# Patient Record
Sex: MALE | Race: WHITE | Employment: OTHER | URBAN - METROPOLITAN AREA
[De-identification: names, ages, dates, MRNs, and addresses within clinical notes are randomized per-mention and may not be internally consistent; named-entity substitution may affect disease eponyms.]

---

## 2017-01-04 RX ORDER — INSULIN GLARGINE 100 [IU]/ML
60 INJECTION, SOLUTION SUBCUTANEOUS NIGHTLY
Qty: 60 ML | Refills: 2 | Status: SHIPPED | OUTPATIENT
Start: 2017-01-04 | End: 2017-07-11 | Stop reason: SDUPTHER

## 2017-01-19 ENCOUNTER — OFFICE VISIT (OUTPATIENT)
Dept: ENDOCRINOLOGY | Age: 67
End: 2017-01-19

## 2017-01-19 VITALS
WEIGHT: 225.4 LBS | DIASTOLIC BLOOD PRESSURE: 61 MMHG | HEART RATE: 80 BPM | OXYGEN SATURATION: 94 % | BODY MASS INDEX: 36.86 KG/M2 | SYSTOLIC BLOOD PRESSURE: 125 MMHG | TEMPERATURE: 99.2 F

## 2017-01-19 DIAGNOSIS — E78.5 HYPERLIPIDEMIA, UNSPECIFIED HYPERLIPIDEMIA TYPE: ICD-10-CM

## 2017-01-19 DIAGNOSIS — E11.9 DIABETES MELLITUS WITHOUT COMPLICATION (HCC): Primary | ICD-10-CM

## 2017-01-19 DIAGNOSIS — I10 BENIGN ESSENTIAL HTN: ICD-10-CM

## 2017-01-19 LAB — HBA1C MFR BLD: 7.9 %

## 2017-01-19 PROCEDURE — 4040F PNEUMOC VAC/ADMIN/RCVD: CPT | Performed by: INTERNAL MEDICINE

## 2017-01-19 PROCEDURE — G8484 FLU IMMUNIZE NO ADMIN: HCPCS | Performed by: INTERNAL MEDICINE

## 2017-01-19 PROCEDURE — G8427 DOCREV CUR MEDS BY ELIG CLIN: HCPCS | Performed by: INTERNAL MEDICINE

## 2017-01-19 PROCEDURE — 1036F TOBACCO NON-USER: CPT | Performed by: INTERNAL MEDICINE

## 2017-01-19 PROCEDURE — 99214 OFFICE O/P EST MOD 30 MIN: CPT | Performed by: INTERNAL MEDICINE

## 2017-01-19 PROCEDURE — G8419 CALC BMI OUT NRM PARAM NOF/U: HCPCS | Performed by: INTERNAL MEDICINE

## 2017-01-19 PROCEDURE — 3045F PR MOST RECENT HEMOGLOBIN A1C LEVEL 7.0-9.0%: CPT | Performed by: INTERNAL MEDICINE

## 2017-01-19 PROCEDURE — 3017F COLORECTAL CA SCREEN DOC REV: CPT | Performed by: INTERNAL MEDICINE

## 2017-01-19 PROCEDURE — G8599 NO ASA/ANTIPLAT THER USE RNG: HCPCS | Performed by: INTERNAL MEDICINE

## 2017-01-19 PROCEDURE — 1123F ACP DISCUSS/DSCN MKR DOCD: CPT | Performed by: INTERNAL MEDICINE

## 2017-01-19 PROCEDURE — 83036 HEMOGLOBIN GLYCOSYLATED A1C: CPT | Performed by: INTERNAL MEDICINE

## 2017-03-30 RX ORDER — OMEPRAZOLE 20 MG/1
CAPSULE, DELAYED RELEASE ORAL
Qty: 180 CAPSULE | Refills: 3 | Status: SHIPPED | OUTPATIENT
Start: 2017-03-30 | End: 2018-03-27 | Stop reason: SDUPTHER

## 2017-04-25 ENCOUNTER — OFFICE VISIT (OUTPATIENT)
Dept: DERMATOLOGY | Age: 67
End: 2017-04-25

## 2017-04-25 DIAGNOSIS — Z12.83 SCREENING EXAM FOR SKIN CANCER: ICD-10-CM

## 2017-04-25 DIAGNOSIS — L57.0 ACTINIC KERATOSIS: Primary | ICD-10-CM

## 2017-04-25 DIAGNOSIS — Z86.006 HISTORY OF MELANOMA IN SITU: ICD-10-CM

## 2017-04-25 PROCEDURE — 4040F PNEUMOC VAC/ADMIN/RCVD: CPT | Performed by: DERMATOLOGY

## 2017-04-25 PROCEDURE — 99214 OFFICE O/P EST MOD 30 MIN: CPT | Performed by: DERMATOLOGY

## 2017-04-25 PROCEDURE — 3017F COLORECTAL CA SCREEN DOC REV: CPT | Performed by: DERMATOLOGY

## 2017-04-25 PROCEDURE — 1123F ACP DISCUSS/DSCN MKR DOCD: CPT | Performed by: DERMATOLOGY

## 2017-04-25 PROCEDURE — 17000 DESTRUCT PREMALG LESION: CPT | Performed by: DERMATOLOGY

## 2017-04-25 PROCEDURE — G8427 DOCREV CUR MEDS BY ELIG CLIN: HCPCS | Performed by: DERMATOLOGY

## 2017-04-25 PROCEDURE — G8599 NO ASA/ANTIPLAT THER USE RNG: HCPCS | Performed by: DERMATOLOGY

## 2017-04-25 PROCEDURE — 1036F TOBACCO NON-USER: CPT | Performed by: DERMATOLOGY

## 2017-04-25 PROCEDURE — G8417 CALC BMI ABV UP PARAM F/U: HCPCS | Performed by: DERMATOLOGY

## 2017-04-28 ENCOUNTER — OFFICE VISIT (OUTPATIENT)
Dept: ENDOCRINOLOGY | Age: 67
End: 2017-04-28

## 2017-04-28 VITALS
HEIGHT: 65 IN | BODY MASS INDEX: 38.62 KG/M2 | OXYGEN SATURATION: 94 % | SYSTOLIC BLOOD PRESSURE: 123 MMHG | RESPIRATION RATE: 14 BRPM | HEART RATE: 75 BPM | WEIGHT: 231.8 LBS | DIASTOLIC BLOOD PRESSURE: 70 MMHG

## 2017-04-28 DIAGNOSIS — E78.5 HYPERLIPIDEMIA, UNSPECIFIED HYPERLIPIDEMIA TYPE: ICD-10-CM

## 2017-04-28 DIAGNOSIS — I10 BENIGN ESSENTIAL HTN: ICD-10-CM

## 2017-04-28 DIAGNOSIS — E11.9 DIABETES MELLITUS WITHOUT COMPLICATION (HCC): Primary | ICD-10-CM

## 2017-04-28 LAB — HBA1C MFR BLD: 8 %

## 2017-04-28 PROCEDURE — 1036F TOBACCO NON-USER: CPT | Performed by: INTERNAL MEDICINE

## 2017-04-28 PROCEDURE — 83036 HEMOGLOBIN GLYCOSYLATED A1C: CPT | Performed by: INTERNAL MEDICINE

## 2017-04-28 PROCEDURE — 4040F PNEUMOC VAC/ADMIN/RCVD: CPT | Performed by: INTERNAL MEDICINE

## 2017-04-28 PROCEDURE — 1123F ACP DISCUSS/DSCN MKR DOCD: CPT | Performed by: INTERNAL MEDICINE

## 2017-04-28 PROCEDURE — G8599 NO ASA/ANTIPLAT THER USE RNG: HCPCS | Performed by: INTERNAL MEDICINE

## 2017-04-28 PROCEDURE — 3017F COLORECTAL CA SCREEN DOC REV: CPT | Performed by: INTERNAL MEDICINE

## 2017-04-28 PROCEDURE — 99214 OFFICE O/P EST MOD 30 MIN: CPT | Performed by: INTERNAL MEDICINE

## 2017-04-28 PROCEDURE — G8417 CALC BMI ABV UP PARAM F/U: HCPCS | Performed by: INTERNAL MEDICINE

## 2017-04-28 PROCEDURE — G8427 DOCREV CUR MEDS BY ELIG CLIN: HCPCS | Performed by: INTERNAL MEDICINE

## 2017-04-28 PROCEDURE — 3045F PR MOST RECENT HEMOGLOBIN A1C LEVEL 7.0-9.0%: CPT | Performed by: INTERNAL MEDICINE

## 2017-05-16 ENCOUNTER — EMPLOYEE WELLNESS (OUTPATIENT)
Dept: OTHER | Age: 67
End: 2017-05-16

## 2017-05-16 LAB
CHOLESTEROL, TOTAL: 123 MG/DL (ref 0–199)
ESTIMATED AVERAGE GLUCOSE: 188.6 MG/DL
GLUCOSE BLD-MCNC: 304 MG/DL (ref 70–99)
HBA1C MFR BLD: 8.2 %
HDLC SERPL-MCNC: 44 MG/DL (ref 40–60)
LDL CHOLESTEROL CALCULATED: 57 MG/DL
TRIGL SERPL-MCNC: 110 MG/DL (ref 0–150)

## 2017-06-29 ENCOUNTER — HOSPITAL ENCOUNTER (OUTPATIENT)
Dept: DIABETES SERVICES | Age: 67
Discharge: HOME | End: 2017-06-30
Attending: INTERNAL MEDICINE | Admitting: INTERNAL MEDICINE

## 2017-06-29 DIAGNOSIS — E11.9 TYPE 2 DIABETES MELLITUS WITHOUT COMPLICATIONS (HCC): ICD-10-CM

## 2017-06-29 ASSESSMENT — PATIENT HEALTH QUESTIONNAIRE - PHQ9
4. FEELING TIRED OR HAVING LITTLE ENERGY: 1
3. TROUBLE FALLING OR STAYING ASLEEP: 1
6. FEELING BAD ABOUT YOURSELF - OR THAT YOU ARE A FAILURE OR HAVE LET YOURSELF OR YOUR FAMILY DOWN: 3
SUM OF ALL RESPONSES TO PHQ9 QUESTIONS 1 & 2: 3
8. MOVING OR SPEAKING SO SLOWLY THAT OTHER PEOPLE COULD HAVE NOTICED. OR THE OPPOSITE, BEING SO FIGETY OR RESTLESS THAT YOU HAVE BEEN MOVING AROUND A LOT MORE THAN USUAL: 0
7. TROUBLE CONCENTRATING ON THINGS, SUCH AS READING THE NEWSPAPER OR WATCHING TELEVISION: 1
SUM OF ALL RESPONSES TO PHQ QUESTIONS 1-9: 10
2. FEELING DOWN, DEPRESSED OR HOPELESS: 1
9. THOUGHTS THAT YOU WOULD BE BETTER OFF DEAD, OR OF HURTING YOURSELF: 0
5. POOR APPETITE OR OVEREATING: 1
1. LITTLE INTEREST OR PLEASURE IN DOING THINGS: 2

## 2017-07-06 ENCOUNTER — OFFICE VISIT (OUTPATIENT)
Dept: FAMILY MEDICINE CLINIC | Age: 67
End: 2017-07-06

## 2017-07-06 ENCOUNTER — TELEPHONE (OUTPATIENT)
Dept: FAMILY MEDICINE CLINIC | Age: 67
End: 2017-07-06

## 2017-07-06 VITALS
HEART RATE: 87 BPM | BODY MASS INDEX: 37.87 KG/M2 | WEIGHT: 227.6 LBS | SYSTOLIC BLOOD PRESSURE: 124 MMHG | DIASTOLIC BLOOD PRESSURE: 74 MMHG | OXYGEN SATURATION: 94 % | RESPIRATION RATE: 16 BRPM

## 2017-07-06 DIAGNOSIS — E11.9 DIABETES MELLITUS WITHOUT COMPLICATION (HCC): ICD-10-CM

## 2017-07-06 DIAGNOSIS — L03.311 CELLULITIS OF ABDOMINAL WALL: Primary | ICD-10-CM

## 2017-07-06 DIAGNOSIS — E65 PANNUS, ABDOMINAL: ICD-10-CM

## 2017-07-06 DIAGNOSIS — F32.1 MODERATE SINGLE CURRENT EPISODE OF MAJOR DEPRESSIVE DISORDER (HCC): ICD-10-CM

## 2017-07-06 DIAGNOSIS — Z87.891 HISTORY OF SMOKING: ICD-10-CM

## 2017-07-06 PROCEDURE — 4040F PNEUMOC VAC/ADMIN/RCVD: CPT | Performed by: FAMILY MEDICINE

## 2017-07-06 PROCEDURE — G8427 DOCREV CUR MEDS BY ELIG CLIN: HCPCS | Performed by: FAMILY MEDICINE

## 2017-07-06 PROCEDURE — 99214 OFFICE O/P EST MOD 30 MIN: CPT | Performed by: FAMILY MEDICINE

## 2017-07-06 PROCEDURE — 90732 PPSV23 VACC 2 YRS+ SUBQ/IM: CPT | Performed by: FAMILY MEDICINE

## 2017-07-06 PROCEDURE — G0009 ADMIN PNEUMOCOCCAL VACCINE: HCPCS | Performed by: FAMILY MEDICINE

## 2017-07-06 PROCEDURE — 3046F HEMOGLOBIN A1C LEVEL >9.0%: CPT | Performed by: FAMILY MEDICINE

## 2017-07-06 PROCEDURE — 3017F COLORECTAL CA SCREEN DOC REV: CPT | Performed by: FAMILY MEDICINE

## 2017-07-06 PROCEDURE — G8599 NO ASA/ANTIPLAT THER USE RNG: HCPCS | Performed by: FAMILY MEDICINE

## 2017-07-06 PROCEDURE — G8417 CALC BMI ABV UP PARAM F/U: HCPCS | Performed by: FAMILY MEDICINE

## 2017-07-06 PROCEDURE — 1036F TOBACCO NON-USER: CPT | Performed by: FAMILY MEDICINE

## 2017-07-06 PROCEDURE — 1123F ACP DISCUSS/DSCN MKR DOCD: CPT | Performed by: FAMILY MEDICINE

## 2017-07-06 RX ORDER — SULFAMETHOXAZOLE AND TRIMETHOPRIM 800; 160 MG/1; MG/1
1 TABLET ORAL 2 TIMES DAILY
Qty: 20 TABLET | Refills: 0 | Status: SHIPPED | OUTPATIENT
Start: 2017-07-06 | End: 2017-07-11 | Stop reason: ALTCHOICE

## 2017-07-06 RX ORDER — DAPAGLIFLOZIN AND METFORMIN HYDROCHLORIDE 5; 500 MG/1; MG/1
TABLET, FILM COATED, EXTENDED RELEASE ORAL
Qty: 90 TABLET | Refills: 3 | Status: SHIPPED | OUTPATIENT
Start: 2017-07-06 | End: 2017-12-04

## 2017-07-06 RX ORDER — INSULIN LISPRO 100 [IU]/ML
INJECTION, SOLUTION INTRAVENOUS; SUBCUTANEOUS
Qty: 30 ML | Refills: 3 | Status: SHIPPED | OUTPATIENT
Start: 2017-07-06 | End: 2018-09-25 | Stop reason: SDUPTHER

## 2017-07-11 ENCOUNTER — CARE COORDINATION (OUTPATIENT)
Dept: CARE COORDINATION | Age: 67
End: 2017-07-11

## 2017-07-11 RX ORDER — INSULIN GLARGINE 100 [IU]/ML
50 INJECTION, SOLUTION SUBCUTANEOUS
COMMUNITY
End: 2018-09-25 | Stop reason: SDUPTHER

## 2017-07-11 RX ORDER — BLOOD-GLUCOSE METER
EACH MISCELLANEOUS
COMMUNITY
End: 2017-11-20

## 2017-07-24 ENCOUNTER — CARE COORDINATION (OUTPATIENT)
Dept: CARE COORDINATION | Age: 67
End: 2017-07-24

## 2017-08-23 ENCOUNTER — CARE COORDINATION (OUTPATIENT)
Dept: CARE COORDINATION | Age: 67
End: 2017-08-23

## 2017-08-28 DIAGNOSIS — E78.5 HYPERLIPIDEMIA, UNSPECIFIED HYPERLIPIDEMIA TYPE: ICD-10-CM

## 2017-08-28 DIAGNOSIS — E11.9 DIABETES MELLITUS WITHOUT COMPLICATION (HCC): ICD-10-CM

## 2017-08-28 LAB
A/G RATIO: 1.9 (ref 1.1–2.2)
ALBUMIN SERPL-MCNC: 3.9 G/DL (ref 3.4–5)
ALP BLD-CCNC: 66 U/L (ref 40–129)
ALT SERPL-CCNC: 23 U/L (ref 10–40)
ANION GAP SERPL CALCULATED.3IONS-SCNC: 10 MMOL/L (ref 3–16)
AST SERPL-CCNC: 20 U/L (ref 15–37)
BILIRUB SERPL-MCNC: 0.5 MG/DL (ref 0–1)
BUN BLDV-MCNC: 19 MG/DL (ref 7–20)
CALCIUM SERPL-MCNC: 8.9 MG/DL (ref 8.3–10.6)
CHLORIDE BLD-SCNC: 102 MMOL/L (ref 99–110)
CHOLESTEROL, TOTAL: 125 MG/DL (ref 0–199)
CO2: 29 MMOL/L (ref 21–32)
CREAT SERPL-MCNC: 0.9 MG/DL (ref 0.8–1.3)
CREATININE URINE: 137.5 MG/DL (ref 39–259)
ESTIMATED AVERAGE GLUCOSE: 200.1 MG/DL
GFR AFRICAN AMERICAN: >60
GFR NON-AFRICAN AMERICAN: >60
GLOBULIN: 2.1 G/DL
GLUCOSE BLD-MCNC: 134 MG/DL (ref 70–99)
HBA1C MFR BLD: 8.6 %
HDLC SERPL-MCNC: 43 MG/DL (ref 40–60)
LDL CHOLESTEROL CALCULATED: 63 MG/DL
MICROALBUMIN UR-MCNC: 8.4 MG/DL
MICROALBUMIN/CREAT UR-RTO: 61.1 MG/G (ref 0–30)
POTASSIUM SERPL-SCNC: 4.4 MMOL/L (ref 3.5–5.1)
SODIUM BLD-SCNC: 141 MMOL/L (ref 136–145)
TOTAL PROTEIN: 6 G/DL (ref 6.4–8.2)
TRIGL SERPL-MCNC: 94 MG/DL (ref 0–150)
TSH SERPL DL<=0.05 MIU/L-ACNC: 2.51 UIU/ML (ref 0.27–4.2)
VLDLC SERPL CALC-MCNC: 19 MG/DL

## 2017-08-31 ENCOUNTER — OFFICE VISIT (OUTPATIENT)
Dept: ENDOCRINOLOGY | Age: 67
End: 2017-08-31

## 2017-08-31 VITALS
BODY MASS INDEX: 38.35 KG/M2 | WEIGHT: 230.2 LBS | RESPIRATION RATE: 14 BRPM | OXYGEN SATURATION: 94 % | SYSTOLIC BLOOD PRESSURE: 131 MMHG | DIASTOLIC BLOOD PRESSURE: 71 MMHG | HEIGHT: 65 IN | HEART RATE: 75 BPM

## 2017-08-31 DIAGNOSIS — I10 BENIGN ESSENTIAL HTN: ICD-10-CM

## 2017-08-31 DIAGNOSIS — E78.5 HYPERLIPIDEMIA, UNSPECIFIED HYPERLIPIDEMIA TYPE: ICD-10-CM

## 2017-08-31 DIAGNOSIS — E11.9 DIABETES MELLITUS WITHOUT COMPLICATION (HCC): Primary | ICD-10-CM

## 2017-08-31 PROCEDURE — 3017F COLORECTAL CA SCREEN DOC REV: CPT | Performed by: INTERNAL MEDICINE

## 2017-08-31 PROCEDURE — 1036F TOBACCO NON-USER: CPT | Performed by: INTERNAL MEDICINE

## 2017-08-31 PROCEDURE — 1123F ACP DISCUSS/DSCN MKR DOCD: CPT | Performed by: INTERNAL MEDICINE

## 2017-08-31 PROCEDURE — 3046F HEMOGLOBIN A1C LEVEL >9.0%: CPT | Performed by: INTERNAL MEDICINE

## 2017-08-31 PROCEDURE — G8417 CALC BMI ABV UP PARAM F/U: HCPCS | Performed by: INTERNAL MEDICINE

## 2017-08-31 PROCEDURE — G8427 DOCREV CUR MEDS BY ELIG CLIN: HCPCS | Performed by: INTERNAL MEDICINE

## 2017-08-31 PROCEDURE — G8599 NO ASA/ANTIPLAT THER USE RNG: HCPCS | Performed by: INTERNAL MEDICINE

## 2017-08-31 PROCEDURE — 99214 OFFICE O/P EST MOD 30 MIN: CPT | Performed by: INTERNAL MEDICINE

## 2017-08-31 PROCEDURE — 4040F PNEUMOC VAC/ADMIN/RCVD: CPT | Performed by: INTERNAL MEDICINE

## 2017-08-31 RX ORDER — LANCETS
EACH MISCELLANEOUS
Qty: 400 EACH | Refills: 3 | Status: SHIPPED | OUTPATIENT
Start: 2017-08-31 | End: 2017-11-20

## 2017-09-23 ENCOUNTER — CARE COORDINATION (OUTPATIENT)
Dept: CARE COORDINATION | Age: 67
End: 2017-09-23

## 2017-10-05 RX ORDER — ATORVASTATIN CALCIUM 20 MG/1
TABLET, FILM COATED ORAL
Qty: 90 TABLET | Refills: 3 | Status: SHIPPED | OUTPATIENT
Start: 2017-10-05 | End: 2018-09-26 | Stop reason: SDUPTHER

## 2017-10-05 RX ORDER — VALSARTAN 160 MG/1
TABLET ORAL
Qty: 90 TABLET | Refills: 3 | Status: SHIPPED | OUTPATIENT
Start: 2017-10-05 | End: 2018-07-23

## 2017-10-05 RX ORDER — AMLODIPINE BESYLATE 5 MG/1
TABLET ORAL
Qty: 90 TABLET | Refills: 3 | Status: SHIPPED | OUTPATIENT
Start: 2017-10-05 | End: 2018-09-25 | Stop reason: SDUPTHER

## 2017-10-06 ENCOUNTER — CARE COORDINATION (OUTPATIENT)
Dept: CARE COORDINATION | Age: 67
End: 2017-10-06

## 2017-10-06 NOTE — CARE COORDINATION
Outreach call attempted. Unable to reach pt.  Will attempt to reach pt Monday between 12-2 per pt's request.     Bill LEONN, RN Care Coordinator  [address] [place] [address] &  [facility]   [phone]

## 2017-10-11 ENCOUNTER — CARE COORDINATION (OUTPATIENT)
Dept: CARE COORDINATION | Age: 67
End: 2017-10-11

## 2017-10-11 NOTE — CARE COORDINATION
Ambulatory Care Coordination Note  10/11/2017  CM Risk Score: 3  Pau Mortality Risk Score: 2.65    ACC: Vladimir Davis, RN    Summary Note: Outreach call to pt to check on status. Pt stated he has started overcoming his fears and has recently started taking his insulin (prandial prior to eating meals)! Pt expressed he is aware his A1C is higher and he has a F/U appt with Endo: Dr. Tawny Arce (see below). Pt talked to about peak and start of action for his insulin. Pt stated he will titrate his Lantus every morning to 60 units (starting 10/12/2017). Pt stated his fasting's average is 117-135 (breakfast), 150-180 (lunch) and 180+ (dinner). Pt aware writer will send message to Dr. Tawny Arce to update him. Pt stated he is working out 20 minutes every other day! (using his treadmill). Pt talked to about recent DM appts. Pt stated he learned a lot and was appreciative of the information. Will follow up with pt in 2 weeks. Care Coordination Interventions    Program Enrollment:  Rising Risk  Referral from Primary Care Provider:  No  Suggested Interventions and Community Resources  Diabetes Education: In Process  Registered Dietician: In Process  Other Services or Interventions: Active with DM Educator. Pt is interested in working with [address] [facility] regarding glucoses and possible 1:1's with ACC RD, CDE. DM Educational Materials mailed to pt 7/11/2017         Goals Addressed             Most Recent     Patient Stated (pt-stated)   Improving (10/11/2017)             I want to work on preventing hyperglycemia and my fear of hypoglycemia     Barriers: overwhelmed by complexity of regimen  Plan for overcoming my barriers: Weekly calls to review glucoses and trends   Confidence: 7/10  Anticipated Goal Completion Date: 9/10/2017            Prior to Admission medications    Medication Sig Start Date End Date Taking?  Authorizing Provider   atorvastatin (LIPITOR) 20 MG tablet TAKE ONE TABLET BY MOUTH ONCE DAILY 10/5/17  Yes Oswaldo Santos CNP   valsartan (DIOVAN) 160 MG tablet TAKE ONE TABLET BY MOUTH DAILY 10/5/17  Yes Jessica Valenzuela CNP   amLODIPine (NORVASC) 5 MG tablet TAKE ONE TABLET BY MOUTH ONCE DAILY 10/5/17  Yes Jessica Valenzuela CNP   Accu-Chek Multiclix Lancets MISC USE TO TEST BLOOD SUGAR 4 TIMES DAILY AS DIRECTED E 11.9  Patient taking differently: USE TO TEST BLOOD SUGAR 4 TIMES DAILY AS DIRECTED E 11.9  Up to 10x daily 8/31/17  Yes Jesus Becker MD   insulin glargine (LANTUS) 100 UNIT/ML injection vial Inject 60 Units into the skin every morning (before breakfast) Will start 60 units (10/12/2017)   Yes Historical Provider, MD   Blood Glucose Monitoring Suppl (AGAMATRIX PRESTO) w/Device KIT by Does not apply route   Yes Historical Provider, MD   HUMALOG KWIKPEN 100 UNIT/ML pen INJECT 10 UNITS SUBCUTANEOUSLY THREE TIMES A DAY ( BEFORE MEALS )  Patient taking differently: INJECT 10-12 UNITS SUBCUTANEOUSLY THREE TIMES A DAY ( BEFORE MEALS ) 7/6/17  Yes Piero Hernandez MD   XIGDUO XR 5-500 MG TB24 TAKE ONE TABLET BY MOUTH ONE TIME A DAY 7/6/17  Yes Piero Hernandez MD   Insulin Pen Needle 31G X 5 MM MISC 1 each by Does not apply route daily 6/16/17  Yes Jesus Becker MD   omeprazole (PRILOSEC) 20 MG delayed release capsule TAKE ONE CAPSULE BY MOUTH TWICE A DAY 3/30/17  Yes Jesus Becker MD   glucose blood VI test strips (AGAMATRIX PRESTO TEST) strip Test BS 4-5 times a day. Hyperglycemia. Hypoglycemia. 3/28/17  Yes Jesus Becker MD   Bismuth Subsalicylate (PEPTO-BISMOL) 262 MG TABS Take by mouth   Yes Historical Provider, MD   Loperamide HCl (IMODIUM A-D PO) Take by mouth   Yes Historical Provider, MD   [address] X 5/16\" 1 ML MISC USE ONCE DAILY FOR LANTUS INJECTION 10/5/16  Yes Jesus Becker MD   Blood Glucose Monitoring Suppl (ONETOUCH VERIO) W/DEVICE KIT 1 each by Does not apply route daily.  4/14/15  Yes Jesus Becker MD   Cholecalciferol (VITAMIN D3) 1000 UNITS CAPS Take 1,000 each by mouth daily. Yes Historical Provider, MD   aspirin EC 81 MG EC tablet Take 1 tablet by mouth daily.  4/6/12  Yes Brett Paniagua,    loratadine (CLARITIN) 10 MG tablet Take 10 mg by mouth daily Indications: PRN only per PT    Yes Historical Provider, MD       Future Appointments  Date Time Provider Nikunj Jocelyne   10/24/2017 11:45 AM MD Mariann Torres [facility]   12/4/2017 11:00 AM MD Payton Tompkins Endo [facility]       Luis Alberto Hinds BSN, RN Care Coordinator  [facility],  [address] [place] [address] Primary Care  [phone]

## 2017-10-24 ENCOUNTER — OFFICE VISIT (OUTPATIENT)
Dept: DERMATOLOGY | Age: 67
End: 2017-10-24

## 2017-10-24 ENCOUNTER — NURSE ONLY (OUTPATIENT)
Dept: FAMILY MEDICINE CLINIC | Age: 67
End: 2017-10-24

## 2017-10-24 DIAGNOSIS — L57.0 ACTINIC KERATOSES: Primary | ICD-10-CM

## 2017-10-24 DIAGNOSIS — Z86.006 HISTORY OF MELANOMA IN SITU: ICD-10-CM

## 2017-10-24 DIAGNOSIS — Z12.83 SCREENING EXAM FOR SKIN CANCER: ICD-10-CM

## 2017-10-24 DIAGNOSIS — Z23 NEEDS FLU SHOT: Primary | ICD-10-CM

## 2017-10-24 PROCEDURE — G8417 CALC BMI ABV UP PARAM F/U: HCPCS | Performed by: DERMATOLOGY

## 2017-10-24 PROCEDURE — 17003 DESTRUCT PREMALG LES 2-14: CPT | Performed by: DERMATOLOGY

## 2017-10-24 PROCEDURE — 4040F PNEUMOC VAC/ADMIN/RCVD: CPT | Performed by: DERMATOLOGY

## 2017-10-24 PROCEDURE — 1036F TOBACCO NON-USER: CPT | Performed by: DERMATOLOGY

## 2017-10-24 PROCEDURE — 90662 IIV NO PRSV INCREASED AG IM: CPT | Performed by: FAMILY MEDICINE

## 2017-10-24 PROCEDURE — G8427 DOCREV CUR MEDS BY ELIG CLIN: HCPCS | Performed by: DERMATOLOGY

## 2017-10-24 PROCEDURE — 1123F ACP DISCUSS/DSCN MKR DOCD: CPT | Performed by: DERMATOLOGY

## 2017-10-24 PROCEDURE — 3017F COLORECTAL CA SCREEN DOC REV: CPT | Performed by: DERMATOLOGY

## 2017-10-24 PROCEDURE — 90471 IMMUNIZATION ADMIN: CPT | Performed by: FAMILY MEDICINE

## 2017-10-24 PROCEDURE — 17000 DESTRUCT PREMALG LESION: CPT | Performed by: DERMATOLOGY

## 2017-10-24 PROCEDURE — 99213 OFFICE O/P EST LOW 20 MIN: CPT | Performed by: DERMATOLOGY

## 2017-10-24 PROCEDURE — G8599 NO ASA/ANTIPLAT THER USE RNG: HCPCS | Performed by: DERMATOLOGY

## 2017-10-24 PROCEDURE — G8484 FLU IMMUNIZE NO ADMIN: HCPCS | Performed by: DERMATOLOGY

## 2017-10-24 NOTE — PROGRESS NOTES
tablet 3    valsartan (DIOVAN) 160 MG tablet TAKE ONE TABLET BY MOUTH DAILY 90 tablet 3    amLODIPine (NORVASC) 5 MG tablet TAKE ONE TABLET BY MOUTH ONCE DAILY 90 tablet 3    Accu-Chek Multiclix Lancets MISC USE TO TEST BLOOD SUGAR 4 TIMES DAILY AS DIRECTED E 11.9 (Patient taking differently: USE TO TEST BLOOD SUGAR 4 TIMES DAILY AS DIRECTED E 11.9  Up to 10x daily) 400 each 3    insulin glargine (LANTUS) 100 UNIT/ML injection vial Inject 60 Units into the skin every morning (before breakfast) Will start 60 units (10/12/2017)      Blood Glucose Monitoring Suppl (AGAMATRIX PRESTO) w/Device KIT by Does not apply route      HUMALOG KWIKPEN 100 UNIT/ML pen INJECT 10 UNITS SUBCUTANEOUSLY THREE TIMES A DAY ( BEFORE MEALS ) (Patient taking differently: INJECT 10-12 UNITS SUBCUTANEOUSLY THREE TIMES A DAY ( BEFORE MEALS )) 30 mL 3    XIGDUO XR 5-500 MG TB24 TAKE ONE TABLET BY MOUTH ONE TIME A DAY 90 tablet 3    Insulin Pen Needle 31G X 5 MM MISC 1 each by Does not apply route daily 100 each 3    omeprazole (PRILOSEC) 20 MG delayed release capsule TAKE ONE CAPSULE BY MOUTH TWICE A  capsule 3    glucose blood VI test strips (AGAMATRIX PRESTO TEST) strip Test BS 4-5 times a day. Hyperglycemia. Hypoglycemia. 450 each 3    Bismuth Subsalicylate (PEPTO-BISMOL) 262 MG TABS Take by mouth      Loperamide HCl (IMODIUM A-D PO) Take by mouth      EASY TOUCH INSULIN SYRINGE 31G X 5/16\" 1 ML MISC USE ONCE DAILY FOR LANTUS INJECTION 100 each 3    Blood Glucose Monitoring Suppl (ONETOUCH VERIO) W/DEVICE KIT 1 each by Does not apply route daily. 1 kit 0    Cholecalciferol (VITAMIN D3) 1000 UNITS CAPS Take 1,000 each by mouth daily.  aspirin EC 81 MG EC tablet Take 1 tablet by mouth daily.  30 tablet 11    loratadine (CLARITIN) 10 MG tablet Take 10 mg by mouth daily Indications: PRN only per PT        Social History: Retired      Physical Examination     The following were examined and determined to be normal: Psych/Neuro, Scalp/hair,  Conjunctivae/eyelids, Gums/teeth/lips, Neck, Nails/digits and Genitalia/groin/buttocks. The following were examined and determined to be abnormal: Head/face,Breast/axilla/chest, Abdomen, Back, RUE, LUE, RLE and LLE. -General: Well-appearing, NAD  1. R upper abdomen - linear surgical scar. No papules, nodularity or dyspigmentation appreciated. -Scattered on the trunk and extremities are multiple well-defined round and oval symmetric smoothly-bordered uniformly brown macules and papules.  -Negative cervical, axillary, inguinal lymphadenopathy. Negative hepatosplenomegaly. 2. Upper midline forehead 1, nasal bridge 1 - irregularly-shaped roughly-scaling thin pink papule     Assessment and Plan     1. History of melanoma in situ - clear today  -Recommend monthly self skin exams   -Educated regarding the ABCDEs of melanoma detection   -Call for any new/changing moles or concerning lesions  -Reviewed sun protective behavior -- sun avoidance during the peak hours of the day, sun-protective clothing (including hat and sunglasses), sunscreen use (water resistant, broad spectrum, SPF at least 30, need for reapplication every 2 to 3 hours), avoidance of tanning beds   -Return for full skin exam in 6 months     2. Actinic keratosis(es)  -Edu re: relationship with chronic cumulative sun exposure, low premalignant potential.   -2 lesion(s) treated w/ liquid nitrogen x 2 cycles - Upper midline forehead 1, nasal bridge 1. Edu re: risk of blister formation, discomfort, scar, hypopigmentation. Discussed wound care.

## 2017-10-25 ENCOUNTER — CARE COORDINATION (OUTPATIENT)
Dept: CARE COORDINATION | Age: 67
End: 2017-10-25

## 2017-10-25 NOTE — CARE COORDINATION
Ambulatory Care Coordination Note  10/25/2017  CM Risk Score: 3  Pau Mortality Risk Score: 2.65    ACC: Yoan Lion, RN    Summary Note: Outreach call attempted. Unable to reach pt. Message left requesting return call- if no return call will attempt to reach pt in next 7-10 days. Pt has been working with writer for past few months regarding his glucose and fears of taking medications as ordered due to fear of hypoglycemia. Pt stated he is increasing his physical activity at time of last call and taking his insulin before meals. Ambulatory Care Coordination Assessment    Care Coordination Protocol  Program Enrollment:  Rising Risk  Referral from Primary Care Provider:  No  Week 1 - Initial Assessment     Do you have all of your prescriptions and are they filled?:  Yes  Barriers to medication adherence:  None  Are you able to afford your medications?:  Yes  How often do you have trouble taking your medications the way you have been told to take them?:  I always take them as prescribed. Do you have Home O2 Therapy?:  No      Ability to seek help/take action for Emergent Urgent situations i.e. fire, crime, inclement weather or health crisis. :  Independent  Ability to ambulate to restroom:  Independent  Ability handle personal hygeine needs (bathing/dressing/grooming): Independent  Ability to manage Medications: Independent  Ability to prepare Food Preparation:  Independent  Ability to maintain home (clean home, laundry): Independent  Ability to drive and/or has transportation:  Independent  Ability to do shopping:  Independent  Ability to manage finances:   Independent  Is patient able to live independently?:  Yes     Current Housing:  Private Residence        Per the Fall Risk Screening, did the patient have 2 or more falls or 1 fall with injury in the past year?:  No     Frequent urination at night?:  No  Do you use rails/bars?:  No     Are you experiencing loss of meaning?:  No  Are you experiencing 3/28/17   Estevan Hartman MD   Bismuth Subsalicylate (PEPTO-BISMOL) 262 MG TABS Take by mouth    Historical Provider, MD   Loperamide HCl (IMODIUM A-D PO) Take by mouth    Historical Provider[place]   [address] X 5/16\" 1 ML MISC USE ONCE DAILY FOR LANTUS INJECTION 10/5/16   Estevan Hartman MD   Blood Glucose Monitoring Suppl (ONETOUCH VERIO) W/DEVICE KIT 1 each by Does not apply route daily. 4/14/15   Estevan Hartman MD   Cholecalciferol (VITAMIN D3) 1000 UNITS CAPS Take 1,000 each by mouth daily. Historical Provider, MD   aspirin EC 81 MG EC tablet Take 1 tablet by mouth daily.  4/6/12   Brett Paniagua DO   loratadine (CLARITIN) 10 MG tablet Take 10 mg by mouth daily Indications: PRN only per PT     Historical Provider, MD     - Unable to review medications    Future Appointments  Date Time Provider Nikunj Jocelyne   12/4/2017 11:00 AM Estevan Hartman MD [facility]   4/24/2018 10:45 AM Slim Kumari MD [facility]       Fátima Varghese BSN, RN Care Coordinator  [address] [place] [address] &  [facility]  [phone]

## 2017-11-02 ENCOUNTER — CARE COORDINATION (OUTPATIENT)
Dept: CARE COORDINATION | Age: 67
End: 2017-11-02

## 2017-11-02 NOTE — CARE COORDINATION
Outreach f/u call attempted. Unable to reach pt. Message left with reason for call requesting call back. If no return call received- will follow up in 2-3 weeks.     Glo Medina BSN, RN Care Coordinator  [address] [place] [address] Primary Care  [phone]

## 2017-11-20 ENCOUNTER — SCHEDULED TELEPHONE ENCOUNTER (OUTPATIENT)
Dept: PHARMACY | Facility: CLINIC | Age: 67
End: 2017-11-20

## 2017-11-20 ENCOUNTER — CARE COORDINATION (OUTPATIENT)
Dept: CARE COORDINATION | Age: 67
End: 2017-11-20

## 2017-11-20 ENCOUNTER — CLINICAL DOCUMENTATION (OUTPATIENT)
Dept: PHARMACY | Facility: CLINIC | Age: 67
End: 2017-11-20

## 2017-11-20 ENCOUNTER — ENROLLMENT (OUTPATIENT)
Dept: PHARMACY | Facility: CLINIC | Age: 67
End: 2017-11-20

## 2017-11-20 NOTE — TELEPHONE ENCOUNTER
[facility]) Employee Diabetes Program  =================================================================  Booker Forde is a 75 y.o. male enrolled in the [address] [place] [address] Diabetes Program.    Medications:  As per current list, note:  · BGs: 130-140 1st AM; up to 190 at PM before dinner  · Sts tests at least 4x/day - up to 7 or 8 times when adjusting meds or sick, etc  · Confirms insulin dosing as rx'd; confirms now using Humalog BEFORE meals  · Prefers Lantus vials and Humalog pen  · States he's concerned about Faythe Chill - received letter that it's excluded from formulary in 2018  · Has read concerning things about Invokana (box warning re amputation)  · Also reports he's heard about \"heart things\" with Jardiance - discussed that it's a positive/benefit with the medication, not a warning - reduces CVD risk    Allergies:  No Known Allergies   *per 8/31/17 endo note: unable to tolerate Trulicity (nausea)    Vitals/Labs:  BP Readings from Last 3 Encounters:   08/31/17 131/71   07/06/17 124/74   04/28/17 123/70     Lab Results   Component Value Date    LABMICR 8.40 (H) 08/28/2017     Lab Results   Component Value Date    LABA1C 8.6 08/28/2017    LABA1C 8.2 05/16/2017    LABA1C 8.0 04/28/2017     Lab Results   Component Value Date    CHOL 125 08/28/2017     Lab Results   Component Value Date    TRIG 94 08/28/2017     Lab Results   Component Value Date    HDL 43 08/28/2017     Lab Results   Component Value Date    LDLCALC 63 08/28/2017     ALT   Date Value Ref Range Status   08/28/2017 23 10 - 40 U/L Final     The ASCVD Risk score (Padma Hatch et al., 2013) failed to calculate for the following reasons:     The valid total cholesterol range is 130 to 320 mg/dL     Immunizations:  Immunization History   Administered Date(s) Administered    Influenza Virus Vaccine 10/11/2010, 09/16/2011, 10/03/2012, 10/25/2013    Influenza, High Dose 12/03/2015, 10/24/2016, 10/24/2017    Pneumococcal 13-valent Conjugate (Maddox Pimple) 12/03/2015    Pneumococcal Conjugate 7-valent 01/01/2002    Pneumococcal Polysaccharide ([id number]) 12/09/2010, 07/06/2017    Tdap (Boostrix, Adacel) 10/10/2012    Zoster 10/25/2013      Smoking Status:  History   Smoking Status    Former Smoker    Packs/day: 2.00    Years: 15.00    Types: Cigarettes   Smokeless Tobacco    Never Used     Comment: quit 1999      ASSESSMENT:  Initial Program Requirements (to be completed by 7/1/2018):  [] OV with provider for DM (1st)  [] A1c (1st)  [x] On statin  [x] On ACEi/ARB    Ongoing Program Requirements (to be completed by 12/15/2018):  [] OV with provider for DM (2nd)  [] ACC/diabetes educator visit (if A1c over 8%) - currently f/b ACC Dwayne Tate RN), although did not answer last 2 calls  [] A1c (2nd)  [] Lipid panel  [] Urine protein  [x] Pneumococcal vaccination: up to date  [] Influenza vaccination for 7026-9436  [] Medication adherence over 70%    Formulary Medication Review:  Non-formulary or medications with cost-effective alternatives: On Xigduo XR 5-500mg daily, which is excluded from formulary in 2018, but has not tried Synjardy XR, which may be considered. · Eligible for copay waiver, up to $600 in 2018:  · Atorvastatin, ASA, Agamatrix meter/supplies, [facility]-stocked/generic insulin syringes & pen needles, Lantus vial (coapy card available), Humalog KwikPen (copay card available), valsartan, Synjardy XR (copay card available; or Invokamet XR)  · Pt declines pursuing rx for ASA to be copay waived, not concerned of cost   · Prefers using Accu-Chek multiclix lancets - aware this will not be eligible for $600 waived copay benefit    Other Considerations:  - Glycemic Goal: <7.0% and directed by provider. Is not at blood glucose goal. Reporting improved SMBG readings with insulin titration. Dec endo appt.   - Blood Pressure Goal: BP less than 140/90 mmHg due to history of DM: Is at blood pressure goal and most ofthen <130/80   - Lipids: on moderate intensity statin, with LDL <70.  - Smoking status: quit    PLAN:  - Consideration(s) for provider:   · Formulary conversion of Xigduo XR (dapagliflozin-metformin ER 5-500mg) daily to Synjardy XR (empagliflozin-metformin ER 5-1000mg) daily   · Note: this would be an increase in metformin dosage, with same/slightly less SGLT2i dose  - DM program gaps for 2018 identified:   · Initial requirements in 1st 1/2 of 2018: n/a - discussed will need: OV with provider for DM (1st), A1c (1st)   · Ongoing requirements for 2nd 1/2 of 2018: n/a - discussed will need: OV with provider for DM (2nd), ACC/diabetes educator visit (if A1c over 8%), A1c (2nd), Lipid panel, urine protein, Influenza vaccination for 5098-3615, Medication adherence over 70%  - Education to patient: Counseling at today's visit: reinforced counseling re his concern for hypoglycemia. Diabetes educator referral - encouraged to call [facility] back  Reminded to bring in blood sugar diary at next visit.   - Follow up: As below & with ACC (FYI routing comments to endocrinologist & [facility])  - Upcoming appointments:   Future Appointments  Date Time Provider Nikunj Casanova   12/4/2017 11:00 AM MD Jessica Gallardo   4/24/2018 10:45 AM MD Alem Polanco, PharmD, [address] [facility]  Direct: [phone]  Department, toll free: [phone], option 7     =======================================================   For Pharmacy Admin Tracking Only    PHSO: Yes  Total # of Interventions Recommended: 1  Recommended intervention potential cost savings: 5868  Accepted intervention potential cost savings: 0  Total Interventions Accepted: 0  Time Spent (min): 35

## 2017-11-20 NOTE — LETTER
? Second A1c  ? Flu vaccination   ? Requirements if A1c is greater than 8 percent:  ? Engage with a [facility] diabetes educator at one of our hospital locations or an ambulatory care coordinator by phone, if your A1c is greater than 8 percent. We will be reviewing your [employer] account and sending you reminders for needed actions. Please remember if you dont have a [address], you will need to submit documentation to [email or web address] or by fax to [phone]. As a reminder, if programs requirements are not met, your benefit may be terminated and you will not be eligible to participate in the program next year. Thank you for participating in the program and taking steps to improve your health.

## 2017-11-20 NOTE — PROGRESS NOTES
Pharmacy Pop Care Documentation:      The application for [place] [place] for enrollment into the diabetes management program has been reviewed and accepted on 11/20/17.     Jenae High

## 2017-11-21 NOTE — CARE COORDINATION
Ambulatory Care Coordination Note  11/21/2017  CM Risk Score: 3  Pau Mortality Risk Score: 2.65    ACC: Patricia Du, RN    Summary Note: Follow up call with pt. Pt expressed he spoke with Pharmacist regarding his medications. Pt expressed concerns as to reason his A1C is elevated. Pt talked to about medication fears. Pt stated per the pharmacist medication coverage will be changing next year and he will have to change formulary again. Pt expressed he will discuss this with Endo- Dr. Lilibeth Brooks at his upcoming appt on 12/4. Pt stated he is still taking his Lantus 60 units every morning and he is working on taking his Humalog as directed TID before meals. Pt talked to about fast acting insulin will start working within 10-15 minutes after injecting it. Pt stated \"they keep telling me 27 minutes\". Pt encouraged if he is fearful of taking his insulin 30 minutes prior to eating to just have his medication available so he can knowingly take it before he eats as directed and ordered by Endo. Medical Hx reviewed- pt stated he never has been Dx with Lia Ip Syndrome (1999), pt stated he had [place] Palsy. Pt expressed he would prefer to have the stool sample in place of the colonoscopy since he has no family Hx and his last one was \"clear\" per pt. Pt educated how to utilize the average on his glucometer. (see below)  14d: 172  30d: 182  90d: 183    Pt expressed he would like to have biweekly calls or monthly calls. Pt aware when Mookie Pap will not be avaiolable due to vacations in near future. Writer will call pt 12/12/17 and follow up 2 weeks later. Pt asks appropriate questions. Pt encouraged to ask Dr. Lilibeth Brooks was his goal glucose is and what his goal A1C is. Pt stated Dr. Lilibeth Brooks stated he wants him <7 in the past.         Care Coordination Interventions    Program Enrollment:  Rising Risk  Referral from Primary Care Provider:  No  Suggested Interventions and Community Resources  Diabetes Education:   In Process  Registered Dietician: In Process  Other Services or Interventions: Active with DM Educator. Pt is interested in working with [facility] regarding glucoses and possible 1:1's with ACC RD, JIMENA. DM Educational Materials mailed to pt 7/11/2017         Goals Addressed             Most Recent     Patient Stated (pt-stated)   Improving (11/20/2017)             I want to work on preventing hyperglycemia and my fear of hypoglycemia     Barriers: overwhelmed by complexity of regimen  Plan for overcoming my barriers: Monthly calls with [facility] to discuss DM and ways to improve how I manage it  Confidence: 7/10  Anticipated Goal Completion Date: 7/15/2018              Prior to Admission medications    Medication Sig Start Date End Date Taking? Authorizing Provider   atorvastatin (LIPITOR) 20 MG tablet TAKE ONE TABLET BY MOUTH ONCE DAILY 10/5/17  Yes Rosanna Cabrera CNP   valsartan (DIOVAN) 160 MG tablet TAKE ONE TABLET BY MOUTH DAILY 10/5/17  Yes Rosanna Cabrera CNP   amLODIPine (NORVASC) 5 MG tablet TAKE ONE TABLET BY MOUTH ONCE DAILY 10/5/17  Yes Rosanna Cabrera CNP   insulin glargine (LANTUS) 100 UNIT/ML injection vial Inject 60 Units into the skin every morning (before breakfast) Will start 60 units (10/12/2017)   Yes Historical Provider, MD   HUMALOG KWIKPEN 100 UNIT/ML pen INJECT 10 UNITS SUBCUTANEOUSLY THREE TIMES A DAY ( BEFORE MEALS )  Patient taking differently: INJECT 10-12 UNITS SUBCUTANEOUSLY THREE TIMES A DAY ( BEFORE MEALS ) 7/6/17  Yes Krys Hogan MD   XIGDUO XR 5-500 MG TB24 TAKE ONE TABLET BY MOUTH ONE TIME A DAY 7/6/17  Yes Krys Hogan MD   Insulin Pen Needle 31G X 5 MM MISC 1 each by Does not apply route daily 6/16/17  Yes Ean Hay MD   omeprazole (PRILOSEC) 20 MG delayed release capsule TAKE ONE CAPSULE BY MOUTH TWICE A DAY 3/30/17  Yes Ean Hay MD   glucose blood VI test strips (AGAMATRIX PRESTO TEST) strip Test BS 4-5 times a day. Hyperglycemia. Hypoglycemia.  3/28/17  Yes Nanette Colon

## 2017-12-04 ENCOUNTER — OFFICE VISIT (OUTPATIENT)
Dept: ENDOCRINOLOGY | Age: 67
End: 2017-12-04

## 2017-12-04 VITALS
HEIGHT: 65 IN | WEIGHT: 226.8 LBS | RESPIRATION RATE: 14 BRPM | HEART RATE: 75 BPM | SYSTOLIC BLOOD PRESSURE: 130 MMHG | BODY MASS INDEX: 37.79 KG/M2 | DIASTOLIC BLOOD PRESSURE: 66 MMHG | OXYGEN SATURATION: 97 %

## 2017-12-04 DIAGNOSIS — I10 ESSENTIAL HYPERTENSION: ICD-10-CM

## 2017-12-04 DIAGNOSIS — E78.5 HYPERLIPIDEMIA, UNSPECIFIED HYPERLIPIDEMIA TYPE: ICD-10-CM

## 2017-12-04 DIAGNOSIS — E55.9 VITAMIN D DEFICIENCY: ICD-10-CM

## 2017-12-04 DIAGNOSIS — E11.9 DIABETES MELLITUS WITHOUT COMPLICATION (HCC): Primary | ICD-10-CM

## 2017-12-04 LAB — HBA1C MFR BLD: 8.1 %

## 2017-12-04 PROCEDURE — 1123F ACP DISCUSS/DSCN MKR DOCD: CPT | Performed by: INTERNAL MEDICINE

## 2017-12-04 PROCEDURE — 83036 HEMOGLOBIN GLYCOSYLATED A1C: CPT | Performed by: INTERNAL MEDICINE

## 2017-12-04 PROCEDURE — 3045F PR MOST RECENT HEMOGLOBIN A1C LEVEL 7.0-9.0%: CPT | Performed by: INTERNAL MEDICINE

## 2017-12-04 PROCEDURE — 99214 OFFICE O/P EST MOD 30 MIN: CPT | Performed by: INTERNAL MEDICINE

## 2017-12-04 PROCEDURE — 3017F COLORECTAL CA SCREEN DOC REV: CPT | Performed by: INTERNAL MEDICINE

## 2017-12-04 PROCEDURE — 1036F TOBACCO NON-USER: CPT | Performed by: INTERNAL MEDICINE

## 2017-12-04 PROCEDURE — 4040F PNEUMOC VAC/ADMIN/RCVD: CPT | Performed by: INTERNAL MEDICINE

## 2017-12-04 PROCEDURE — G8417 CALC BMI ABV UP PARAM F/U: HCPCS | Performed by: INTERNAL MEDICINE

## 2017-12-04 PROCEDURE — G8427 DOCREV CUR MEDS BY ELIG CLIN: HCPCS | Performed by: INTERNAL MEDICINE

## 2017-12-04 PROCEDURE — G8484 FLU IMMUNIZE NO ADMIN: HCPCS | Performed by: INTERNAL MEDICINE

## 2017-12-04 PROCEDURE — G8599 NO ASA/ANTIPLAT THER USE RNG: HCPCS | Performed by: INTERNAL MEDICINE

## 2017-12-04 NOTE — PROGRESS NOTES
Endocrinology  Willy Arellano M.D. Phone: [phone]   FAX: [phone]       Abdulkadir Laguna   YOB: 1950    Date of Visit:  12/4/2017    No Known Allergies  Outpatient Prescriptions Marked as Taking for the 12/4/17 encounter (Office Visit) with Jamel Vasquez MD   Medication Sig Dispense Refill    atorvastatin (LIPITOR) 20 MG tablet TAKE ONE TABLET BY MOUTH ONCE DAILY 90 tablet 3    valsartan (DIOVAN) 160 MG tablet TAKE ONE TABLET BY MOUTH DAILY 90 tablet 3    amLODIPine (NORVASC) 5 MG tablet TAKE ONE TABLET BY MOUTH ONCE DAILY 90 tablet 3    insulin glargine (LANTUS) 100 UNIT/ML injection vial Inject 60 Units into the skin every morning (before breakfast) Will start 60 units (10/12/2017)      HUMALOG KWIKPEN 100 UNIT/ML pen INJECT 10 UNITS SUBCUTANEOUSLY THREE TIMES A DAY ( BEFORE MEALS ) (Patient taking differently: INJECT 10-12 UNITS SUBCUTANEOUSLY THREE TIMES A DAY ( BEFORE MEALS )) 30 mL 3    Insulin Pen Needle 31G X 5 MM MISC 1 each by Does not apply route daily 100 each 3    glucose blood VI test strips (AGAMATRIX PRESTO TEST) strip Test BS 4-5 times a day. Hyperglycemia. Hypoglycemia. 450 each 3    Loperamide HCl (IMODIUM A-D PO) Take 1 tablet by mouth as needed       EASY TOUCH INSULIN SYRINGE 31G X 5/16\" 1 ML MISC USE ONCE DAILY FOR LANTUS INJECTION 100 each 3    Cholecalciferol (VITAMIN D3) 1000 UNITS CAPS Take 1,000 each by mouth daily.  aspirin EC 81 MG EC tablet Take 1 tablet by mouth daily. 30 tablet 11         Vitals:    12/04/17 1106 12/04/17 1112   BP: (!) 151/78 130/66   Site: Right Arm Left Arm   Position: Sitting Sitting   Cuff Size: Medium Adult Medium Adult   Pulse: 75    Resp: 14    SpO2: 97%    Weight: 226 lb 12.8 oz (102.9 kg)    Height: 5' 5\" (1.651 m)      Body mass index is 37.74 kg/m².      Wt Readings from Last 3 Encounters:   12/04/17 226 lb 12.8 oz (102.9 kg)   08/31/17 230 lb 3.2 oz (104.4 kg)   07/06/17 227 lb 9.6 oz (103.2 kg)     BP 20-12 units with meals. Xigduo 5-500 mg daily. Previous meds : Unable to tolerate victoza. Trulicity ( nausea)    -200  Lunch 100-200  Dinner 120-200  Bedtime 140-200    No episodes of hypoglycemia. Has Hypoglycemia awareness. Diet:. Eats breakfast at noon, lunch at 5 p.m and dinner at 9 P.M  Had Nutrition education:  No planned exercise. Hyperlipidemia:  Lipitor 20 mg daily. Hypertension: Exforge 5-160 mg daily. Review of Systems   Constitutional: Positive for malaise/fatigue. Negative for weight loss. HENT: Negative for sore throat. Eyes: Negative for blurred vision. Respiratory: Negative for cough and shortness of breath. Cardiovascular: Negative for chest pain and palpitations. Gastrointestinal: Negative for heartburn, nausea, vomiting and abdominal pain. Genitourinary: Negative for urgency and frequency. Musculoskeletal: Positive for joint pain. Negative for myalgias and back pain. Skin: Negative for rash. Neurological: Positive for tingling and sensory change. Negative for headaches. Endo/Heme/Allergies: Negative for polydipsia. Psychiatric/Behavioral: Negative for depression. The patient is not nervous/anxious. Physical Exam   Constitutional: He is oriented to person, place, and time. He appears well-developed. No distress. HENT:   Mouth/Throat: Oropharynx is clear and moist.   Eyes: EOM are normal.   Neck: No thyromegaly present. Cardiovascular: Normal rate and normal heart sounds. Pulmonary/Chest: Effort normal. No respiratory distress. He has no wheezes. Abdominal: Soft. Bowel sounds are normal. There is no tenderness. Musculoskeletal: He exhibits no edema. Neurological: He is alert and oriented to person, place, and time. Skin: Skin is warm and dry. He is not diaphoretic. Psychiatric: His behavior is normal. Thought content normal.                Assessment/Plan      1.  Type 2 DM     Joey Carlson is a 75 y.o. male has Type 2 DM with obesity and insulin resistance. Uncontrolled. A1c of 8.7 %--->8.4 %--->7.8 %--->8.2 %--->7.9 % ---> 7.4 %---> 7.2 %---> 7.6 %---> 7.2 %---> 7.6 %---> 7.8 %--->8.1 %---> 8 %---> 8.6 %---> 8.1 %       He is afraid of hypoglycemia which makes tight glycemic control hard. - Continue lantus 60 units daily.   -Take humalog 10-12 units TID with meals. Insurance is not covering Xigduo. Will start on Synjardy  mg daily. Advised to increase physical activity. Advised follow-up with the ophthalmologist once a year. Last urine microalbumin/cr ratio normal 06/13,  09/14, 01/15, 10/16. Slightly high in 08/17. Discussed foot care. Pt on ASA. Former smoker. 2. Hypertension. BP at goal.    3. Hyperlipidemia. On statins. Lipids at goal.      4. Melanoma S/p surgery.

## 2017-12-05 DIAGNOSIS — E11.9 DIABETES MELLITUS WITHOUT COMPLICATION (HCC): ICD-10-CM

## 2017-12-05 DIAGNOSIS — E78.5 HYPERLIPIDEMIA, UNSPECIFIED HYPERLIPIDEMIA TYPE: ICD-10-CM

## 2017-12-05 LAB
A/G RATIO: 1.8 (ref 1.1–2.2)
ALBUMIN SERPL-MCNC: 4.1 G/DL (ref 3.4–5)
ALP BLD-CCNC: 72 U/L (ref 40–129)
ALT SERPL-CCNC: 22 U/L (ref 10–40)
ANION GAP SERPL CALCULATED.3IONS-SCNC: 10 MMOL/L (ref 3–16)
AST SERPL-CCNC: 19 U/L (ref 15–37)
BILIRUB SERPL-MCNC: 0.5 MG/DL (ref 0–1)
BUN BLDV-MCNC: 12 MG/DL (ref 7–20)
CALCIUM SERPL-MCNC: 9.4 MG/DL (ref 8.3–10.6)
CHLORIDE BLD-SCNC: 101 MMOL/L (ref 99–110)
CO2: 30 MMOL/L (ref 21–32)
CREAT SERPL-MCNC: 0.9 MG/DL (ref 0.8–1.3)
CREATININE URINE: 103.1 MG/DL (ref 39–259)
ESTIMATED AVERAGE GLUCOSE: 191.5 MG/DL
GFR AFRICAN AMERICAN: >60
GFR NON-AFRICAN AMERICAN: >60
GLOBULIN: 2.3 G/DL
GLUCOSE BLD-MCNC: 157 MG/DL (ref 70–99)
HBA1C MFR BLD: 8.3 %
MICROALBUMIN UR-MCNC: 3.8 MG/DL
MICROALBUMIN/CREAT UR-RTO: 36.9 MG/G (ref 0–30)
POTASSIUM SERPL-SCNC: 5 MMOL/L (ref 3.5–5.1)
SODIUM BLD-SCNC: 141 MMOL/L (ref 136–145)
TOTAL PROTEIN: 6.4 G/DL (ref 6.4–8.2)

## 2017-12-12 ENCOUNTER — CARE COORDINATION (OUTPATIENT)
Dept: CARE COORDINATION | Age: 67
End: 2017-12-12

## 2017-12-12 NOTE — CARE COORDINATION
Outreach call attempted. Unable to reach pt. Unable to leave message due to phone continually ringing. Will follow up with pt after the holidays.     Cindy LEONN, RN Care Coordinator  [address] [place] [address] Primary Care  [phone]

## 2017-12-27 ENCOUNTER — CARE COORDINATION (OUTPATIENT)
Dept: CARE COORDINATION | Age: 67
End: 2017-12-27

## 2017-12-27 NOTE — CARE COORDINATION
Outreach call attempted. Unable to reach pt or leave message due to phone continually ringing. Will attempt to reach pt in next 1-2 weeks.     Josette LEONN, RN Care Coordinator  [facility],  [address] [place] [address] Primary Care  [phone]

## 2017-12-27 NOTE — ADDENDUM NOTE
Encounter addended by:  Rosemarie Mondragon RN on: 12/27/2017  4:32 PM<BR>    Actions taken: Contacts section saved, Visit Navigator Flowsheet section accepted, Medication note saved, Home Medications modified, Medication taking status modified, Order Reconciliation Section accessed, Visit Navigator SmartForm Flowsheet section accepted, SmartForm saved, Patient Education documented on, Patient Goal modified, Pend clinical note

## 2017-12-28 NOTE — CARE COORDINATION
Outreach call to pt to inform him he can lower his Lantus and Humalog once he starts [place]. Pt stated he will lower Lantus to 55 units and Humalog to 8-10 units TID with meals. Will follow up with pt on 1/8/2018 to review glucoses.     Aleksandra LEONN, RN Care Coordinator  [address] [place] [address] Primary Care  [phone]

## 2018-01-02 RX ORDER — SYRINGE AND NEEDLE,INSULIN,1ML 31 GX5/16"
SYRINGE, EMPTY DISPOSABLE MISCELLANEOUS
Qty: 100 EACH | Refills: 3 | Status: SHIPPED | OUTPATIENT
Start: 2018-01-02 | End: 2018-12-26 | Stop reason: SDUPTHER

## 2018-01-08 ENCOUNTER — CARE COORDINATION (OUTPATIENT)
Dept: CARE COORDINATION | Age: 68
End: 2018-01-08

## 2018-01-18 ENCOUNTER — CARE COORDINATION (OUTPATIENT)
Dept: CARE COORDINATION | Age: 68
End: 2018-01-18

## 2018-02-13 ENCOUNTER — CARE COORDINATION (OUTPATIENT)
Dept: CARE COORDINATION | Age: 68
End: 2018-02-13

## 2018-02-13 NOTE — CARE COORDINATION
Care Provider:  No  Suggested Interventions and Community Resources  Diabetes Education:  Completed  Registered Dietician:  Not Started  Other Services or Interventions: Active with DM Educator. Pt is interested in working with [facility] regarding glucoses and possible 1:1's with ACC RD, TRAYE. DM Educational Materials mailed to pt 7/11/2017         Goals Addressed             Most Recent     Patient Stated (pt-stated)   On track (2/13/2018)             I want to work on preventing hyperglycemia and my fear of hypoglycemia     Barriers: overwhelmed by complexity of regimen  Plan for overcoming my barriers: Monthly calls with [facility] to discuss DM and ways to improve how I manage it  Confidence: 7/10  Anticipated Goal Completion Date: 7/15/2018              Prior to Admission medications    Medication Sig Start Date End Date Taking?  Authorizing Provider   TRUEPLUS INSULIN SYRINGE 31G X 5/16\" 1 ML MISC USE ONCE DAILY FOR LANTUS INJECTION 1/2/18  Yes Roxanne Bourgeois MD   Empagliflozin-Metformin HCl ER (SYNJARDY XR)  MG TB24 Take 1 tablet by mouth daily 12/4/17  Yes Roxanne Bourgeois MD   atorvastatin (LIPITOR) 20 MG tablet TAKE ONE TABLET BY MOUTH ONCE DAILY 10/5/17  Yes Bryan Mendoza CNP   valsartan (DIOVAN) 160 MG tablet TAKE ONE TABLET BY MOUTH DAILY 10/5/17  Yes Bryan Mendoza CNP   amLODIPine (NORVASC) 5 MG tablet TAKE ONE TABLET BY MOUTH ONCE DAILY 10/5/17  Yes Bryan Mendoza CNP   insulin glargine (LANTUS) 100 UNIT/ML injection vial Inject 55 Units into the skin every morning (before breakfast) Will start 60 units (10/12/2017)   Yes Historical Provider, MD   HUMALOG KWIKPEN 100 UNIT/ML pen INJECT 10 UNITS SUBCUTANEOUSLY THREE TIMES A DAY ( BEFORE MEALS )  Patient taking differently: INJECT 10-12 UNITS SUBCUTANEOUSLY THREE TIMES A DAY ( BEFORE MEALS ) Pt stated he takes 8 units with Breakfast/Lunch and 6 units with dinner as of 2/13/18 7/6/17  Yes Aman Martins MD   Insulin Pen Needle 31G X 5 MM MISC 1 each by

## 2018-02-27 ENCOUNTER — CARE COORDINATION (OUTPATIENT)
Dept: CARE COORDINATION | Age: 68
End: 2018-02-27

## 2018-03-20 VITALS — BODY MASS INDEX: 38.61 KG/M2 | WEIGHT: 232 LBS

## 2018-03-26 RX ORDER — LANCETS
1 EACH MISCELLANEOUS
COMMUNITY
End: 2018-03-26 | Stop reason: SDUPTHER

## 2018-03-26 RX ORDER — LANCETS
1 EACH MISCELLANEOUS
Qty: 800 EACH | Refills: 2 | Status: SHIPPED | OUTPATIENT
Start: 2018-03-26 | End: 2018-09-26 | Stop reason: SDUPTHER

## 2018-03-27 RX ORDER — OMEPRAZOLE 20 MG/1
CAPSULE, DELAYED RELEASE ORAL
Qty: 180 CAPSULE | Refills: 3 | Status: SHIPPED | OUTPATIENT
Start: 2018-03-27 | End: 2019-03-17 | Stop reason: SDUPTHER

## 2018-04-03 ENCOUNTER — CARE COORDINATION (OUTPATIENT)
Dept: CARE COORDINATION | Age: 68
End: 2018-04-03

## 2018-04-04 ENCOUNTER — EMPLOYEE WELLNESS (OUTPATIENT)
Dept: OTHER | Age: 68
End: 2018-04-04

## 2018-04-04 LAB
CHOLESTEROL, TOTAL: 128 MG/DL (ref 0–199)
GLUCOSE BLD-MCNC: 194 MG/DL (ref 70–99)
HDLC SERPL-MCNC: 49 MG/DL (ref 40–60)
LDL CHOLESTEROL CALCULATED: 64 MG/DL
TRIGL SERPL-MCNC: 76 MG/DL (ref 0–150)

## 2018-04-05 LAB
ESTIMATED AVERAGE GLUCOSE: 200.1 MG/DL
HBA1C MFR BLD: 8.6 %

## 2018-04-06 ENCOUNTER — TELEPHONE (OUTPATIENT)
Dept: ENDOCRINOLOGY | Age: 68
End: 2018-04-06

## 2018-04-10 VITALS — WEIGHT: 220 LBS | BODY MASS INDEX: 36.61 KG/M2

## 2018-04-16 ENCOUNTER — OFFICE VISIT (OUTPATIENT)
Dept: ENDOCRINOLOGY | Age: 68
End: 2018-04-16

## 2018-04-16 VITALS
DIASTOLIC BLOOD PRESSURE: 66 MMHG | SYSTOLIC BLOOD PRESSURE: 133 MMHG | HEART RATE: 85 BPM | OXYGEN SATURATION: 94 % | WEIGHT: 222.2 LBS | RESPIRATION RATE: 16 BRPM | HEIGHT: 65 IN | BODY MASS INDEX: 37.02 KG/M2

## 2018-04-16 DIAGNOSIS — E78.5 HYPERLIPIDEMIA, UNSPECIFIED HYPERLIPIDEMIA TYPE: ICD-10-CM

## 2018-04-16 DIAGNOSIS — I10 BENIGN ESSENTIAL HTN: ICD-10-CM

## 2018-04-16 DIAGNOSIS — E11.9 DIABETES MELLITUS WITHOUT COMPLICATION (HCC): Primary | ICD-10-CM

## 2018-04-16 PROCEDURE — G8417 CALC BMI ABV UP PARAM F/U: HCPCS | Performed by: INTERNAL MEDICINE

## 2018-04-16 PROCEDURE — 2022F DILAT RTA XM EVC RTNOPTHY: CPT | Performed by: INTERNAL MEDICINE

## 2018-04-16 PROCEDURE — 99214 OFFICE O/P EST MOD 30 MIN: CPT | Performed by: INTERNAL MEDICINE

## 2018-04-16 PROCEDURE — G8427 DOCREV CUR MEDS BY ELIG CLIN: HCPCS | Performed by: INTERNAL MEDICINE

## 2018-04-16 PROCEDURE — 1036F TOBACCO NON-USER: CPT | Performed by: INTERNAL MEDICINE

## 2018-04-16 PROCEDURE — 1123F ACP DISCUSS/DSCN MKR DOCD: CPT | Performed by: INTERNAL MEDICINE

## 2018-04-16 PROCEDURE — G8599 NO ASA/ANTIPLAT THER USE RNG: HCPCS | Performed by: INTERNAL MEDICINE

## 2018-04-16 PROCEDURE — 3045F PR MOST RECENT HEMOGLOBIN A1C LEVEL 7.0-9.0%: CPT | Performed by: INTERNAL MEDICINE

## 2018-04-16 PROCEDURE — 3017F COLORECTAL CA SCREEN DOC REV: CPT | Performed by: INTERNAL MEDICINE

## 2018-04-16 PROCEDURE — 4040F PNEUMOC VAC/ADMIN/RCVD: CPT | Performed by: INTERNAL MEDICINE

## 2018-04-24 ENCOUNTER — CARE COORDINATION (OUTPATIENT)
Dept: CARE COORDINATION | Age: 68
End: 2018-04-24

## 2018-05-24 ENCOUNTER — HOSPITAL ENCOUNTER (OUTPATIENT)
Dept: DIABETES SERVICES | Age: 68
Discharge: HOME | End: 2018-05-31
Attending: INTERNAL MEDICINE | Admitting: INTERNAL MEDICINE

## 2018-05-24 DIAGNOSIS — E11.9 TYPE 2 DIABETES MELLITUS WITHOUT COMPLICATIONS (HCC): ICD-10-CM

## 2018-06-01 ENCOUNTER — HOSPITAL ENCOUNTER (OUTPATIENT)
Dept: OTHER | Age: 68
Discharge: HOME | End: 2018-06-30
Attending: INTERNAL MEDICINE | Admitting: INTERNAL MEDICINE

## 2018-06-11 ENCOUNTER — CARE COORDINATION (OUTPATIENT)
Dept: CARE COORDINATION | Age: 68
End: 2018-06-11

## 2018-06-11 ASSESSMENT — PATIENT HEALTH QUESTIONNAIRE - PHQ9: SUM OF ALL RESPONSES TO PHQ QUESTIONS 1-9: 0

## 2018-06-14 ENCOUNTER — CARE COORDINATION (OUTPATIENT)
Dept: CARE COORDINATION | Age: 68
End: 2018-06-14

## 2018-06-14 ASSESSMENT — PATIENT HEALTH QUESTIONNAIRE - PHQ9: SUM OF ALL RESPONSES TO PHQ QUESTIONS 1-9: 0

## 2018-06-22 ENCOUNTER — CARE COORDINATION (OUTPATIENT)
Dept: CARE COORDINATION | Age: 68
End: 2018-06-22

## 2018-06-28 RX ORDER — PEN NEEDLE, DIABETIC 31 GX5/16"
NEEDLE, DISPOSABLE MISCELLANEOUS
Qty: 500 EACH | Refills: 3 | Status: SHIPPED | OUTPATIENT
Start: 2018-06-28 | End: 2018-08-23 | Stop reason: SDUPTHER

## 2018-07-11 ENCOUNTER — CARE COORDINATION (OUTPATIENT)
Dept: CARE COORDINATION | Age: 68
End: 2018-07-11

## 2018-07-11 ASSESSMENT — PATIENT HEALTH QUESTIONNAIRE - PHQ9: SUM OF ALL RESPONSES TO PHQ QUESTIONS 1-9: 0

## 2018-07-11 NOTE — CARE COORDINATION
to work on preventing hyperglycemia and my fear of hypoglycemia     Barriers: overwhelmed by complexity of regimen  Plan for overcoming my barriers: Monthly calls with [facility] to discuss DM and ways to improve how I manage it  Confidence: 7/10  Anticipated Goal Completion Date: 1/15/2019              Prior to Admission medications    Medication Sig Start Date End Date Taking? Authorizing Provider   TRUEPLUS PEN NEEDLES 31G X 8 MM MISC USE FOR HUMALOG AND VICTOZA INJECTIONS FOUR TIMES A DAY 6/28/18  Yes Amee Rowland MD   omeprazole (PRILOSEC) 20 MG delayed release capsule TAKE ONE CAPSULE BY MOUTH TWICE A DAY 3/27/18  Yes Amee Rowland MD   glucose blood VI test strips (AGAMATRIX PRESTO TEST) strip Test BS 8 times a day. Hyperglycemia. Hypoglycemia.  3/26/18  Yes Amee Rowland MD   ACCU-CHEK FASTCLIX LANCETS MISC 1 each by Does not apply route 8 times daily 3/26/18  Yes Amee Rowland MD   TRUEPLUS INSULIN SYRINGE 31G X 5/16\" 1 ML MISC USE ONCE DAILY FOR LANTUS INJECTION 1/2/18  Yes Amee Rowland MD   Empagliflozin-Metformin HCl ER (SYNJARDY XR)  MG TB24 Take 1 tablet by mouth daily 12/4/17  Yes Amee Rowland MD   atorvastatin (LIPITOR) 20 MG tablet TAKE ONE TABLET BY MOUTH ONCE DAILY 10/5/17  Yes DEBBI Sebastian CNP   valsartan (DIOVAN) 160 MG tablet TAKE ONE TABLET BY MOUTH DAILY 10/5/17  Yes DEBBI Sebastian CNP   amLODIPine (NORVASC) 5 MG tablet TAKE ONE TABLET BY MOUTH ONCE DAILY 10/5/17  Yes DEBBI Sebastian CNP   insulin glargine (LANTUS) 100 UNIT/ML injection vial Inject 50 Units into the skin every morning (before breakfast)    Yes Historical Provider, MD   HUMALOG KWIKPEN 100 UNIT/ML pen INJECT 10 UNITS SUBCUTANEOUSLY THREE TIMES A DAY ( BEFORE MEALS )  Patient taking differently: INJECT 10-12 UNITS SUBCUTANEOUSLY THREE TIMES A DAY ( BEFORE MEALS ) Pt stated he takes 5-8 units with Breakfast/Lunch and 5 units with dinner as of 2/13/18 7/6/17  Yes Sandee Ruano MD

## 2018-07-12 DIAGNOSIS — I10 BENIGN ESSENTIAL HTN: ICD-10-CM

## 2018-07-12 DIAGNOSIS — E78.5 HYPERLIPIDEMIA, UNSPECIFIED HYPERLIPIDEMIA TYPE: ICD-10-CM

## 2018-07-12 DIAGNOSIS — E11.9 DIABETES MELLITUS WITHOUT COMPLICATION (HCC): ICD-10-CM

## 2018-07-12 LAB
A/G RATIO: 2 (ref 1.1–2.2)
ALBUMIN SERPL-MCNC: 4.3 G/DL (ref 3.4–5)
ALP BLD-CCNC: 73 U/L (ref 40–129)
ALT SERPL-CCNC: 20 U/L (ref 10–40)
ANION GAP SERPL CALCULATED.3IONS-SCNC: 11 MMOL/L (ref 3–16)
AST SERPL-CCNC: 18 U/L (ref 15–37)
BILIRUB SERPL-MCNC: 0.5 MG/DL (ref 0–1)
BUN BLDV-MCNC: 16 MG/DL (ref 7–20)
CALCIUM SERPL-MCNC: 9.3 MG/DL (ref 8.3–10.6)
CHLORIDE BLD-SCNC: 100 MMOL/L (ref 99–110)
CHOLESTEROL, TOTAL: 119 MG/DL (ref 0–199)
CO2: 29 MMOL/L (ref 21–32)
CREAT SERPL-MCNC: 1 MG/DL (ref 0.8–1.3)
CREATININE URINE: 41.6 MG/DL (ref 39–259)
GFR AFRICAN AMERICAN: >60
GFR NON-AFRICAN AMERICAN: >60
GLOBULIN: 2.1 G/DL
GLUCOSE BLD-MCNC: 282 MG/DL (ref 70–99)
HDLC SERPL-MCNC: 46 MG/DL (ref 40–60)
LDL CHOLESTEROL CALCULATED: 45 MG/DL
MICROALBUMIN UR-MCNC: <1.2 MG/DL
MICROALBUMIN/CREAT UR-RTO: NORMAL MG/G (ref 0–30)
POTASSIUM SERPL-SCNC: 5 MMOL/L (ref 3.5–5.1)
SODIUM BLD-SCNC: 140 MMOL/L (ref 136–145)
TOTAL PROTEIN: 6.4 G/DL (ref 6.4–8.2)
TRIGL SERPL-MCNC: 140 MG/DL (ref 0–150)
VLDLC SERPL CALC-MCNC: 28 MG/DL

## 2018-07-13 LAB
ESTIMATED AVERAGE GLUCOSE: 228.8 MG/DL
HBA1C MFR BLD: 9.6 %

## 2018-07-17 ENCOUNTER — OFFICE VISIT (OUTPATIENT)
Dept: ENDOCRINOLOGY | Age: 68
End: 2018-07-17

## 2018-07-17 VITALS
RESPIRATION RATE: 14 BRPM | HEIGHT: 65 IN | SYSTOLIC BLOOD PRESSURE: 123 MMHG | DIASTOLIC BLOOD PRESSURE: 69 MMHG | HEART RATE: 72 BPM | BODY MASS INDEX: 36.15 KG/M2 | WEIGHT: 217 LBS | OXYGEN SATURATION: 95 %

## 2018-07-17 DIAGNOSIS — E11.9 DIABETES MELLITUS WITHOUT COMPLICATION (HCC): Primary | ICD-10-CM

## 2018-07-17 PROCEDURE — 2022F DILAT RTA XM EVC RTNOPTHY: CPT | Performed by: INTERNAL MEDICINE

## 2018-07-17 PROCEDURE — G8599 NO ASA/ANTIPLAT THER USE RNG: HCPCS | Performed by: INTERNAL MEDICINE

## 2018-07-17 PROCEDURE — 4040F PNEUMOC VAC/ADMIN/RCVD: CPT | Performed by: INTERNAL MEDICINE

## 2018-07-17 PROCEDURE — G8417 CALC BMI ABV UP PARAM F/U: HCPCS | Performed by: INTERNAL MEDICINE

## 2018-07-17 PROCEDURE — 1101F PT FALLS ASSESS-DOCD LE1/YR: CPT | Performed by: INTERNAL MEDICINE

## 2018-07-17 PROCEDURE — 3046F HEMOGLOBIN A1C LEVEL >9.0%: CPT | Performed by: INTERNAL MEDICINE

## 2018-07-17 PROCEDURE — 99214 OFFICE O/P EST MOD 30 MIN: CPT | Performed by: INTERNAL MEDICINE

## 2018-07-17 PROCEDURE — 1036F TOBACCO NON-USER: CPT | Performed by: INTERNAL MEDICINE

## 2018-07-17 PROCEDURE — G8427 DOCREV CUR MEDS BY ELIG CLIN: HCPCS | Performed by: INTERNAL MEDICINE

## 2018-07-17 PROCEDURE — 1123F ACP DISCUSS/DSCN MKR DOCD: CPT | Performed by: INTERNAL MEDICINE

## 2018-07-17 PROCEDURE — 3017F COLORECTAL CA SCREEN DOC REV: CPT | Performed by: INTERNAL MEDICINE

## 2018-07-17 NOTE — PROGRESS NOTES
Endocrinology  Nichole Antonio M.D. Phone: [phone]   FAX: [phone]       Mike Cruz   YOB: 1950    Date of Visit:  7/17/2018    No Known Allergies  Outpatient Prescriptions Marked as Taking for the 7/17/18 encounter (Office Visit) with Simona Esteban MD   Medication Sig Dispense Refill    TRUEPLUS PEN NEEDLES 31G X 8 MM MISC USE FOR HUMALOG AND VICTOZA INJECTIONS FOUR TIMES A  each 3    omeprazole (PRILOSEC) 20 MG delayed release capsule TAKE ONE CAPSULE BY MOUTH TWICE A DAY (Patient taking differently: TAKE TWO CAPSULES BY MOUTH  DAILY) 180 capsule 3    glucose blood VI test strips (AGAMATRIX PRESTO TEST) strip Test BS 8 times a day. Hyperglycemia. Hypoglycemia. 800 each 2    TRUEPLUS INSULIN SYRINGE 31G X 5/16\" 1 ML MISC USE ONCE DAILY FOR LANTUS INJECTION 100 each 3    Empagliflozin-Metformin HCl ER (SYNJARDY XR)  MG TB24 Take 1 tablet by mouth daily 90 tablet 3    atorvastatin (LIPITOR) 20 MG tablet TAKE ONE TABLET BY MOUTH ONCE DAILY 90 tablet 3    valsartan (DIOVAN) 160 MG tablet TAKE ONE TABLET BY MOUTH DAILY 90 tablet 3    amLODIPine (NORVASC) 5 MG tablet TAKE ONE TABLET BY MOUTH ONCE DAILY 90 tablet 3    insulin glargine (LANTUS) 100 UNIT/ML injection vial Inject 50 Units into the skin every morning (before breakfast)       HUMALOG KWIKPEN 100 UNIT/ML pen INJECT 10 UNITS SUBCUTANEOUSLY THREE TIMES A DAY ( BEFORE MEALS ) (Patient taking differently: INJECT 10-12 UNITS SUBCUTANEOUSLY THREE TIMES A DAY ( BEFORE MEALS ) Pt stated he takes 5-8 units with Breakfast/Lunch and 5 units with dinner as of 2/13/18) 30 mL 3    Insulin Pen Needle 31G X 5 MM MISC 1 each by Does not apply route daily 100 each 3    Loperamide HCl (IMODIUM A-D PO) Take 1 tablet by mouth as needed       Cholecalciferol (VITAMIN D3) 1000 UNITS CAPS Take 1,000 each by mouth daily.  aspirin EC 81 MG EC tablet Take 1 tablet by mouth daily.  30 tablet 11         Vitals:    07/17/18 %--->7.8 %--->8.2 %--->7.9 % ---> 7.4 %---> 7.2 %---> 7.6 %---> 7.2 %---> 7.6 %---> 7.8 %--->8.1 %---> 8 %---> 8.6 %---> 8.1 %  ---> 8.6 %  ---> 9.6 %     He has developed extreme fear of hypoglycemia. He continues to be afraid of hypoglycemia which has resulted in high A1c  He drinks sugary drink and does not take his meal time insulin if his sugar < 150    I again had a detailed discussion with him today     Tried to educate him of the risks associated with uncontrolled DM        -Continue lantus to 50 units QAM   -Continue Synjardy  mg daily.     -Increase Humalog to 8 units with meals.   -Add low dose SSI    Discussed using sensor to monitor sugars closely. Reviewed option of freestyle danyell and dexcom G 6    Will recommend Dexcom G6 so he can set alarms for low sugars. Will apply for insurance approval.   Advised follow-up with the ophthalmologist once a year. Last urine microalbumin/cr ratio normal 06/13,  09/14, 01/15, 10/16. Slightly high in 08/17. Repeat normal in 07/18     Discussed foot care. Pt on ASA. Former smoker. 2. Hypertension. BP normal.     3. Hyperlipidemia. On statins. Labs in 07/18    HDL 46    LDL 45      4. Melanoma S/p surgery. More than 25 minutess spent with patient with > 50 % time in counseling and coordination of care.

## 2018-07-20 ENCOUNTER — CARE COORDINATION (OUTPATIENT)
Dept: CARE COORDINATION | Age: 68
End: 2018-07-20

## 2018-07-20 NOTE — CARE COORDINATION
Outreach call attempted. Unable to reach pt or leave message due to phone being busy. Pt has appt with Endo coming up (see below). Pt has not contacted writer with questions or concerns. Will follow up in next 4-5 weeks.      Erin LEONN, RN Care Coordinator  [address] [place] [address] Primary Care   [phone]

## 2018-07-23 ENCOUNTER — TELEPHONE (OUTPATIENT)
Dept: ENDOCRINOLOGY | Age: 68
End: 2018-07-23

## 2018-07-23 RX ORDER — LOSARTAN POTASSIUM 100 MG/1
100 TABLET ORAL DAILY
Qty: 90 TABLET | Refills: 3 | Status: SHIPPED | OUTPATIENT
Start: 2018-07-23 | End: 2019-06-12 | Stop reason: SDUPTHER

## 2018-07-23 NOTE — TELEPHONE ENCOUNTER
Pharmacy called to say pt's RX for Valsartan has been recalled. Need to call something else in, preferably lowsartan. Please call pharm bck to confirm.

## 2018-07-23 NOTE — TELEPHONE ENCOUNTER
Prescription sent for Losartan 100 mg daily to [facility] mail order pharmacy.

## 2018-08-21 ENCOUNTER — CARE COORDINATION (OUTPATIENT)
Dept: CARE COORDINATION | Age: 68
End: 2018-08-21

## 2018-08-23 ASSESSMENT — PATIENT HEALTH QUESTIONNAIRE - PHQ9
SUM OF ALL RESPONSES TO PHQ QUESTIONS 1-9: 0
SUM OF ALL RESPONSES TO PHQ QUESTIONS 1-9: 0

## 2018-08-23 NOTE — CARE COORDINATION
(Comment: In process og getting CGM)  Other Services or Interventions: Active with Endo Diabetic Educator in past. Pt is interested in working with [facility] regarding glucoses. DM Educational Materials mailed to pt 7/11/2017         Goals Addressed             Most Recent     Patient Stated (pt-stated)   Worsening (7/11/2018)             I want to work on preventing hyperglycemia and my fear of hypoglycemia     Barriers: overwhelmed by complexity of regimen  Plan for overcoming my barriers: Monthly calls with [facility] RX to discuss DM and ways to improve how I manage it- interested in getting CGM for better control of glucoses  Confidence: 7/10  Anticipated Goal Completion Date: 1/15/2019              Prior to Admission medications    Medication Sig Start Date End Date Taking? Authorizing Provider   losartan (COZAAR) 100 MG tablet Take 1 tablet by mouth daily 7/23/18  Yes Talya Sanchez MD   omeprazole (PRILOSEC) 20 MG delayed release capsule TAKE ONE CAPSULE BY MOUTH TWICE A DAY  Patient taking differently: TAKE TWO CAPSULES BY MOUTH  DAILY 3/27/18  Yes Talya Sanchez MD   glucose blood VI test strips (AGAMATRIX PRESTO TEST) strip Test BS 8 times a day. Hyperglycemia. Hypoglycemia.  3/26/18  Yes Talya Sanchez MD   ACCU-CHEK FASTCLIX LANCETS MISC 1 each by Does not apply route 8 times daily 3/26/18  Yes Talya Sanchez MD   TRUEPLUS INSULIN SYRINGE 31G X 5/16\" 1 ML MISC USE ONCE DAILY FOR LANTUS INJECTION 1/2/18  Yes Talya Sanchez MD   Empagliflozin-Metformin HCl ER (SYNJARDY XR)  MG TB24 Take 1 tablet by mouth daily 12/4/17  Yes Talya Sanchez MD   atorvastatin (LIPITOR) 20 MG tablet TAKE ONE TABLET BY MOUTH ONCE DAILY 10/5/17  Yes DEBIB Hicks CNP   amLODIPine (NORVASC) 5 MG tablet TAKE ONE TABLET BY MOUTH ONCE DAILY 10/5/17  Yes DEBBI Hicks CNP   insulin glargine (LANTUS) 100 UNIT/ML injection vial Inject 50 Units into the skin every morning (before breakfast)    Yes Historical

## 2018-09-07 ENCOUNTER — CARE COORDINATION (OUTPATIENT)
Dept: CARE COORDINATION | Age: 68
End: 2018-09-07

## 2018-09-25 ENCOUNTER — CARE COORDINATION (OUTPATIENT)
Dept: CARE COORDINATION | Age: 68
End: 2018-09-25

## 2018-09-25 RX ORDER — INSULIN GLARGINE 100 [IU]/ML
50 INJECTION, SOLUTION SUBCUTANEOUS
Qty: 5 VIAL | Refills: 2 | Status: SHIPPED | OUTPATIENT
Start: 2018-09-25 | End: 2019-12-20

## 2018-09-25 ASSESSMENT — PATIENT HEALTH QUESTIONNAIRE - PHQ9
SUM OF ALL RESPONSES TO PHQ QUESTIONS 1-9: 0
SUM OF ALL RESPONSES TO PHQ QUESTIONS 1-9: 0

## 2018-09-25 NOTE — CARE COORDINATION
Ambulatory Care Coordination Note  9/25/2018  CM Risk Score: 2  Pau Mortality Risk Score: 2.88    ACC: Dutch Antony, RN    Summary Note: Outreach call to pt. Pt expressed he has noticed a decrease in his glucose readings. Pt Med Rec updated with current amount of insulin pt is taking. Pt stated his range per meals are as follows: Breakfast: 162, Lunch: 200, Dinner 185. Pt expressed he is excited since he was recently contacted by DME that they are going over cost with insurance regarding possible use of a CGM. Pt stated if Dexcom is too expensive he will ask about FreeStyle Libree. Pt is watching carb's more being more attentive to his diet. Pt is aware of upcoming appt with Endo and Derm. Will plan f/u after Endo appt. Pt aware. Diabetes Assessment    Medic Alert ID:  No  Meal Planning:  Avoidance of concentrated sweets   How often do you test your blood sugar?:  Daily, Meals, Other (Comment: test 4-6x daily, 30 minutes after meal starts)   Do you have barriers with adherence to non-pharmacologic self-management interventions? (Nutrition/Exercise/Self-Monitoring):  Yes   Have you ever had to go to the ED for symptoms of low blood sugar?:  No       Increase or Decrease trend in Blood Sugars   Do you have hyperglycemia symptoms?:  Yes (Comment: depends on what pt has eaten (intermitent snacking between meals))   Do you have hypoglycemia symptoms?:  No   Last Blood Sugar Value:  85   Blood Sugar Trends:  Fluctuating (Comment: )          Care Coordination Interventions    Program Enrollment:  Rising Risk  Referral from Primary Care Provider:  No  Suggested Interventions and Community Resources  Diabetes Education:  Completed  Registered Dietician:  Not Started  Other Services: In Process (Comment: In process og getting CGM)  Other Services or Interventions: Active with Endo Diabetic Educator in past. Pt is interested in working with [facility] regarding glucoses.  DM Educational Materials mailed to pt (LANTUS) 100 UNIT/ML injection vial Inject 50 Units into the skin every morning (before breakfast)    Yes Historical Provider, MD   HUMALOG KWIKPEN 100 UNIT/ML pen INJECT 10 UNITS SUBCUTANEOUSLY THREE TIMES A DAY ( BEFORE MEALS )  Patient taking differently: INJECT 10-12 UNITS SUBCUTANEOUSLY THREE TIMES A DAY ( BEFORE MEALS ) Pt stated he takes 5-6 units with Breakfast/Lunch and if >150 depending will take 3-4units 9/25/2018 7/6/17  Yes Kristal Tinajero MD   Loperamide HCl (IMODIUM A-D PO) Take 2 tablets by mouth daily    Yes Historical Provider, MD   Cholecalciferol (VITAMIN D3) 1000 UNITS CAPS Take 1,000 each by mouth daily. Yes Historical Provider, MD   aspirin EC 81 MG EC tablet Take 1 tablet by mouth daily.  4/6/12  Yes Brett Paniagua DO   loratadine (CLARITIN) 10 MG tablet Take 10 mg by mouth daily Indications: PRN only per PT    Yes Historical Provider, MD       Future Appointments  Date Time Provider Nikunj Gonzalezi   10/9/2018 12:00 PM MD Richy Tian Derm YESY   10/23/2018 11:40 AM MD Gal Dale [facility]       Inessa Anderson BSN, RN Care Coordinator  [facility],  [address] [place] [address]   [phone]

## 2018-09-26 RX ORDER — LANCETS
EACH MISCELLANEOUS
Qty: 800 EACH | Refills: 3 | Status: SHIPPED | OUTPATIENT
Start: 2018-09-26 | End: 2019-09-16 | Stop reason: SDUPTHER

## 2018-09-26 RX ORDER — AMLODIPINE BESYLATE 5 MG/1
TABLET ORAL
Qty: 90 TABLET | Refills: 3 | Status: SHIPPED | OUTPATIENT
Start: 2018-09-26 | End: 2019-09-16 | Stop reason: SDUPTHER

## 2018-09-26 RX ORDER — ATORVASTATIN CALCIUM 20 MG/1
TABLET, FILM COATED ORAL
Qty: 90 TABLET | Refills: 3 | Status: SHIPPED | OUTPATIENT
Start: 2018-09-26 | End: 2019-09-16 | Stop reason: SDUPTHER

## 2018-10-09 ENCOUNTER — OFFICE VISIT (OUTPATIENT)
Dept: DERMATOLOGY | Age: 68
End: 2018-10-09
Payer: COMMERCIAL

## 2018-10-09 DIAGNOSIS — Z12.83 SCREENING EXAM FOR SKIN CANCER: ICD-10-CM

## 2018-10-09 DIAGNOSIS — Z86.006 HISTORY OF MELANOMA IN SITU: ICD-10-CM

## 2018-10-09 DIAGNOSIS — L57.0 ACTINIC KERATOSES: Primary | ICD-10-CM

## 2018-10-09 PROCEDURE — G8599 NO ASA/ANTIPLAT THER USE RNG: HCPCS | Performed by: DERMATOLOGY

## 2018-10-09 PROCEDURE — 99213 OFFICE O/P EST LOW 20 MIN: CPT | Performed by: DERMATOLOGY

## 2018-10-09 PROCEDURE — G8427 DOCREV CUR MEDS BY ELIG CLIN: HCPCS | Performed by: DERMATOLOGY

## 2018-10-09 PROCEDURE — 1036F TOBACCO NON-USER: CPT | Performed by: DERMATOLOGY

## 2018-10-09 PROCEDURE — 17000 DESTRUCT PREMALG LESION: CPT | Performed by: DERMATOLOGY

## 2018-10-09 PROCEDURE — G8484 FLU IMMUNIZE NO ADMIN: HCPCS | Performed by: DERMATOLOGY

## 2018-10-09 PROCEDURE — 17003 DESTRUCT PREMALG LES 2-14: CPT | Performed by: DERMATOLOGY

## 2018-10-09 PROCEDURE — 1101F PT FALLS ASSESS-DOCD LE1/YR: CPT | Performed by: DERMATOLOGY

## 2018-10-09 PROCEDURE — 1123F ACP DISCUSS/DSCN MKR DOCD: CPT | Performed by: DERMATOLOGY

## 2018-10-09 PROCEDURE — 4040F PNEUMOC VAC/ADMIN/RCVD: CPT | Performed by: DERMATOLOGY

## 2018-10-09 PROCEDURE — G8417 CALC BMI ABV UP PARAM F/U: HCPCS | Performed by: DERMATOLOGY

## 2018-10-09 PROCEDURE — 3017F COLORECTAL CA SCREEN DOC REV: CPT | Performed by: DERMATOLOGY

## 2018-10-09 NOTE — PROGRESS NOTES
axillary, inguinal lymphadenopathy. Negative hepatosplenomegaly. 2. L helix 1, L cheek 1 - ill-defined irregularly-shaped roughly-scaling thin pink macule(s)/papule(s)     Assessment and Plan     1. History of melanoma in situ - clear today  -Recommend monthly self skin exams   -Educated regarding the ABCDEs of melanoma detection   -Call for any new/changing moles or concerning lesions  -Reviewed sun protective behavior -- sun avoidance during the peak hours of the day, sun-protective clothing (including hat and sunglasses), sunscreen use (water resistant, broad spectrum, SPF at least 30, need for reapplication every 2 to 3 hours), avoidance of tanning beds   -Return for full skin exam q6mo until 11/2021    2. Actinic keratosis(es)  -Edu re: relationship with chronic cumulative sun exposure, low premalignant potential.   -2 lesion(s) treated w/ liquid nitrogen x 2 cycles -  L helix 1, L cheek 1. Edu re: risk of blister formation, discomfort, scar, hypopigmentation. Discussed wound care.

## 2018-10-17 ENCOUNTER — NURSE ONLY (OUTPATIENT)
Dept: FAMILY MEDICINE CLINIC | Age: 68
End: 2018-10-17
Payer: COMMERCIAL

## 2018-10-17 DIAGNOSIS — Z23 NEED FOR INFLUENZA VACCINATION: Primary | ICD-10-CM

## 2018-10-17 PROCEDURE — 90471 IMMUNIZATION ADMIN: CPT | Performed by: FAMILY MEDICINE

## 2018-10-17 PROCEDURE — 90662 IIV NO PRSV INCREASED AG IM: CPT | Performed by: FAMILY MEDICINE

## 2018-10-23 ENCOUNTER — OFFICE VISIT (OUTPATIENT)
Dept: ENDOCRINOLOGY | Age: 68
End: 2018-10-23
Payer: COMMERCIAL

## 2018-10-23 VITALS
DIASTOLIC BLOOD PRESSURE: 75 MMHG | SYSTOLIC BLOOD PRESSURE: 120 MMHG | HEART RATE: 88 BPM | HEIGHT: 65 IN | WEIGHT: 212.8 LBS | OXYGEN SATURATION: 95 % | BODY MASS INDEX: 35.45 KG/M2

## 2018-10-23 DIAGNOSIS — E78.5 HYPERLIPIDEMIA, UNSPECIFIED HYPERLIPIDEMIA TYPE: ICD-10-CM

## 2018-10-23 DIAGNOSIS — I10 BENIGN ESSENTIAL HTN: ICD-10-CM

## 2018-10-23 DIAGNOSIS — E11.9 DIABETES MELLITUS WITHOUT COMPLICATION (HCC): Primary | ICD-10-CM

## 2018-10-23 LAB — HBA1C MFR BLD: 8.6 %

## 2018-10-23 PROCEDURE — G8482 FLU IMMUNIZE ORDER/ADMIN: HCPCS | Performed by: INTERNAL MEDICINE

## 2018-10-23 PROCEDURE — 99214 OFFICE O/P EST MOD 30 MIN: CPT | Performed by: INTERNAL MEDICINE

## 2018-10-23 PROCEDURE — 1123F ACP DISCUSS/DSCN MKR DOCD: CPT | Performed by: INTERNAL MEDICINE

## 2018-10-23 PROCEDURE — G8417 CALC BMI ABV UP PARAM F/U: HCPCS | Performed by: INTERNAL MEDICINE

## 2018-10-23 PROCEDURE — 1036F TOBACCO NON-USER: CPT | Performed by: INTERNAL MEDICINE

## 2018-10-23 PROCEDURE — 2022F DILAT RTA XM EVC RTNOPTHY: CPT | Performed by: INTERNAL MEDICINE

## 2018-10-23 PROCEDURE — G8599 NO ASA/ANTIPLAT THER USE RNG: HCPCS | Performed by: INTERNAL MEDICINE

## 2018-10-23 PROCEDURE — G8427 DOCREV CUR MEDS BY ELIG CLIN: HCPCS | Performed by: INTERNAL MEDICINE

## 2018-10-23 PROCEDURE — 4040F PNEUMOC VAC/ADMIN/RCVD: CPT | Performed by: INTERNAL MEDICINE

## 2018-10-23 PROCEDURE — 3045F PR MOST RECENT HEMOGLOBIN A1C LEVEL 7.0-9.0%: CPT | Performed by: INTERNAL MEDICINE

## 2018-10-23 PROCEDURE — 3017F COLORECTAL CA SCREEN DOC REV: CPT | Performed by: INTERNAL MEDICINE

## 2018-10-23 PROCEDURE — 1101F PT FALLS ASSESS-DOCD LE1/YR: CPT | Performed by: INTERNAL MEDICINE

## 2018-10-23 PROCEDURE — 83036 HEMOGLOBIN GLYCOSYLATED A1C: CPT | Performed by: INTERNAL MEDICINE

## 2018-10-23 NOTE — PROGRESS NOTES
(96.5 kg)   07/17/18 217 lb (98.4 kg)   04/16/18 222 lb 3.2 oz (100.8 kg)     BP Readings from Last 3 Encounters:   10/23/18 120/75   07/17/18 123/69   04/16/18 133/66        Past Medical History:   Diagnosis Date    Asthma     childhood     CAD (coronary artery disease)     Coronary artery calcification seen on CAT scan     GERD (gastroesophageal reflux disease)     Hyperlipidemia     Hypertension     [facility]     Melanoma in situ of skin of buttock ([facility] [facility] 11/2016    RT abdomen    Moderate single current episode of major depressive disorder ([facility] [facility] 7/6/2017    Obesity     Osteoarthritis     Proteinuria     Salcedo Hunt syndrome 1999    Rosacea     Stricture, esophagus     Type II or unspecified type diabetes mellitus without mention of complication, not stated as uncontrolled     Vitamin D deficiency      Past Surgical History:   Procedure Laterality Date    CHOLECYSTECTOMY  2000    COLONOSCOPY  2002    TOE AMPUTATION Left 1980    lawnmower accident- 2 toes    UPPER GASTROINTESTINAL ENDOSCOPY  2002    VASECTOMY       Family History   Problem Relation Age of Onset    Cancer Father         kidney    Diabetes Father     Kidney Disease Father     Diabetes Sister     Cancer Paternal Grandfather         melanoma     History   Smoking Status    Former Smoker    Packs/day: 2.00    Years: 15.00    Types: Cigarettes   Smokeless Tobacco    Never Used     Comment: quit 1999      History   Alcohol Use    Yes     Comment: rarely       HPI      Kerrie Florez is a 70 y.o. male who is here for a follow-up for management of uncontrolled DM. Patient has a PMH of Type 2 DM, hypertension, hyperlipidemia , melanoma ( managed by Dr. Luly estes), obesity. Diagnosed with Diabetes Mellitus type 2 since the age of 38 yrs,  Course has been variable . Microvascular complications: No known retinopathy (Last eye exam: 4/18), No nephropathy . No Peripheral neuropathy  No Macrovascular complications.

## 2018-10-30 ENCOUNTER — CARE COORDINATION (OUTPATIENT)
Dept: CARE COORDINATION | Age: 68
End: 2018-10-30

## 2018-11-26 ENCOUNTER — CARE COORDINATION (OUTPATIENT)
Dept: CARE COORDINATION | Age: 68
End: 2018-11-26

## 2018-11-26 ASSESSMENT — PATIENT HEALTH QUESTIONNAIRE - PHQ9
SUM OF ALL RESPONSES TO PHQ QUESTIONS 1-9: 0
SUM OF ALL RESPONSES TO PHQ QUESTIONS 1-9: 0

## 2018-12-26 ENCOUNTER — CARE COORDINATION (OUTPATIENT)
Dept: CARE COORDINATION | Age: 68
End: 2018-12-26

## 2018-12-26 RX ORDER — SYRINGE AND NEEDLE,INSULIN,1ML 31 GX5/16"
SYRINGE, EMPTY DISPOSABLE MISCELLANEOUS
Qty: 100 EACH | Refills: 3 | Status: SHIPPED | OUTPATIENT
Start: 2018-12-26 | End: 2019-12-20

## 2018-12-26 RX ORDER — EMPAGLIFLOZIN, METFORMIN HYDROCHLORIDE 10; 1000 MG/1; MG/1
TABLET, EXTENDED RELEASE ORAL
Qty: 90 TABLET | Refills: 3 | Status: SHIPPED | OUTPATIENT
Start: 2018-12-26 | End: 2019-12-20

## 2018-12-26 RX ORDER — BLOOD SUGAR DIAGNOSTIC
STRIP MISCELLANEOUS
Qty: 800 EACH | Refills: 3 | Status: SHIPPED | OUTPATIENT
Start: 2018-12-26 | End: 2021-06-21

## 2019-01-09 ENCOUNTER — CARE COORDINATION (OUTPATIENT)
Dept: CARE COORDINATION | Age: 69
End: 2019-01-09

## 2019-01-10 ASSESSMENT — PATIENT HEALTH QUESTIONNAIRE - PHQ9
SUM OF ALL RESPONSES TO PHQ QUESTIONS 1-9: 0
SUM OF ALL RESPONSES TO PHQ QUESTIONS 1-9: 0

## 2019-01-16 ENCOUNTER — SCHEDULED TELEPHONE ENCOUNTER (OUTPATIENT)
Dept: PHARMACY | Facility: CLINIC | Age: 69
End: 2019-01-16

## 2019-01-25 DIAGNOSIS — E11.9 DIABETES MELLITUS WITHOUT COMPLICATION (HCC): ICD-10-CM

## 2019-01-25 DIAGNOSIS — E78.5 HYPERLIPIDEMIA, UNSPECIFIED HYPERLIPIDEMIA TYPE: ICD-10-CM

## 2019-01-25 DIAGNOSIS — I10 BENIGN ESSENTIAL HTN: ICD-10-CM

## 2019-01-25 LAB
A/G RATIO: 1.9 (ref 1.1–2.2)
ALBUMIN SERPL-MCNC: 4.3 G/DL (ref 3.4–5)
ALP BLD-CCNC: 66 U/L (ref 40–129)
ALT SERPL-CCNC: 19 U/L (ref 10–40)
ANION GAP SERPL CALCULATED.3IONS-SCNC: 13 MMOL/L (ref 3–16)
AST SERPL-CCNC: 17 U/L (ref 15–37)
BILIRUB SERPL-MCNC: 0.6 MG/DL (ref 0–1)
BUN BLDV-MCNC: 11 MG/DL (ref 7–20)
CALCIUM SERPL-MCNC: 9.4 MG/DL (ref 8.3–10.6)
CHLORIDE BLD-SCNC: 101 MMOL/L (ref 99–110)
CHOLESTEROL, TOTAL: 129 MG/DL (ref 0–199)
CO2: 29 MMOL/L (ref 21–32)
CREAT SERPL-MCNC: 0.9 MG/DL (ref 0.8–1.3)
CREATININE URINE: 112.4 MG/DL (ref 39–259)
GFR AFRICAN AMERICAN: >60
GFR NON-AFRICAN AMERICAN: >60
GLOBULIN: 2.3 G/DL
GLUCOSE BLD-MCNC: 179 MG/DL (ref 70–99)
HDLC SERPL-MCNC: 43 MG/DL (ref 40–60)
LDL CHOLESTEROL CALCULATED: 66 MG/DL
MICROALBUMIN UR-MCNC: 7.3 MG/DL
MICROALBUMIN/CREAT UR-RTO: 64.9 MG/G (ref 0–30)
POTASSIUM SERPL-SCNC: 4.5 MMOL/L (ref 3.5–5.1)
SODIUM BLD-SCNC: 143 MMOL/L (ref 136–145)
TOTAL PROTEIN: 6.6 G/DL (ref 6.4–8.2)
TRIGL SERPL-MCNC: 100 MG/DL (ref 0–150)
VLDLC SERPL CALC-MCNC: 20 MG/DL

## 2019-01-26 LAB
ESTIMATED AVERAGE GLUCOSE: 205.9 MG/DL
HBA1C MFR BLD: 8.8 %

## 2019-01-28 ENCOUNTER — CARE COORDINATION (OUTPATIENT)
Dept: FAMILY MEDICINE CLINIC | Age: 69
End: 2019-01-28

## 2019-01-28 LAB — TSH, 3RD GENERATION: 3.25 MU/L (ref 0.3–4)

## 2019-01-30 RX ORDER — FLASH GLUCOSE SENSOR
1 KIT MISCELLANEOUS
COMMUNITY
End: 2019-01-30 | Stop reason: SDUPTHER

## 2019-01-30 RX ORDER — FLASH GLUCOSE SENSOR
1 KIT MISCELLANEOUS
Qty: 2 EACH | Refills: 2 | Status: SHIPPED | OUTPATIENT
Start: 2019-01-30 | End: 2019-04-11 | Stop reason: SDUPTHER

## 2019-01-30 RX ORDER — FLASH GLUCOSE SCANNING READER
1 EACH MISCELLANEOUS DAILY
COMMUNITY
End: 2019-01-30 | Stop reason: SDUPTHER

## 2019-01-30 RX ORDER — FLASH GLUCOSE SCANNING READER
1 EACH MISCELLANEOUS DAILY
Qty: 1 DEVICE | Refills: 0 | Status: SHIPPED | OUTPATIENT
Start: 2019-01-30 | End: 2022-01-11

## 2019-02-12 ENCOUNTER — CARE COORDINATION (OUTPATIENT)
Dept: CARE COORDINATION | Age: 69
End: 2019-02-12

## 2019-02-13 ASSESSMENT — PATIENT HEALTH QUESTIONNAIRE - PHQ9
SUM OF ALL RESPONSES TO PHQ QUESTIONS 1-9: 0
SUM OF ALL RESPONSES TO PHQ QUESTIONS 1-9: 0

## 2019-02-22 ENCOUNTER — CARE COORDINATION (OUTPATIENT)
Dept: CARE COORDINATION | Age: 69
End: 2019-02-22

## 2019-02-22 ASSESSMENT — PATIENT HEALTH QUESTIONNAIRE - PHQ9
SUM OF ALL RESPONSES TO PHQ QUESTIONS 1-9: 1
SUM OF ALL RESPONSES TO PHQ QUESTIONS 1-9: 1

## 2019-02-26 ENCOUNTER — TELEPHONE (OUTPATIENT)
Dept: PHARMACY | Facility: CLINIC | Age: 69
End: 2019-02-26

## 2019-02-28 ENCOUNTER — OFFICE VISIT (OUTPATIENT)
Dept: ENDOCRINOLOGY | Age: 69
End: 2019-02-28
Payer: COMMERCIAL

## 2019-02-28 VITALS
WEIGHT: 202 LBS | DIASTOLIC BLOOD PRESSURE: 72 MMHG | HEIGHT: 65 IN | HEART RATE: 104 BPM | SYSTOLIC BLOOD PRESSURE: 156 MMHG | OXYGEN SATURATION: 95 % | BODY MASS INDEX: 33.66 KG/M2

## 2019-02-28 DIAGNOSIS — E78.5 HYPERLIPIDEMIA, UNSPECIFIED HYPERLIPIDEMIA TYPE: ICD-10-CM

## 2019-02-28 DIAGNOSIS — I10 BENIGN ESSENTIAL HTN: ICD-10-CM

## 2019-02-28 DIAGNOSIS — E11.9 DIABETES MELLITUS WITHOUT COMPLICATION (HCC): Primary | ICD-10-CM

## 2019-02-28 PROCEDURE — 3045F PR MOST RECENT HEMOGLOBIN A1C LEVEL 7.0-9.0%: CPT | Performed by: INTERNAL MEDICINE

## 2019-02-28 PROCEDURE — G8482 FLU IMMUNIZE ORDER/ADMIN: HCPCS | Performed by: INTERNAL MEDICINE

## 2019-02-28 PROCEDURE — G8427 DOCREV CUR MEDS BY ELIG CLIN: HCPCS | Performed by: INTERNAL MEDICINE

## 2019-02-28 PROCEDURE — 1101F PT FALLS ASSESS-DOCD LE1/YR: CPT | Performed by: INTERNAL MEDICINE

## 2019-02-28 PROCEDURE — 99214 OFFICE O/P EST MOD 30 MIN: CPT | Performed by: INTERNAL MEDICINE

## 2019-02-28 PROCEDURE — 4040F PNEUMOC VAC/ADMIN/RCVD: CPT | Performed by: INTERNAL MEDICINE

## 2019-02-28 PROCEDURE — 1036F TOBACCO NON-USER: CPT | Performed by: INTERNAL MEDICINE

## 2019-02-28 PROCEDURE — 3017F COLORECTAL CA SCREEN DOC REV: CPT | Performed by: INTERNAL MEDICINE

## 2019-02-28 PROCEDURE — 1123F ACP DISCUSS/DSCN MKR DOCD: CPT | Performed by: INTERNAL MEDICINE

## 2019-02-28 PROCEDURE — 95251 CONT GLUC MNTR ANALYSIS I&R: CPT | Performed by: INTERNAL MEDICINE

## 2019-02-28 PROCEDURE — G8417 CALC BMI ABV UP PARAM F/U: HCPCS | Performed by: INTERNAL MEDICINE

## 2019-02-28 PROCEDURE — G8599 NO ASA/ANTIPLAT THER USE RNG: HCPCS | Performed by: INTERNAL MEDICINE

## 2019-02-28 PROCEDURE — 2022F DILAT RTA XM EVC RTNOPTHY: CPT | Performed by: INTERNAL MEDICINE

## 2019-03-07 ENCOUNTER — OFFICE VISIT (OUTPATIENT)
Dept: ENDOCRINOLOGY | Age: 69
End: 2019-03-07
Payer: COMMERCIAL

## 2019-03-07 ENCOUNTER — CARE COORDINATION (OUTPATIENT)
Dept: CARE COORDINATION | Age: 69
End: 2019-03-07

## 2019-03-07 DIAGNOSIS — E11.9 DIABETES MELLITUS WITHOUT COMPLICATION (HCC): Primary | ICD-10-CM

## 2019-03-07 DIAGNOSIS — F32.1 MODERATE SINGLE CURRENT EPISODE OF MAJOR DEPRESSIVE DISORDER (HCC): Primary | ICD-10-CM

## 2019-03-07 PROCEDURE — 97802 MEDICAL NUTRITION INDIV IN: CPT | Performed by: DIETITIAN, REGISTERED

## 2019-03-07 ASSESSMENT — SLEEP AND FATIGUE QUESTIONNAIRES
HOW MANY HOURS OF SLEEP ARE YOU GETTING, ON AVERAGE: LESS THAN 7
HAVE YOU EVER BEEN TESTED FOR SLEEP APNEA: NO
HOW DO YOU RATE THE QUALITY OF YOUR SLEEP: POOR
HAVE YOU BEEN TOLD, OR NOTICED ON YOUR OWN, THAT YOU STOP BREATHING OR STRUGGLE TO BREATHE IN YOUR SLEEP: NO

## 2019-03-07 ASSESSMENT — PROBLEM AREAS IN DIABETES QUESTIONNAIRE (PAID): FEELING SCARED WHEN YOU THINK ABOUT LIVING WITH DIABETES: 4

## 2019-03-07 ASSESSMENT — PATIENT HEALTH QUESTIONNAIRE - PHQ9: SUM OF ALL RESPONSES TO PHQ QUESTIONS 1-9: 17

## 2019-03-11 ENCOUNTER — EMPLOYEE WELLNESS (OUTPATIENT)
Dept: OTHER | Age: 69
End: 2019-03-11

## 2019-03-11 LAB
CHOLESTEROL, TOTAL: 122 MG/DL (ref 0–199)
GLUCOSE BLD-MCNC: 341 MG/DL (ref 70–99)
HDLC SERPL-MCNC: 38 MG/DL (ref 40–60)
LDL CHOLESTEROL CALCULATED: 48 MG/DL
TRIGL SERPL-MCNC: 178 MG/DL (ref 0–150)

## 2019-03-12 LAB
ESTIMATED AVERAGE GLUCOSE: 205.9 MG/DL
HBA1C MFR BLD: 8.8 %

## 2019-03-18 RX ORDER — OMEPRAZOLE 20 MG/1
CAPSULE, DELAYED RELEASE ORAL
Qty: 180 CAPSULE | Refills: 0 | Status: SHIPPED | OUTPATIENT
Start: 2019-03-18 | End: 2019-06-12 | Stop reason: SDUPTHER

## 2019-03-20 VITALS — WEIGHT: 202 LBS | BODY MASS INDEX: 33.61 KG/M2

## 2019-03-21 ENCOUNTER — OFFICE VISIT (OUTPATIENT)
Dept: ENDOCRINOLOGY | Age: 69
End: 2019-03-21
Payer: COMMERCIAL

## 2019-03-21 ENCOUNTER — TELEPHONE (OUTPATIENT)
Dept: ENDOCRINOLOGY | Age: 69
End: 2019-03-21

## 2019-03-21 DIAGNOSIS — E11.9 DIABETES MELLITUS WITHOUT COMPLICATION (HCC): ICD-10-CM

## 2019-03-21 DIAGNOSIS — E11.9 TYPE 2 DIABETES MELLITUS WITHOUT COMPLICATION, WITH LONG-TERM CURRENT USE OF INSULIN (HCC): Primary | ICD-10-CM

## 2019-03-21 DIAGNOSIS — Z79.4 TYPE 2 DIABETES MELLITUS WITHOUT COMPLICATION, WITH LONG-TERM CURRENT USE OF INSULIN (HCC): Primary | ICD-10-CM

## 2019-03-21 PROCEDURE — 97802 MEDICAL NUTRITION INDIV IN: CPT | Performed by: DIETITIAN, REGISTERED

## 2019-04-09 ENCOUNTER — OFFICE VISIT (OUTPATIENT)
Dept: DERMATOLOGY | Age: 69
End: 2019-04-09
Payer: COMMERCIAL

## 2019-04-09 DIAGNOSIS — Z86.006 HISTORY OF MELANOMA IN SITU: ICD-10-CM

## 2019-04-09 DIAGNOSIS — Z12.83 SCREENING EXAM FOR SKIN CANCER: ICD-10-CM

## 2019-04-09 DIAGNOSIS — L57.0 ACTINIC KERATOSES: Primary | ICD-10-CM

## 2019-04-09 PROCEDURE — G8427 DOCREV CUR MEDS BY ELIG CLIN: HCPCS | Performed by: DERMATOLOGY

## 2019-04-09 PROCEDURE — 1036F TOBACCO NON-USER: CPT | Performed by: DERMATOLOGY

## 2019-04-09 PROCEDURE — G8599 NO ASA/ANTIPLAT THER USE RNG: HCPCS | Performed by: DERMATOLOGY

## 2019-04-09 PROCEDURE — 1123F ACP DISCUSS/DSCN MKR DOCD: CPT | Performed by: DERMATOLOGY

## 2019-04-09 PROCEDURE — 99213 OFFICE O/P EST LOW 20 MIN: CPT | Performed by: DERMATOLOGY

## 2019-04-09 PROCEDURE — 4040F PNEUMOC VAC/ADMIN/RCVD: CPT | Performed by: DERMATOLOGY

## 2019-04-09 PROCEDURE — 17000 DESTRUCT PREMALG LESION: CPT | Performed by: DERMATOLOGY

## 2019-04-09 PROCEDURE — 3017F COLORECTAL CA SCREEN DOC REV: CPT | Performed by: DERMATOLOGY

## 2019-04-09 PROCEDURE — G8417 CALC BMI ABV UP PARAM F/U: HCPCS | Performed by: DERMATOLOGY

## 2019-04-09 NOTE — PROGRESS NOTES
[facility]) Dermatology  Dm Bernal MD  [phone]      Bello Oz  1950    70 y.o. male     Date of Visit: 4/9/2019    Last Visit: 1yr    Chief Complaint: Skin check    History of Present Illness:  1. Here for skin check. 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  -(+) family hx melanoma - grandfather   -Denies new moles. Denies moles changing in size, shape, color. Denies moles associated w/ pain, pruritus, bleeding.  -Avoids sun as much as possible. Has been using SPF 48 sunscreen but not outdoors much anymore. 2. History of actinic keratoses s/p cryotherapy. Reports a rough lesion on L ear. Review of Systems:  Constitutional: Reports general sense of well-being. Skin: No interval severe sunburns. Allergies: Reviewed and updated. Past Medical History, Surgical History, Medications and Allergies reviewed.      Past Medical History:   Diagnosis Date    Asthma     childhood     CAD (coronary artery disease)     Coronary artery calcification seen on CAT scan     GERD (gastroesophageal reflux disease)     Hyperlipidemia     Hypertension     Melanoma in situ ([address]     Melanoma in situ of skin of buttock ([address] 11/2016    RT abdomen    Moderate single current episode of major depressive disorder ([address] 7/6/2017    Obesity     Osteoarthritis     Proteinuria     Salcedo Hunt syndrome 1999    Rosacea     Stricture, esophagus     Type II or unspecified type diabetes mellitus without mention of complication, not stated as uncontrolled     Vitamin D deficiency      Past Surgical History:   Procedure Laterality Date    CHOLECYSTECTOMY  2000    COLONOSCOPY  2002    TOE AMPUTATION Left 1980    lawnmower accident- 2 toes    UPPER GASTROINTESTINAL ENDOSCOPY  2002    VASECTOMY         No Known Allergies  Outpatient Medications Marked as Taking for the 4/9/19 encounter (Office Visit) with Dm Bernal MD   Medication Sig Dispense Refill    omeprazole (PRILOSEC) 20 MG delayed release capsule TAKE 1 CAPSULE BY MOUTH TWO TIMES A  capsule 0    Continuous Blood Gluc  (FREESTYLE LA 14 DAY READER) DORA 1 each by Does not apply route daily 1 Device 0    Continuous Blood Gluc Sensor (FREESTYLE LA 14 DAY SENSOR) MISC Inject 1 each into the skin every 14 days 2 each 2    AGAMATRIX PRESTO TEST strip TEST BLOOD SUGAR 8 TIMES DAILY 800 each 3    SYNJARDY XR  MG TB24 TAKE ONE TABLET BY MOUTH ONE TIME A DAY 90 tablet 3    TRUEPLUS INSULIN SYRINGE 31G X 5/16\" 1 ML MISC USE DAILY 100 each 3    amLODIPine (NORVASC) 5 MG tablet TAKE ONE TABLET BY MOUTH ONE TIME A DAY 90 tablet 3    ACCU-CHEK FASTCLIX LANCETS MISC USE 8 TIMES DAILY AS DIRECTED 800 each 3    atorvastatin (LIPITOR) 20 MG tablet TAKE ONE TABLET BY MOUTH ONE TIME A DAY 90 tablet 3    insulin lispro (HUMALOG KWIKPEN) 100 UNIT/ML pen INJECT 10 UNITS SUBCUTANEOUSLY THREE TIMES A DAY ( BEFORE MEALS ) (Patient taking differently: Inject 0-10 Units into the skin 3 times daily (before meals) ) 30 mL 3    insulin glargine (LANTUS) 100 UNIT/ML injection vial Inject 50 Units into the skin every morning (before breakfast) (Patient taking differently: Inject 50 Units into the skin every morning (before breakfast) Decreased dose to 44units (1/23/2019) due to having hypoglycemia events) 5 vial 2    losartan (COZAAR) 100 MG tablet Take 1 tablet by mouth daily 90 tablet 3    Cholecalciferol (VITAMIN D3) 1000 UNITS CAPS Take 1,000 each by mouth daily.  aspirin EC 81 MG EC tablet Take 1 tablet by mouth daily. 30 tablet 11     Social History: Retired      Physical Examination     The following were examined and determined to be normal: Psych/Neuro, Scalp/hair,  Conjunctivae/eyelids, Gums/teeth/lips, Neck, Nails/digits and Genitalia/groin/buttocks. The following were examined and determined to be abnormal: Head/face,Breast/axilla/chest, Abdomen, Back, RUE, LUE, RLE and LLE.      -General: Well-appearing, NAD  1. R upper abdomen - linear surgical scar. No papules, nodularity or dyspigmentation appreciated. -Scattered on the trunk and extremities are multiple well-defined round and oval symmetric smoothly-bordered uniformly brown macules and papules.  -Negative cervical, axillary, inguinal lymphadenopathy. Negative hepatosplenomegaly. 2. L helix 1 - ill-defined irregularly-shaped roughly-scaling thin pink macule(s)/papule(s)     Assessment and Plan     1. History of melanoma in situ - clear today  -Recommend monthly self skin exams   -Educated regarding the ABCDEs of melanoma detection   -Call for any new/changing moles or concerning lesions  -Reviewed sun protective behavior -- sun avoidance during the peak hours of the day, sun-protective clothing (including hat and sunglasses), sunscreen use (water resistant, broad spectrum, SPF at least 30, need for reapplication every 2 to 3 hours), avoidance of tanning beds   -Return for full skin exam q6mo until 11/2021    2. Actinic keratosis(es)  -Edu re: relationship with chronic cumulative sun exposure, low premalignant potential.   -1 lesion(s) treated w/ liquid nitrogen x 2 cycles - L helix. Edu re: risk of blister formation, discomfort, scar, hypopigmentation. Discussed wound care.

## 2019-04-10 ENCOUNTER — CARE COORDINATION (OUTPATIENT)
Dept: CARE COORDINATION | Age: 69
End: 2019-04-10

## 2019-04-10 ASSESSMENT — PATIENT HEALTH QUESTIONNAIRE - PHQ9
SUM OF ALL RESPONSES TO PHQ QUESTIONS 1-9: 0
SUM OF ALL RESPONSES TO PHQ QUESTIONS 1-9: 0

## 2019-04-10 NOTE — CARE COORDINATION
Ambulatory Care Coordination Note  4/10/2019  CM Risk Score: 3  Pau Mortality Risk Score: 2.88    ACC: Ventura Levi, RN    Summary Note: Outreach call to pt. Pt happy he had opportunity to met with RD at [facility]. Pt stated his glucoses are still fluctuating (130-260 in the past week)- he has noticed when he deviates up to 40 points higher. Pt and ACC discussed he may benefit going to a DM support group. ACC will provide list of support groups. Will plan on mtg with pt at [facility] at 2pm on 4/11. Diabetes Assessment    Medic Alert ID:  No  Meal Planning:  Avoidance of concentrated sweets   How often do you test your blood sugar?:  Daily, Meals, Other (Comment: test 4-6x daily, 30 minutes after meal starts)   Do you have barriers with adherence to non-pharmacologic self-management interventions?  (Nutrition/Exercise/Self-Monitoring):  Yes   Have you ever had to go to the ED for symptoms of low blood sugar?:  No       Increase or Decrease trend in Blood Sugars   Do you have hyperglycemia symptoms?:  Yes   Frequency of Episodes:  3 Per:  Day   Do you have hypoglycemia symptoms?:  No   Last Blood Sugar Value:  186   Blood Sugar Trends:  Fluctuating          Care Coordination Interventions    Program Enrollment:  Rising Risk  Referral from Primary Care Provider:  No  Suggested Interventions and Community Resources  Diabetes Education:  Completed  Disease Specific Clinic:  Completed (Comment: See Care Team)  Medication Assistance Program:  Not Started  Pharmacist:  Completed (Comment: [facility] referral)  Registered Dietician:  Completed (Comment: has seen Outpt DM Classes)  Other Services:  Completed (Comment: FreeStyle Selene CGM)  Zone Management Tools:  Completed (Comment: DM Zone management)  Other Services or Interventions:  Endo Diabetic Educator in past          Goals Addressed                 This Visit's Progress     Conditions and Symptoms   On track     I will notify my provider of any symptoms that indicate a worsening of my condition. Barriers: overwhelmed by complexity of regimen and stress  Plan for overcoming my barriers: I will call my providers or my [facility] RN if I have questions regarding my DM  Confidence: 7/10  Anticipated Goal Completion Date: 7/28/2019  - Pt stated since working with RD at [facility]. Pt is now eating meals every 4 hours (8,12,5pm) Pt still having fluctuations with his glucoses. Pt encouraged to have a rescue item with him (15 grams of carb). Discussed patterns on his CGM. 4/10/2019         COMPLETED: Patient Stated (pt-stated)        I want to work on preventing hyperglycemia and my fear of hypoglycemia     Barriers: overwhelmed by complexity of regimen  Plan for overcoming my barriers: Monthly calls with [facility] RN to discuss DM and ways to improve how I manage it- interested in getting CGM for better control of glucoses  Confidence: 7/10  Anticipated Goal Completion Date: 12/15/2019  - Pt testing glucose between 4-8x daily. Pts glucoses have decreased and he is happy. Pt stated he knows what is now causing highs (snacking between meals). Pt aware of upcoming appts. [facility] and is awaiting to hear cost of CGM. If Dexcom too much, pt expressed interest in FreeStyle Selene 9/25/2018  - Pt glucoses still lowest fasting and pt will purposely not take insulin prior to eating due to being \"sensitive\" and bottoming out \"fast\". Pt expressed he may go to [email or web address] or another local pharmacy to check on cost of FreeStyle Libree. 10/30/2018  - Pt testing 6-10x daily. Pt has lost weight currently 205lb- now, was 235lb after starting Synjardy. 11/26/2018  - Pt testing 12+x daily. Pt stated he is still having hyperglycemic episodes daily after his meals 1/10/2019  - Pt requesting Free Style Selene 14 day sensor to be sent to [facility] 1/29/2019  - Pt has his Free [facility].  Pt referred to [facility] to disucss his ordering from [facility] and go over Camden Kilgore MD   Cholecalciferol (VITAMIN D3) 1000 UNITS CAPS Take 1,000 each by mouth daily. Yes Historical Provider, MD   aspirin EC 81 MG EC tablet Take 1 tablet by mouth daily.  4/6/12  Yes [facility], DO       Future Appointments   Date Time Provider Nikunj Casanova   4/25/2019 10:30 AM Vijay Lee RD, LD [facility]   6/6/2019 11:40 AM MD Marcia Morton [facility]   10/8/2019 10:00 AM Chel Clinton MD [place] [facility]         Greta Pulido BSN, RN Care Coordinator  [address] [place] [address] &  [facility]   [phone]

## 2019-04-11 ENCOUNTER — CARE COORDINATION (OUTPATIENT)
Dept: CARE COORDINATION | Age: 69
End: 2019-04-11

## 2019-04-11 RX ORDER — FLASH GLUCOSE SENSOR
KIT MISCELLANEOUS
Qty: 3 EACH | Refills: 3 | Status: SHIPPED | OUTPATIENT
Start: 2019-04-11 | End: 2019-10-11 | Stop reason: SDUPTHER

## 2019-04-11 NOTE — CARE COORDINATION
Met with pt. Had lengthy discussion about Glucerna and his diet. Due to noted fluctuations with glucose after taking his meal time insulin- pt may not benefit taking insulin if only taking/drinking 1 Glucerna (14-16grams of carb). Pt stated he will discuss this with RD at [address] when he sees her next week. Pt also planning on asking for letter to change Endos due to fear of driving in weather conditions etc. Pt offered Support Group List from [facility] location to [place] towards [address]. Pt declined due to not wanting to drive. Pt aware Counseling may benefit him- however not interested in having a counseling/therapist in the [place] [id number] to know his life story. ACC discussed pt looking into counseling options in the community or looking into an option for Cognitive Behavior Therapy- which may help him start working toward options of how to address what caused him anxiety in his past? Will plan on f/u with pt in next 1-2 weeks. Diabetes Assessment    Medic Alert ID:  No  Meal Planning:  Avoidance of concentrated sweets   How often do you test your blood sugar?:  Daily, Meals, Other (Comment: test 4-6x daily, 30 minutes after meal starts)   Do you have barriers with adherence to non-pharmacologic self-management interventions?  (Nutrition/Exercise/Self-Monitoring):  Yes   Have you ever had to go to the ED for symptoms of low blood sugar?:  No       Increase or Decrease trend in Blood Sugars   Do you have hyperglycemia symptoms?:  Yes (Comment: wearing CGM- several daily)   Do you have hypoglycemia symptoms?:  No (Comment: lowest CGM reading 116)   Last Blood Sugar Value:  231   Blood Sugar Trends:  Fluctuating (Comment: most highs are post prandial (breakfast/lunch))        FU appts/Provider:    Future Appointments   Date Time Provider Nikunj Casanova   4/25/2019 10:30 AM Curtis Cordero RD, [facility]   6/6/2019 11:40 AM MD Kenia June [facility]   10/8/2019 10:00 AM Marielle Denise Sand[facility], RN Care Coordinator  [facility],  [facility] &  [facility]   [phone]

## 2019-05-10 ENCOUNTER — CARE COORDINATION (OUTPATIENT)
Dept: CARE COORDINATION | Age: 69
End: 2019-05-10

## 2019-05-10 ASSESSMENT — PATIENT HEALTH QUESTIONNAIRE - PHQ9
SUM OF ALL RESPONSES TO PHQ QUESTIONS 1-9: 2
SUM OF ALL RESPONSES TO PHQ QUESTIONS 1-9: 2

## 2019-05-10 NOTE — CARE COORDINATION
Ambulatory Care Coordination Note  5/10/2019  CM Risk Score: 3  Pau Mortality Risk Score: 2.88    ACC: Carolina Talbot RN    Summary Note: Outreach call to pt. Pt continues to have concerns about leaving his home. Pt talked to about change can be difficult, however for him to know how it will effect him, he will have to try it to know the effects. Pt and pt's wife voiced understanding. Pt voiced he has noticed it to be difficult for him to walk long distances when he is out due to lack of mobility /ambulation. Pt voiced he may have to reschedule his appt with Endo in June due to the time and his eating schedule. ACC encouraged him to take a food item(s) with him and go to his appt, check his glucose and go get a meal on his way home. Pt stated, \"well that may work? \" Pt assisted with yearly f/u appt and message sent to PCP about not knowing if he had a MMR vaccine or the measles in general. Pt voiced since he is seeing Endo, he would be okay to have his A1c checked at time of PCP appt. Will plan on f/u in next 2-3 weeks. Pt aware. Diabetes Assessment    Medic Alert ID:  No  Meal Planning:  Avoidance of concentrated sweets   How often do you test your blood sugar?:  Daily, Meals, Other (Comment: test 4-6x daily, 30 minutes after meal starts)   Do you have barriers with adherence to non-pharmacologic self-management interventions?  (Nutrition/Exercise/Self-Monitoring):  Yes   Have you ever had to go to the ED for symptoms of low blood sugar?:  No       Increase or Decrease trend in Blood Sugars   Do you have hyperglycemia symptoms?:  Yes (Comment: today glucose 223 after eating and taking 4 units of Humalog)    Per:  Day   Do you have hypoglycemia symptoms?:  No   Last Blood Sugar Value:  223   Blood Sugar Trends:  Fluctuating          Care Coordination Interventions    Program Enrollment:  Rising Risk  Referral from Primary Care Provider:  No  Suggested Interventions and Community Resources  Diabetes Education: Completed  Disease Specific Clinic:  Completed (Comment: See Care Team)  Medication Assistance Program:  Not Started  Pharmacist:  Completed (Comment: [place] referral)  Registered Dietician:  Completed (Comment: has seen Outpt DM Classes)  Other Services:  Completed (Comment: FreeStyle Selene CGM)  Zone Management Tools:  Completed (Comment: DM Zone management)  Other Services or Interventions:  Endo Diabetic Educator in past          Goals Addressed                 This Visit's Progress     Conditions and Symptoms   Improving     I will notify my provider of any symptoms that indicate a worsening of my condition. Barriers: overwhelmed by complexity of regimen and stress  Plan for overcoming my barriers: I will call my providers or my [facility] RN if I have questions regarding my DM  Confidence: 7/10  Anticipated Goal Completion Date: 7/28/2019  - Pt stated since working with RD at [facility]. Pt is now eating meals every 4 hours (8,12,5pm) Pt still having fluctuations with his glucoses. Pt encouraged to have a rescue item with him (15 grams of carb). Discussed patterns on his CGM. 4/10/2019  - Met with pt discussed possible need for Counseling/Support Group options. Declined Support Group List due to locations, [facility] 4/11/19  - Pt still having concerns about glucoses when they are in the 130-160 range due to previous hypo events. Pt talked to about having a food items/glucose tabs on him when out or consider discussing decreasing his insulin dose if he eats prior to going out. Pt voiced understanding. 5/10/2019              Prior to Admission medications    Medication Sig Start Date End Date Taking?  Authorizing Provider   Continuous Blood Gluc Sensor (FREESTYLE SELENE 14 DAY SENSOR) MISC ONE UNDER THE SKIN EVERY 14 DAYS 4/11/19  Yes Dat Encarnacion MD   omeprazole (PRILOSEC) 20 MG delayed release capsule TAKE 1 CAPSULE BY MOUTH TWO TIMES A DAY 3/18/19  Yes Mikey Edmond MD   Continuous Blood Gluc  (FREESTYLE LA 14 DAY READER) DORA 1 each by Does not apply route daily 1/30/19  Yes Mikey Edmond MD   AGAMATRIX PRESTO TEST strip TEST BLOOD SUGAR 8 TIMES DAILY 12/26/18  Yes Mikey Edmond MD   SYNJARDY XR  MG TB24 TAKE ONE TABLET BY MOUTH ONE TIME A DAY 12/26/18  Yes Mikey Edmond MD   TRUEPLUS INSULIN SYRINGE 31G X 5/16\" 1 ML MISC USE DAILY 12/26/18  Yes Mikey Edmond MD   amLODIPine (NORVASC) 5 MG tablet TAKE ONE TABLET BY MOUTH ONE TIME A DAY 9/26/18  Yes Mikey Edmond MD   ACCU-CHEK FASTCLIX LANCETS MISC USE 8 TIMES DAILY AS DIRECTED 9/26/18  Yes Mikey Edmond MD   atorvastatin (LIPITOR) 20 MG tablet TAKE ONE TABLET BY MOUTH ONE TIME A DAY 9/26/18  Yes Mikey Edmond MD   insulin lispro (HUMALOG KWIKPEN) 100 UNIT/ML pen INJECT 10 UNITS SUBCUTANEOUSLY THREE TIMES A DAY ( BEFORE MEALS )  Patient taking differently: Inject 0-10 Units into the skin 3 times daily (before meals)  9/25/18  Yes Mikey Edmond MD   insulin glargine (LANTUS) 100 UNIT/ML injection vial Inject 50 Units into the skin every morning (before breakfast)  Patient taking differently: Inject 40 Units into the skin every morning (before breakfast)  9/25/18  Yes Mikey Edmond MD   losartan (COZAAR) 100 MG tablet Take 1 tablet by mouth daily 7/23/18  Yes Mikey Edmond MD   Cholecalciferol (VITAMIN D3) 1000 UNITS CAPS Take 1,000 each by mouth daily. Yes Historical Provider, MD   aspirin EC 81 MG EC tablet Take 1 tablet by mouth daily.  4/6/12  Yes Thang Arce, DO       Future Appointments   Date Time Provider Nikunj Casanova   6/3/2019  1:00 PM Chris Da Silva MD [facility]   6/6/2019 11:40 AM MD Jey Jung [facility]   10/8/2019 10:00 AM MD Nimco Farah [facility]       Javed LEONN, RN Care Coordinator  [address] [facility] &  [facility]   [phone]

## 2019-06-03 ENCOUNTER — OFFICE VISIT (OUTPATIENT)
Dept: FAMILY MEDICINE CLINIC | Age: 69
End: 2019-06-03
Payer: COMMERCIAL

## 2019-06-03 VITALS
BODY MASS INDEX: 33.28 KG/M2 | OXYGEN SATURATION: 97 % | RESPIRATION RATE: 15 BRPM | HEART RATE: 89 BPM | SYSTOLIC BLOOD PRESSURE: 126 MMHG | DIASTOLIC BLOOD PRESSURE: 78 MMHG | WEIGHT: 200 LBS

## 2019-06-03 DIAGNOSIS — Z78.9 HISTORY OF MEASLES, MUMPS, RUBELLA (MMR) VACCINATION UNKNOWN: ICD-10-CM

## 2019-06-03 DIAGNOSIS — I10 BENIGN ESSENTIAL HTN: ICD-10-CM

## 2019-06-03 DIAGNOSIS — E78.5 HYPERLIPIDEMIA, UNSPECIFIED HYPERLIPIDEMIA TYPE: ICD-10-CM

## 2019-06-03 DIAGNOSIS — Z13.6 SCREENING FOR AAA (ABDOMINAL AORTIC ANEURYSM): ICD-10-CM

## 2019-06-03 DIAGNOSIS — E11.9 DIABETES MELLITUS WITHOUT COMPLICATION (HCC): Primary | ICD-10-CM

## 2019-06-03 PROCEDURE — G8599 NO ASA/ANTIPLAT THER USE RNG: HCPCS | Performed by: FAMILY MEDICINE

## 2019-06-03 PROCEDURE — 3045F PR MOST RECENT HEMOGLOBIN A1C LEVEL 7.0-9.0%: CPT | Performed by: FAMILY MEDICINE

## 2019-06-03 PROCEDURE — G8427 DOCREV CUR MEDS BY ELIG CLIN: HCPCS | Performed by: FAMILY MEDICINE

## 2019-06-03 PROCEDURE — 1036F TOBACCO NON-USER: CPT | Performed by: FAMILY MEDICINE

## 2019-06-03 PROCEDURE — G8417 CALC BMI ABV UP PARAM F/U: HCPCS | Performed by: FAMILY MEDICINE

## 2019-06-03 PROCEDURE — 4040F PNEUMOC VAC/ADMIN/RCVD: CPT | Performed by: FAMILY MEDICINE

## 2019-06-03 PROCEDURE — 3017F COLORECTAL CA SCREEN DOC REV: CPT | Performed by: FAMILY MEDICINE

## 2019-06-03 PROCEDURE — 99214 OFFICE O/P EST MOD 30 MIN: CPT | Performed by: FAMILY MEDICINE

## 2019-06-03 PROCEDURE — 1123F ACP DISCUSS/DSCN MKR DOCD: CPT | Performed by: FAMILY MEDICINE

## 2019-06-03 PROCEDURE — 2022F DILAT RTA XM EVC RTNOPTHY: CPT | Performed by: FAMILY MEDICINE

## 2019-06-03 NOTE — PROGRESS NOTES
Talha Kilgore is a 70 y.o. male. HPI:  Diabetes Mellitus Type II, Follow-up--   Lab Results   Component Value Date    LABA1C 8.8 03/11/2019      Checks sugars at home:Yes. fasting range: 120-140, postprandial range: 180-250. Last Eye Exam was in past year. Pt is on ACEI or ARB. Pt denies hypoglycemia , paresthesia of the feet, polydipsia, polyuria and visual disturbances. pt does not adhere to a low carb diet. He is refusing A1C today. Follows w endo- next appt in 6 weeks. Is taking lantus 40 units nightly along with humalog with meals and synjardy daily. States he is constantly afraid of hypoglycemia so frequently holds his insulin dose and checks his sugar multiple times per day. HTN--Pt seen here for follow up regarding HTN. BP checks at home:No   Pt denies blurred vision, chest pain, palpitations and peripheral edema. Pt complains of none. Tolerating medications: Yes. Pt does not exercise regularly and does not adhere to a low salt diet. Hyperlipidemia:    Lab Results   Component Value Date    LDLCALC 48 03/11/2019   . He does not have myalgias from medication. Pt is not following Lifestyle modification including Low fat/low cholesterol diet, low carbohydrate diet, and does not exercise regularly. here to discuss MMR titers- not sure if had vaccine as a child. Wants titers drawn and get booster if needed. Thinks maybe had mumps.      Current Outpatient Medications   Medication Sig Dispense Refill    Continuous Blood Gluc Sensor (FREESTYLE LA 14 DAY SENSOR) MISC ONE UNDER THE SKIN EVERY 14 DAYS 3 each 3    omeprazole (PRILOSEC) 20 MG delayed release capsule TAKE 1 CAPSULE BY MOUTH TWO TIMES A  capsule 0    Continuous Blood Gluc  (FREESTYLE LA 14 DAY READER) DORA 1 each by Does not apply route daily 1 Device 0    AGAMATRIX PRESTO TEST strip TEST BLOOD SUGAR 8 TIMES DAILY 800 each 3    SYNJARDY XR  MG TB24 TAKE ONE TABLET BY MOUTH ONE TIME A DAY 90 tablet 3    TRUEPLUS INSULIN SYRINGE 31G X 5/16\" 1 ML MISC USE DAILY 100 each 3    amLODIPine (NORVASC) 5 MG tablet TAKE ONE TABLET BY MOUTH ONE TIME A DAY 90 tablet 3    ACCU-CHEK FASTCLIX LANCETS MISC USE 8 TIMES DAILY AS DIRECTED 800 each 3    atorvastatin (LIPITOR) 20 MG tablet TAKE ONE TABLET BY MOUTH ONE TIME A DAY 90 tablet 3    insulin lispro (HUMALOG KWIKPEN) 100 UNIT/ML pen INJECT 10 UNITS SUBCUTANEOUSLY THREE TIMES A DAY ( BEFORE MEALS ) (Patient taking differently: Inject 0-10 Units into the skin 3 times daily (before meals) ) 30 mL 3    insulin glargine (LANTUS) 100 UNIT/ML injection vial Inject 50 Units into the skin every morning (before breakfast) (Patient taking differently: Inject 40 Units into the skin every morning (before breakfast) ) 5 vial 2    losartan (COZAAR) 100 MG tablet Take 1 tablet by mouth daily 90 tablet 3    Cholecalciferol (VITAMIN D3) 1000 UNITS CAPS Take 1,000 each by mouth daily.  aspirin EC 81 MG EC tablet Take 1 tablet by mouth daily. 30 tablet 11     No current facility-administered medications for this visit. Health Maintenance   Topic Date Due    AAA screen  1950    Colon cancer screen colonoscopy  06/12/2012    Shingles Vaccine (2 of 3) 12/20/2013    Diabetic retinal exam  04/07/2019    Diabetic foot exam  04/16/2019    Potassium monitoring  01/25/2020    Creatinine monitoring  01/25/2020    A1C test (Diabetic or Prediabetic)  03/11/2020    Lipid screen  03/11/2020    DTaP/Tdap/Td vaccine (2 - Td) 10/10/2022    Flu vaccine  Completed    Pneumococcal 65+ years Vaccine  Completed    Hepatitis C screen  Completed       I have reviewed the patient's medical/surgical/family/social in detail and updated the computerized patient record as appropriate.     Past Medical History:   Diagnosis Date    Asthma     childhood     CAD (coronary artery disease)     Coronary artery calcification seen on CAT scan     GERD (gastroesophageal reflux disease)     Hyperlipidemia     Hypertension     [facility]oma in [facility] ([facility]     Melanoma in situ of skin of buttock ([facility] 2016    RT abdomen    Moderate single current episode of major depressive disorder ([facility] 2017    Obesity     Osteoarthritis     Proteinuria     Salcedo Hunt syndrome     Rosacea     Stricture, esophagus     Type II or unspecified type diabetes mellitus without mention of complication, not stated as uncontrolled     Vitamin D deficiency      Past Surgical History:   Procedure Laterality Date    CHOLECYSTECTOMY      COLONOSCOPY      TOE AMPUTATION Left 1980    lawnmower accident- 2 toes    UPPER GASTROINTESTINAL ENDOSCOPY      VASECTOMY       Family History   Problem Relation Age of Onset    Cancer Father         kidney    Diabetes Father     Kidney Disease Father     Diabetes Sister     Cancer Paternal Grandfather         melanoma     Social History     Socioeconomic History    Marital status:      Spouse name: Not on file    Number of children: Not on file    Years of education: Not on file    Highest education level: Not on file   Occupational History    Occupation: retired    Social Needs    Financial resource strain: Not on file    Food insecurity:     Worry: Not on file     Inability: Not on file    Transportation needs:     Medical: Not on file     Non-medical: Not on file   Tobacco Use    Smoking status: Former Smoker     Packs/day: 2.00     Years: 15.00     Pack years: 30.00     Types: Cigarettes     Last attempt to quit:      Years since quittin.4    Smokeless tobacco: Never Used    Tobacco comment: quit    Substance and Sexual Activity    Alcohol use: Yes     Comment: rarely    Drug use: No    Sexual activity: Yes     Partners: Female   Lifestyle    Physical activity:     Days per week: Not on file     Minutes per session: Not on file    Stress: Not on file   Relationships    Social connections:     Talks on phone: Not on file     Gets together: Not on file     Attends Yazidism service: Not on file     Active member of club or organization: Not on file     Attends meetings of clubs or organizations: Not on file     Relationship status: Not on file    Intimate partner violence:     Fear of current or ex partner: Not on file     Emotionally abused: Not on file     Physically abused: Not on file     Forced sexual activity: Not on file   Other Topics Concern    Not on file   Social History Narrative    Not on file         ROS:  Gen:  Denies fever, chills, headaches. HEENT:  Denies cold symptoms, sore throat. CV:  Denies chest pain or tightness, palpitations. Pulm:  Denies shortness of breath, cough. Abd:  Denies abdominal pain, change in bowel habits. I have reviewed the patient's medical history in detail and updated the computerized patient record as appropriate. OBJECTIVE:  /78 (Site: Right Upper Arm, Position: Sitting, Cuff Size: Small Adult)   Pulse 89   Resp 15   Wt 200 lb (90.7 kg)   SpO2 97%   BMI 33.28 kg/m²   GEN:  WDWN, NAD  HEENT:  NCAT, TM/OP nl, PERRL, EOMI. NECK:  Supple without adenopathy. CV:  Regular rate and rhythm, S1 and S2 normal, no murmurs, clicks, gallops or rubs. No edema. PULM:  Chest is clear, no wheezing or rales. Normal symmetric air entry throughout both lung fields. ABD: soft, Non-tender, non-distended, normal bowel sounds. No organomegaly     ASSESSMENT/PLAN:  1. Diabetes mellitus without complication ([facility]  Uncontrolled  Refusing A1c today. Will f/u w endo in 6 weeks  -  DIABETES FOOT EXAM    2. Benign essential HTN  BP stable. On ARB    3. Hyperlipidemia, unspecified hyperlipidemia type  Continue statin/ASA    4. History of measles, mumps, rubella (MMR) vaccination unknown  - MUMPS ANTIBODY, IGG; Future  - RUBEOLA ANTIBODY IGG; Future  - RUBELLA ANTIBODY, IGG; Future    5.  Screening for AAA (abdominal aortic aneurysm)  - US SCREENING FOR AAA; Future      Discussed the importance of a low carb diet with regular cardiovascular exercise. Patient was given educational handouts regarding proper low carb/low calorie diet.

## 2019-06-10 ENCOUNTER — CARE COORDINATION (OUTPATIENT)
Dept: CARE COORDINATION | Age: 69
End: 2019-06-10

## 2019-06-12 ASSESSMENT — PATIENT HEALTH QUESTIONNAIRE - PHQ9
SUM OF ALL RESPONSES TO PHQ QUESTIONS 1-9: 1
SUM OF ALL RESPONSES TO PHQ QUESTIONS 1-9: 1

## 2019-06-12 NOTE — CARE COORDINATION
mouth daily 7/23/18  Yes Alise Duenas MD   Cholecalciferol (VITAMIN D3) 1000 UNITS CAPS Take 1,000 each by mouth daily. Yes Historical Provider, MD   aspirin EC 81 MG EC tablet Take 1 tablet by mouth daily.  4/6/12  Yes Yaquelin Cabrera, DO       Future Appointments   Date Time Provider Nikunj Casanova   7/15/2019  1:00 PM MD Amee Arroyo   10/8/2019 10:00 AM MD Jacqueline Ron [facility]       Kobe Hampton BSN, RN Care Coordinator  [facility] Medicine,  [facility] &  [facility]   [phone]

## 2019-06-13 RX ORDER — OMEPRAZOLE 20 MG/1
CAPSULE, DELAYED RELEASE ORAL
Qty: 180 CAPSULE | Refills: 1 | Status: SHIPPED | OUTPATIENT
Start: 2019-06-13 | End: 2019-12-20

## 2019-06-13 RX ORDER — LOSARTAN POTASSIUM 100 MG/1
TABLET ORAL
Qty: 90 TABLET | Refills: 3 | Status: SHIPPED | OUTPATIENT
Start: 2019-06-13 | End: 2020-06-15

## 2019-06-21 ENCOUNTER — CARE COORDINATION (OUTPATIENT)
Dept: CARE COORDINATION | Age: 69
End: 2019-06-21

## 2019-06-21 ASSESSMENT — PATIENT HEALTH QUESTIONNAIRE - PHQ9
SUM OF ALL RESPONSES TO PHQ QUESTIONS 1-9: 2
SUM OF ALL RESPONSES TO PHQ QUESTIONS 1-9: 2

## 2019-06-21 NOTE — CARE COORDINATION
Ambulatory Care Coordination Note  6/21/2019  CM Risk Score: 3  Pau Mortality Risk Score: 2.88    ACC: Brittany Verdugo, RN    Summary Note: Outreach call to pt. Pt voiced he did cut out the one piece which helped lower his post prandial glucoses! Pt stated his highest glucose over the past week was . Pt provided encouragement to have a snack with him and to eat it periodically during his outing to help prevent hypo events since he has chronic hypo fears. Will plan on f/u in 2 weeks. Diabetes Assessment    Medic Alert ID:  No  Meal Planning:  Avoidance of concentrated sweets   How often do you test your blood sugar?:  Daily, Meals, Other (Comment: test 4-6x daily, 30 minutes after meal starts)   Do you have barriers with adherence to non-pharmacologic self-management interventions?  (Nutrition/Exercise/Self-Monitoring):  Yes   Have you ever had to go to the ED for symptoms of low blood sugar?:  No       Do you have hyperglycemia symptoms?:  Yes (Comment: typcally after eating)   Frequency of Episodes:  3 Per:  Day   Do you have hypoglycemia symptoms?:  No   Last Blood Sugar Value:  189   Blood Sugar Trends:  Fluctuating (Comment: decreasing with flucuations)          Care Coordination Interventions    Program Enrollment:  Rising Risk  Referral from Primary Care Provider:  No  Suggested Interventions and Community Resources  Diabetes Education:  Completed  Disease Specific Clinic:  Completed (Comment: See Care Team)  Medication Assistance Program:  Not Started  Pharmacist:  Completed (Comment: [facility] referral)  Registered Dietician:  Completed (Comment: has seen Outpt DM Classes)  Other Services:  Completed (Comment: FreeStyle Selene CGM)  Zone Management Tools:  Completed (Comment: DM Zone management)  Other Services or Interventions:  Endo Diabetic Educator in past          Goals Addressed                 This Visit's Progress     COMPLETED:  I will take all medications as prescribed by my doctor, and Does not apply route daily 1/30/19  Yes Leonardo Rodriguez MD   AGAMATRIX PRESTO TEST strip TEST BLOOD SUGAR 8 TIMES DAILY 12/26/18  Yes Leonardo Rodriguez MD   SYNJARDY XR  MG TB24 TAKE ONE TABLET BY MOUTH ONE TIME A DAY 12/26/18  Yes Leonardo Rodriguez MD   TRUEPLUS INSULIN SYRINGE 31G X 5/16\" 1 ML MISC USE DAILY 12/26/18  Yes Leonardo Rodriguez MD   amLODIPine (NORVASC) 5 MG tablet TAKE ONE TABLET BY MOUTH ONE TIME A DAY 9/26/18  Yes Leonardo Rodriguez MD   ACCU-CHEK FASTCLIX LANCETS MISC USE 8 TIMES DAILY AS DIRECTED 9/26/18  Yes Leonardo Rodriguez MD   atorvastatin (LIPITOR) 20 MG tablet TAKE ONE TABLET BY MOUTH ONE TIME A DAY 9/26/18  Yes Leonardo Rodriguez MD   insulin lispro (HUMALOG KWIKPEN) 100 UNIT/ML pen INJECT 10 UNITS SUBCUTANEOUSLY THREE TIMES A DAY ( BEFORE MEALS )  Patient taking differently: Inject 0-10 Units into the skin 3 times daily (before meals)  9/25/18  Yes Leonardo Rodriguez MD   insulin glargine (LANTUS) 100 UNIT/ML injection vial Inject 50 Units into the skin every morning (before breakfast)  Patient taking differently: Inject 40 Units into the skin every morning (before breakfast)  9/25/18  Yes Leonardo Rodriguez MD   Cholecalciferol (VITAMIN D3) 1000 UNITS CAPS Take 1,000 each by mouth daily. Yes Historical Provider, MD   aspirin EC 81 MG EC tablet Take 1 tablet by mouth daily.  4/6/12  Yes Mandy Grace, DO       Future Appointments   Date Time Provider Nikunj Casanova   7/15/2019  1:00 PM MD Lesly Gill [facility]   10/8/2019 10:00 AM MD Case Jones [place] YESY Lynn BSN, RN Care Manager  [address] [place] [address] &  [facility]   [phone]

## 2019-07-05 ENCOUNTER — CARE COORDINATION (OUTPATIENT)
Dept: CARE COORDINATION | Age: 69
End: 2019-07-05

## 2019-07-05 NOTE — CARE COORDINATION
Outreach call attempted. Unable to reach pt or leave message due to phone continually ringing. Will plan on next outreach in next 1-2 weeks.     FU appts/Provider:    Future Appointments   Date Time Provider Nikunj Casanova   7/15/2019  1:00 PM MD Sunday De Dios Ra   10/8/2019 10:00 AM Ashly Moody MD [place]acey  [facility]       Angelito Washburn BSN, RN Care Manager  [address] [place] [address] &  [facility]   [phone]

## 2019-07-12 ENCOUNTER — CARE COORDINATION (OUTPATIENT)
Dept: CARE COORDINATION | Age: 69
End: 2019-07-12

## 2019-07-15 ENCOUNTER — OFFICE VISIT (OUTPATIENT)
Dept: ENDOCRINOLOGY | Age: 69
End: 2019-07-15
Payer: COMMERCIAL

## 2019-07-15 VITALS
SYSTOLIC BLOOD PRESSURE: 118 MMHG | WEIGHT: 207.6 LBS | HEART RATE: 83 BPM | OXYGEN SATURATION: 97 % | BODY MASS INDEX: 34.59 KG/M2 | HEIGHT: 65 IN | DIASTOLIC BLOOD PRESSURE: 66 MMHG

## 2019-07-15 DIAGNOSIS — I10 BENIGN ESSENTIAL HTN: ICD-10-CM

## 2019-07-15 DIAGNOSIS — E11.9 DIABETES MELLITUS WITHOUT COMPLICATION (HCC): Primary | ICD-10-CM

## 2019-07-15 DIAGNOSIS — E78.5 HYPERLIPIDEMIA, UNSPECIFIED HYPERLIPIDEMIA TYPE: ICD-10-CM

## 2019-07-15 DIAGNOSIS — Z78.9 HISTORY OF MEASLES, MUMPS, RUBELLA (MMR) VACCINATION UNKNOWN: ICD-10-CM

## 2019-07-15 LAB — HBA1C MFR BLD: 8.7 %

## 2019-07-15 PROCEDURE — 1123F ACP DISCUSS/DSCN MKR DOCD: CPT | Performed by: INTERNAL MEDICINE

## 2019-07-15 PROCEDURE — G8427 DOCREV CUR MEDS BY ELIG CLIN: HCPCS | Performed by: INTERNAL MEDICINE

## 2019-07-15 PROCEDURE — G8417 CALC BMI ABV UP PARAM F/U: HCPCS | Performed by: INTERNAL MEDICINE

## 2019-07-15 PROCEDURE — 2022F DILAT RTA XM EVC RTNOPTHY: CPT | Performed by: INTERNAL MEDICINE

## 2019-07-15 PROCEDURE — 4040F PNEUMOC VAC/ADMIN/RCVD: CPT | Performed by: INTERNAL MEDICINE

## 2019-07-15 PROCEDURE — 83036 HEMOGLOBIN GLYCOSYLATED A1C: CPT | Performed by: INTERNAL MEDICINE

## 2019-07-15 PROCEDURE — 3017F COLORECTAL CA SCREEN DOC REV: CPT | Performed by: INTERNAL MEDICINE

## 2019-07-15 PROCEDURE — 3045F PR MOST RECENT HEMOGLOBIN A1C LEVEL 7.0-9.0%: CPT | Performed by: INTERNAL MEDICINE

## 2019-07-15 PROCEDURE — 1036F TOBACCO NON-USER: CPT | Performed by: INTERNAL MEDICINE

## 2019-07-15 PROCEDURE — G8599 NO ASA/ANTIPLAT THER USE RNG: HCPCS | Performed by: INTERNAL MEDICINE

## 2019-07-15 PROCEDURE — 99214 OFFICE O/P EST MOD 30 MIN: CPT | Performed by: INTERNAL MEDICINE

## 2019-07-16 ENCOUNTER — CARE COORDINATION (OUTPATIENT)
Dept: CARE COORDINATION | Age: 69
End: 2019-07-16

## 2019-07-16 LAB — RUBELLA ANTIBODY IGG: >500 IU/ML

## 2019-07-16 NOTE — CARE COORDINATION
Outreach call attempted. Unable to reach pt or leave message due to phone continually ringing. Will plan to reach out to pt in 2 weeks due to ACM being out of the office between 7/22-7/26/2019.      FU appts/Provider:    Future Appointments   Date Time Provider Nikunj Casanova   10/8/2019 10:00 AM MD Tisha Bueno   10/14/2019  1:00 PM Adriano Lopez MD [facility] BSN, RN Care Manager  [address] [place] [address] &  [facility]   [phone]

## 2019-07-17 LAB
MUV IGG SER QL: 7.6 AU/ML
RUBEOLA (MEASLES) AB IGG: 214 AU/ML

## 2019-08-01 ENCOUNTER — CARE COORDINATION (OUTPATIENT)
Dept: CARE COORDINATION | Age: 69
End: 2019-08-01

## 2019-08-27 ENCOUNTER — CARE COORDINATION (OUTPATIENT)
Dept: CARE COORDINATION | Age: 69
End: 2019-08-27

## 2019-08-27 SDOH — SOCIAL STABILITY: SOCIAL NETWORK: HOW OFTEN DO YOU ATTENT MEETINGS OF THE CLUB OR ORGANIZATION YOU BELONG TO?: NEVER

## 2019-08-27 SDOH — HEALTH STABILITY: PHYSICAL HEALTH: ON AVERAGE, HOW MANY MINUTES DO YOU ENGAGE IN EXERCISE AT THIS LEVEL?: 0 MIN

## 2019-08-27 SDOH — HEALTH STABILITY: PHYSICAL HEALTH: ON AVERAGE, HOW MANY DAYS PER WEEK DO YOU ENGAGE IN MODERATE TO STRENUOUS EXERCISE (LIKE A BRISK WALK)?: 0 DAYS

## 2019-08-27 SDOH — SOCIAL STABILITY: SOCIAL NETWORK: ARE YOU MARRIED, WIDOWED, DIVORCED, SEPARATED, NEVER MARRIED, OR LIVING WITH A PARTNER?: MARRIED

## 2019-08-27 SDOH — SOCIAL STABILITY: SOCIAL NETWORK: HOW OFTEN DO YOU ATTEND CHURCH OR RELIGIOUS SERVICES?: NEVER

## 2019-08-27 SDOH — SOCIAL STABILITY: SOCIAL NETWORK: IN A TYPICAL WEEK, HOW MANY TIMES DO YOU TALK ON THE PHONE WITH FAMILY, FRIENDS, OR NEIGHBORS?: THREE TIMES A WEEK

## 2019-08-27 SDOH — HEALTH STABILITY: MENTAL HEALTH
STRESS IS WHEN SOMEONE FEELS TENSE, NERVOUS, ANXIOUS, OR CAN'T SLEEP AT NIGHT BECAUSE THEIR MIND IS TROUBLED. HOW STRESSED ARE YOU?: TO SOME EXTENT

## 2019-08-27 SDOH — ECONOMIC STABILITY: INCOME INSECURITY: HOW HARD IS IT FOR YOU TO PAY FOR THE VERY BASICS LIKE FOOD, HOUSING, MEDICAL CARE, AND HEATING?: NOT VERY HARD

## 2019-08-27 SDOH — ECONOMIC STABILITY: TRANSPORTATION INSECURITY
IN THE PAST 12 MONTHS, HAS LACK OF TRANSPORTATION KEPT YOU FROM MEETINGS, WORK, OR FROM GETTING THINGS NEEDED FOR DAILY LIVING?: NO

## 2019-08-27 SDOH — ECONOMIC STABILITY: TRANSPORTATION INSECURITY
IN THE PAST 12 MONTHS, HAS THE LACK OF TRANSPORTATION KEPT YOU FROM MEDICAL APPOINTMENTS OR FROM GETTING MEDICATIONS?: NO

## 2019-08-27 SDOH — ECONOMIC STABILITY: FOOD INSECURITY: WITHIN THE PAST 12 MONTHS, THE FOOD YOU BOUGHT JUST DIDN'T LAST AND YOU DIDN'T HAVE MONEY TO GET MORE.: NEVER TRUE

## 2019-08-27 SDOH — ECONOMIC STABILITY: FOOD INSECURITY: WITHIN THE PAST 12 MONTHS, YOU WORRIED THAT YOUR FOOD WOULD RUN OUT BEFORE YOU GOT MONEY TO BUY MORE.: NEVER TRUE

## 2019-08-27 SDOH — HEALTH STABILITY: MENTAL HEALTH: HOW OFTEN DO YOU HAVE A DRINK CONTAINING ALCOHOL?: MONTHLY OR LESS

## 2019-08-27 SDOH — SOCIAL STABILITY: SOCIAL NETWORK: HOW OFTEN DO YOU GET TOGETHER WITH FRIENDS OR RELATIVES?: THREE TIMES A WEEK

## 2019-08-27 SDOH — SOCIAL STABILITY: SOCIAL NETWORK
DO YOU BELONG TO ANY CLUBS OR ORGANIZATIONS SUCH AS CHURCH GROUPS UNIONS, FRATERNAL OR ATHLETIC GROUPS, OR SCHOOL GROUPS?: NO

## 2019-08-27 ASSESSMENT — PATIENT HEALTH QUESTIONNAIRE - PHQ9
SUM OF ALL RESPONSES TO PHQ QUESTIONS 1-9: 0
SUM OF ALL RESPONSES TO PHQ QUESTIONS 1-9: 0

## 2019-08-27 NOTE — CARE COORDINATION
Ambulatory Care Coordination Note  8/27/2019  CM Risk Score: 3  Charlson 10 Year Mortality Risk Score: 79%     ACC: Gabby Walls RN    Summary Note: Outreach call to pt. Pt voiced he is starting to be in better control related to his glucoses. Pt voiced he still is testing his glucose 3x daily with meals and he has noticed his CGM readings are consistently 40 off (higher than what his CGM readings) Pt voiced today when he woke up it said he was 73 and he tested and he was 113. Pt said he had a great f/u appt with Endo and is more comfortable related to his insulin and his overall care plane. Pt would like for ACM to call him on a monthly basis to touch base, however he will call her if he has any questions or concerns related to his DM. Will plan next call in a week to touch base and then monthly after that. Pt voiced understanding and appreciation. Diabetes Assessment    Medic Alert ID:  No  Meal Planning:  Avoidance of concentrated sweets   How often do you test your blood sugar?:  Daily, Meals, Other (Comment: test 4-6x daily, 30 minutes after meal starts)   Do you have barriers with adherence to non-pharmacologic self-management interventions?  (Nutrition/Exercise/Self-Monitoring):  Yes   Have you ever had to go to the ED for symptoms of low blood sugar?:  No       Increase or Decrease trend in Blood Sugars   Do you have hyperglycemia symptoms?:  Yes (Comment: once a day is on average >200)   Frequency of Episodes:  1 Per:  Day   Do you have hypoglycemia symptoms?:   (Comment: per CGM yes- will test and his readings are 30-40 higher)   Last Blood Sugar Value:  144   Blood Sugar Trends:  Fluctuating (Comment: notices flucuations between meter/CGM)          Ambulatory Care Coordination Assessment    Care Coordination Protocol  Program Enrollment:  Rising Risk  Referral from Primary Care Provider:  Yes  Week 1 - Initial Assessment     Do you have all of your prescriptions and are they filled?:  Yes  Barriers Take 1 tablet by mouth daily.  4/6/12  Yes Anna Becker, DO       Future Appointments   Date Time Provider Nikunj Casanova   10/8/2019 10:00 AM Ashly Moody MD [place]  [facility]   10/14/2019  1:00 PM Adriano Lopez MD [facility] BSN, RN Care Manager  [address] [place] [address] &  [facility]   [phone]

## 2019-09-03 ENCOUNTER — CARE COORDINATION (OUTPATIENT)
Dept: CARE COORDINATION | Age: 69
End: 2019-09-03

## 2019-09-03 NOTE — CARE COORDINATION
Ambulatory Care Coordination Note  9/3/2019  CM Risk Score: 3  Charlson 10 Year Mortality Risk Score: 79%     ACC: Sylvia Kumar, RN    Summary Note: Outreach call to pt. Pt voiced he would like to know who to call regarding his FreeStyle sensor readings being off. Pt voiced, \"my sugar is high right now but my sensor is reading 218 and I checked my readings with my glucometer and its reading 280- which is going to make me need more insulin with my SS. \" ACM encouraged pt call [address] [phone] and ask about replacement sensors due to this being a frequent issue for him. Pt voiced he will call and he is aware ACM will f/u with him next week.       Care Coordination Interventions    Program Enrollment:  Rising Risk  Referral from Primary Care Provider:  Yes  Suggested Interventions and Community Resources  Diabetes Education:  Completed  Fall Risk Prevention:  Declined  Disease Specific Clinic:  Completed (Comment: See Care Team)  Meals on Wheels:  Declined  Medication Assistance Program:  Completed (Comment: DM Program ([facility]))  Registered Dietician:  Completed (Comment: Eber GRAY)  Senior Services:  Declined  Social Work:  Declined  Transportation Support:  Declined  Zone Management Tools:  Completed (Comment: DM Zone Program )         Future Appointments   Date Time Provider Nikunj Casanova   10/8/2019 10:00 AM MD Kaitlin Butts   10/14/2019  1:00 PM MD Odalis Cole BSN, RN Care Manager  [facility],  [address] [place] [address] Primary Care   [phone]

## 2019-09-05 ENCOUNTER — CARE COORDINATION (OUTPATIENT)
Dept: CARE COORDINATION | Age: 69
End: 2019-09-05

## 2019-09-11 ENCOUNTER — CARE COORDINATION (OUTPATIENT)
Dept: CARE COORDINATION | Age: 69
End: 2019-09-11

## 2019-09-11 ASSESSMENT — PATIENT HEALTH QUESTIONNAIRE - PHQ9
SUM OF ALL RESPONSES TO PHQ QUESTIONS 1-9: 0
SUM OF ALL RESPONSES TO PHQ QUESTIONS 1-9: 0

## 2019-09-17 RX ORDER — PEN NEEDLE, DIABETIC 31 GX5/16"
NEEDLE, DISPOSABLE MISCELLANEOUS
Qty: 120 EACH | Refills: 5 | Status: SHIPPED | OUTPATIENT
Start: 2019-09-17 | End: 2020-09-15

## 2019-09-17 RX ORDER — ATORVASTATIN CALCIUM 20 MG/1
TABLET, FILM COATED ORAL
Qty: 90 TABLET | Refills: 3 | Status: SHIPPED | OUTPATIENT
Start: 2019-09-17 | End: 2020-09-15

## 2019-09-17 RX ORDER — LANCETS
EACH MISCELLANEOUS
Qty: 240 EACH | Refills: 5 | Status: SHIPPED | OUTPATIENT
Start: 2019-09-17 | End: 2021-06-21

## 2019-09-17 RX ORDER — AMLODIPINE BESYLATE 5 MG/1
TABLET ORAL
Qty: 90 TABLET | Refills: 3 | Status: SHIPPED | OUTPATIENT
Start: 2019-09-17 | End: 2020-09-15

## 2019-09-24 ENCOUNTER — CARE COORDINATION (OUTPATIENT)
Dept: CARE COORDINATION | Age: 69
End: 2019-09-24

## 2019-09-25 ASSESSMENT — PATIENT HEALTH QUESTIONNAIRE - PHQ9
SUM OF ALL RESPONSES TO PHQ QUESTIONS 1-9: 0
SUM OF ALL RESPONSES TO PHQ QUESTIONS 1-9: 0

## 2019-10-08 ENCOUNTER — OFFICE VISIT (OUTPATIENT)
Dept: DERMATOLOGY | Age: 69
End: 2019-10-08
Payer: COMMERCIAL

## 2019-10-08 DIAGNOSIS — L82.1 SEBORRHEIC KERATOSES: ICD-10-CM

## 2019-10-08 DIAGNOSIS — Z12.83 SCREENING EXAM FOR SKIN CANCER: ICD-10-CM

## 2019-10-08 DIAGNOSIS — Z86.006 HISTORY OF MELANOMA IN SITU: ICD-10-CM

## 2019-10-08 DIAGNOSIS — L57.0 ACTINIC KERATOSES: Primary | ICD-10-CM

## 2019-10-08 PROCEDURE — G8417 CALC BMI ABV UP PARAM F/U: HCPCS | Performed by: DERMATOLOGY

## 2019-10-08 PROCEDURE — 17003 DESTRUCT PREMALG LES 2-14: CPT | Performed by: DERMATOLOGY

## 2019-10-08 PROCEDURE — 1036F TOBACCO NON-USER: CPT | Performed by: DERMATOLOGY

## 2019-10-08 PROCEDURE — G8484 FLU IMMUNIZE NO ADMIN: HCPCS | Performed by: DERMATOLOGY

## 2019-10-08 PROCEDURE — G8599 NO ASA/ANTIPLAT THER USE RNG: HCPCS | Performed by: DERMATOLOGY

## 2019-10-08 PROCEDURE — 4040F PNEUMOC VAC/ADMIN/RCVD: CPT | Performed by: DERMATOLOGY

## 2019-10-08 PROCEDURE — 3017F COLORECTAL CA SCREEN DOC REV: CPT | Performed by: DERMATOLOGY

## 2019-10-08 PROCEDURE — 17000 DESTRUCT PREMALG LESION: CPT | Performed by: DERMATOLOGY

## 2019-10-08 PROCEDURE — 99213 OFFICE O/P EST LOW 20 MIN: CPT | Performed by: DERMATOLOGY

## 2019-10-08 PROCEDURE — G8427 DOCREV CUR MEDS BY ELIG CLIN: HCPCS | Performed by: DERMATOLOGY

## 2019-10-08 PROCEDURE — 1123F ACP DISCUSS/DSCN MKR DOCD: CPT | Performed by: DERMATOLOGY

## 2019-10-11 RX ORDER — FLASH GLUCOSE SENSOR
KIT MISCELLANEOUS
Qty: 1 EACH | Refills: 3 | Status: SHIPPED | OUTPATIENT
Start: 2019-10-11 | End: 2019-12-20

## 2019-10-14 ENCOUNTER — OFFICE VISIT (OUTPATIENT)
Dept: ENDOCRINOLOGY | Age: 69
End: 2019-10-14
Payer: COMMERCIAL

## 2019-10-14 VITALS
HEART RATE: 77 BPM | HEIGHT: 65 IN | OXYGEN SATURATION: 95 % | SYSTOLIC BLOOD PRESSURE: 117 MMHG | BODY MASS INDEX: 34.85 KG/M2 | RESPIRATION RATE: 18 BRPM | WEIGHT: 209.2 LBS | DIASTOLIC BLOOD PRESSURE: 64 MMHG

## 2019-10-14 DIAGNOSIS — I10 BENIGN ESSENTIAL HTN: ICD-10-CM

## 2019-10-14 DIAGNOSIS — E78.5 HYPERLIPIDEMIA, UNSPECIFIED HYPERLIPIDEMIA TYPE: ICD-10-CM

## 2019-10-14 DIAGNOSIS — E11.9 DIABETES MELLITUS WITHOUT COMPLICATION (HCC): Primary | ICD-10-CM

## 2019-10-14 LAB — HBA1C MFR BLD: 8 %

## 2019-10-14 PROCEDURE — 4040F PNEUMOC VAC/ADMIN/RCVD: CPT | Performed by: INTERNAL MEDICINE

## 2019-10-14 PROCEDURE — 2022F DILAT RTA XM EVC RTNOPTHY: CPT | Performed by: INTERNAL MEDICINE

## 2019-10-14 PROCEDURE — G8484 FLU IMMUNIZE NO ADMIN: HCPCS | Performed by: INTERNAL MEDICINE

## 2019-10-14 PROCEDURE — 1123F ACP DISCUSS/DSCN MKR DOCD: CPT | Performed by: INTERNAL MEDICINE

## 2019-10-14 PROCEDURE — G8427 DOCREV CUR MEDS BY ELIG CLIN: HCPCS | Performed by: INTERNAL MEDICINE

## 2019-10-14 PROCEDURE — 83036 HEMOGLOBIN GLYCOSYLATED A1C: CPT | Performed by: INTERNAL MEDICINE

## 2019-10-14 PROCEDURE — 99214 OFFICE O/P EST MOD 30 MIN: CPT | Performed by: INTERNAL MEDICINE

## 2019-10-14 PROCEDURE — G8417 CALC BMI ABV UP PARAM F/U: HCPCS | Performed by: INTERNAL MEDICINE

## 2019-10-14 PROCEDURE — 3017F COLORECTAL CA SCREEN DOC REV: CPT | Performed by: INTERNAL MEDICINE

## 2019-10-14 PROCEDURE — G8599 NO ASA/ANTIPLAT THER USE RNG: HCPCS | Performed by: INTERNAL MEDICINE

## 2019-10-14 PROCEDURE — 1036F TOBACCO NON-USER: CPT | Performed by: INTERNAL MEDICINE

## 2019-10-17 ENCOUNTER — NURSE ONLY (OUTPATIENT)
Dept: FAMILY MEDICINE CLINIC | Age: 69
End: 2019-10-17
Payer: COMMERCIAL

## 2019-10-17 ENCOUNTER — CARE COORDINATION (OUTPATIENT)
Dept: CARE COORDINATION | Age: 69
End: 2019-10-17

## 2019-10-17 DIAGNOSIS — Z23 NEED FOR VACCINATION: Primary | ICD-10-CM

## 2019-10-17 PROCEDURE — 90653 IIV ADJUVANT VACCINE IM: CPT | Performed by: FAMILY MEDICINE

## 2019-10-17 PROCEDURE — 90471 IMMUNIZATION ADMIN: CPT | Performed by: FAMILY MEDICINE

## 2019-10-17 ASSESSMENT — PATIENT HEALTH QUESTIONNAIRE - PHQ9
SUM OF ALL RESPONSES TO PHQ QUESTIONS 1-9: 0
SUM OF ALL RESPONSES TO PHQ QUESTIONS 1-9: 0

## 2019-11-11 ENCOUNTER — CARE COORDINATION (OUTPATIENT)
Dept: CARE COORDINATION | Age: 69
End: 2019-11-11

## 2019-11-11 ASSESSMENT — PATIENT HEALTH QUESTIONNAIRE - PHQ9
SUM OF ALL RESPONSES TO PHQ QUESTIONS 1-9: 0
SUM OF ALL RESPONSES TO PHQ QUESTIONS 1-9: 0

## 2019-11-12 ENCOUNTER — TELEPHONE (OUTPATIENT)
Dept: FAMILY MEDICINE CLINIC | Age: 69
End: 2019-11-12

## 2019-12-17 ENCOUNTER — CARE COORDINATION (OUTPATIENT)
Dept: CARE COORDINATION | Age: 69
End: 2019-12-17

## 2019-12-17 ASSESSMENT — PATIENT HEALTH QUESTIONNAIRE - PHQ9
SUM OF ALL RESPONSES TO PHQ QUESTIONS 1-9: 0
SUM OF ALL RESPONSES TO PHQ QUESTIONS 1-9: 0

## 2019-12-20 RX ORDER — EMPAGLIFLOZIN, METFORMIN HYDROCHLORIDE 10; 1000 MG/1; MG/1
TABLET, EXTENDED RELEASE ORAL
Qty: 90 TABLET | Refills: 3 | Status: SHIPPED | OUTPATIENT
Start: 2019-12-20 | End: 2020-02-11

## 2019-12-20 RX ORDER — OMEPRAZOLE 20 MG/1
CAPSULE, DELAYED RELEASE ORAL
Qty: 180 CAPSULE | Refills: 1 | Status: SHIPPED | OUTPATIENT
Start: 2019-12-20 | End: 2020-06-15

## 2019-12-20 RX ORDER — SYRINGE AND NEEDLE,INSULIN,1ML 31 GX5/16"
SYRINGE, EMPTY DISPOSABLE MISCELLANEOUS
Qty: 100 EACH | Refills: 3 | Status: SHIPPED | OUTPATIENT
Start: 2019-12-20 | End: 2021-03-18

## 2019-12-20 RX ORDER — INSULIN GLARGINE 100 [IU]/ML
INJECTION, SOLUTION SUBCUTANEOUS
Qty: 50 ML | Refills: 2 | Status: SHIPPED | OUTPATIENT
Start: 2019-12-20 | End: 2020-10-26 | Stop reason: SDUPTHER

## 2019-12-20 RX ORDER — FLASH GLUCOSE SENSOR
KIT MISCELLANEOUS
Qty: 2 EACH | Refills: 5 | Status: SHIPPED | OUTPATIENT
Start: 2019-12-20 | End: 2020-06-15

## 2019-12-24 ENCOUNTER — CARE COORDINATION (OUTPATIENT)
Dept: CARE COORDINATION | Age: 69
End: 2019-12-24

## 2020-01-02 ENCOUNTER — CARE COORDINATION (OUTPATIENT)
Dept: CARE COORDINATION | Age: 70
End: 2020-01-02

## 2020-01-02 ASSESSMENT — PATIENT HEALTH QUESTIONNAIRE - PHQ9
SUM OF ALL RESPONSES TO PHQ QUESTIONS 1-9: 0
SUM OF ALL RESPONSES TO PHQ QUESTIONS 1-9: 0

## 2020-01-02 NOTE — CARE COORDINATION
Completed (Comment: DM Zone Program )         Goals Addressed                 This Visit's Progress     Conditions and Symptoms   On track     I will notify my provider of any barriers to my plan of care. Barriers: overwhelmed by complexity of regimen and stress  Plan for overcoming my barriers: I will call my ACM and or my providers with any questions or concerns related to my health  Confidence: 10/10  Anticipated Goal Completion Date: 1/11/2020  - Pt hasn't called Customer Service at [facility] to request additional Sensors and to discuss concerns about how they are reading to clarify if he is doing something incorrect etc due to his numbers being up to 40-60 points off. Pt voiced he plans on doing that in the near future. 9/11/2019   - Pt called Customer Service and has been sent 1 sensor due to skewed readings. Pt praised for reaching out and letting them know about reading issues. 9/25/2019  - Pt came to [facility] to get his Flu vaccine. A1c trending down! Pt is doing well. 10/17/2019  - Pt provided with LF office number to call in the future for his appts. 11/11/2019  - Pt will call for appt to see PCP before end of year tomorrow. 12/17/2019  - Pt is checking with his glucometer once a day and monitoring with his FreeStyle hourly when awake. Pt feels more confident having his CGM. 1/2/2019            Prior to Admission medications    Medication Sig Start Date End Date Taking?  Authorizing Provider   omeprazole (PRILOSEC) 20 MG delayed release capsule TAKE 1 CAPSULE BY MOUTH 2 TIMES A DAY 12/20/19  Yes Ildefonso Bob MD   SYNJARDY XR  MG TB24 TAKE 1 TABLET BY MOUTH ONE TIME A DAY 12/20/19  Yes Ildefonso Bob MD   LANTUS 100 UNIT/ML injection vial INJECT 50 UNITS UNDER THE SKIN IN THE MORNING BEFORE BREAKFAST 12/20/19  Yes Ildefonso Bob MD   TRUEPLUS INSULIN SYRINGE 31G X 5/16\" 1 ML MISC USE DAILY 12/20/19  Yes Ildefonso Bob MD   Continuous Blood Gluc Sensor (FREESTYLE LA 14 DAY SENSOR) MISC

## 2020-01-08 ENCOUNTER — OFFICE VISIT (OUTPATIENT)
Dept: FAMILY MEDICINE CLINIC | Age: 70
End: 2020-01-08
Payer: COMMERCIAL

## 2020-01-08 VITALS
HEART RATE: 82 BPM | WEIGHT: 214 LBS | OXYGEN SATURATION: 95 % | BODY MASS INDEX: 35.61 KG/M2 | DIASTOLIC BLOOD PRESSURE: 60 MMHG | SYSTOLIC BLOOD PRESSURE: 118 MMHG

## 2020-01-08 LAB — HBA1C MFR BLD: 8.3 %

## 2020-01-08 PROCEDURE — 1036F TOBACCO NON-USER: CPT | Performed by: FAMILY MEDICINE

## 2020-01-08 PROCEDURE — G8417 CALC BMI ABV UP PARAM F/U: HCPCS | Performed by: FAMILY MEDICINE

## 2020-01-08 PROCEDURE — G8482 FLU IMMUNIZE ORDER/ADMIN: HCPCS | Performed by: FAMILY MEDICINE

## 2020-01-08 PROCEDURE — 83036 HEMOGLOBIN GLYCOSYLATED A1C: CPT | Performed by: FAMILY MEDICINE

## 2020-01-08 PROCEDURE — 4040F PNEUMOC VAC/ADMIN/RCVD: CPT | Performed by: FAMILY MEDICINE

## 2020-01-08 PROCEDURE — G8427 DOCREV CUR MEDS BY ELIG CLIN: HCPCS | Performed by: FAMILY MEDICINE

## 2020-01-08 PROCEDURE — 1123F ACP DISCUSS/DSCN MKR DOCD: CPT | Performed by: FAMILY MEDICINE

## 2020-01-08 PROCEDURE — 99214 OFFICE O/P EST MOD 30 MIN: CPT | Performed by: FAMILY MEDICINE

## 2020-01-08 PROCEDURE — 3017F COLORECTAL CA SCREEN DOC REV: CPT | Performed by: FAMILY MEDICINE

## 2020-01-08 PROCEDURE — 2022F DILAT RTA XM EVC RTNOPTHY: CPT | Performed by: FAMILY MEDICINE

## 2020-01-08 NOTE — PROGRESS NOTES
Stricture, esophagus     Type II or unspecified type diabetes mellitus without mention of complication, not stated as uncontrolled     Vitamin D deficiency      Past Surgical History:   Procedure Laterality Date    CHOLECYSTECTOMY      COLONOSCOPY  2002    TOE AMPUTATION Left 1980    lawnmower accident- 2 toes    UPPER GASTROINTESTINAL ENDOSCOPY  2002    VASECTOMY       Family History   Problem Relation Age of Onset    Cancer Father         kidney    Diabetes Father     Kidney Disease Father     Diabetes Sister     Cancer Paternal Grandfather         melanoma     Social History     Socioeconomic History    Marital status:      Spouse name: Not on file    Number of children: Not on file    Years of education: Not on file    Highest education level: Not on file   Occupational History    Occupation: retired    Social Needs    Financial resource strain: Not very hard    Food insecurity:     Worry: Never true     Inability: Never true    Transportation needs:     Medical: No     Non-medical: No   Tobacco Use    Smoking status: Former Smoker     Packs/day: 2.00     Years: 15.00     Pack years: 30.00     Types: Cigarettes     Last attempt to quit:      Years since quittin.0    Smokeless tobacco: Never Used    Tobacco comment: quit    Substance and Sexual Activity    Alcohol use: Yes     Frequency: Monthly or less     Binge frequency: Never     Comment: rarely    Drug use: No    Sexual activity: Yes     Partners: Female   Lifestyle    Physical activity:     Days per week: 0 days     Minutes per session: 0 min    Stress: To some extent   Relationships    Social connections:     Talks on phone: Three times a week     Gets together:  Three times a week     Attends [employer] service: Never     Active member of club or organization: No     Attends meetings of clubs or organizations: Never     Relationship status:     Intimate partner violence:     Fear of current or ex partner: Not on file     Emotionally abused: Not on file     Physically abused: Not on file     Forced sexual activity: Not on file   Other Topics Concern    Not on file   Social History Narrative    Using [employer] and test to ensure he is accurate for his dosing          ROS:  Gen:  Denies fever, chills, headaches. HEENT:  Denies cold symptoms, sore throat. CV:  Denies chest pain or tightness, palpitations. Pulm:  Denies shortness of breath, cough. Abd:  Denies abdominal pain, change in bowel habits. I have reviewed the patient's medical history in detail and updated the computerized patient record as appropriate. OBJECTIVE:  /60 (Site: Right Upper Arm, Position: Sitting, Cuff Size: Large Adult)   Pulse 82   Wt 214 lb (97.1 kg)   SpO2 95%   BMI 35.61 kg/m²   GEN:  WDWN, NAD, obese  HEENT:  NCAT, TM/OP nl, PERRL, EOMI. NECK:  Supple without adenopathy. CV:  Regular rate and rhythm, S1 and S2 normal, no murmurs, clicks, gallops or rubs. No edema. PULM:  Chest is clear, no wheezing or rales. Normal symmetric air entry throughout both lung fields. ABD: soft, Non-tender, non-distended, normal bowel sounds. No organomegaly     ASSESSMENT/PLAN:  1. Diabetes mellitus without complication ([address]  Uncontrolled  Pt declines any change in his regimen at this time. Will see endo next month  Discussed the importance of a low carb diet with regular cardiovascular exercise. Pt is not compliant w diet  - POCT glycosylated hemoglobin (Hb A1C)    2. Benign essential HTN  BP stable. Labs as ordered per endo    3. Hyperlipidemia, unspecified hyperlipidemia type  Continue statin.  Labs as ordered per endo

## 2020-01-14 ENCOUNTER — TELEPHONE (OUTPATIENT)
Dept: PHARMACY | Facility: CLINIC | Age: 70
End: 2020-01-14

## 2020-01-14 NOTE — TELEPHONE ENCOUNTER
Called patient to schedule pharmacist appointment to discuss medications for Diabetes Management Program.       Katy Cotto CPhT.   Pharmacy [facility]  Department, toll free: [phone], option 7

## 2020-01-16 ENCOUNTER — SCHEDULED TELEPHONE ENCOUNTER (OUTPATIENT)
Dept: PHARMACY | Facility: CLINIC | Age: 70
End: 2020-01-16

## 2020-01-16 NOTE — TELEPHONE ENCOUNTER
10/11/2010, 2011, 10/03/2012    Influenza, High Dose (Fluzone 65 yrs and older) 2015, 10/24/2016, 10/24/2017, 10/17/2018    Influenza, Triv, inactivated, subunit, adjuvanted, IM (Fluad 65 yrs and older) 10/17/2019    Pneumococcal Conjugate 13-valent ([id number]) 2015    Pneumococcal Conjugate 7-valent (Prevnar7) 2002    Pneumococcal Polysaccharide ([id number]) 2010, 2017    Tdap (Boostrix, Adacel) 10/10/2012    Zoster Live (Zostavax) 10/25/2013      Social History:  Social History     Tobacco Use    Smoking status: Former Smoker     Packs/day: 2.00     Years: 15.00     Pack years: 30.00     Types: Cigarettes     Last attempt to quit:      Years since quittin.0    Smokeless tobacco: Never Used    Tobacco comment: quit    Substance Use Topics    Alcohol use: Yes     Frequency: Monthly or less     Binge frequency: Never     Comment: rarely     ASSESSMENT:  Initial Program Requirements (Y indicates has completed for the year, N indicates needs to be completed by 20): Yes - OV with provider for DM (1st) 20  Yes - A1c (1st) 20     Ongoing Program Requirements (Y indicates has completed for the year, N indicates needs to be completed by 20): No - OV with provider for DM (2nd)  No - ACC/diabetes educator visit (if A1c over 8%) - works with Carole Tapia RN  No - A1c (2nd)  No - Lipid panel  No - Urine microalbumin  Yes - Pneumococcal vaccination: [id number] received 2017  No - Flu vaccine for 5726-0209 season  Yes - Medication adherence over 70%  Yes - On statin or contraindication(s) Atorvastatin (LF 19 for 90ds with 2 RR)  Yes - On ACEi/ARB or contraindication(s) Losartan (LF 19 for 90ds with 1 RR)     Formulary Medication Review:  Non-formulary or medications with cost-effective alternatives: N/A.      Current medications eligible for copay waiver, up to $600, through [facility]:  - Aspirin (with prescription), MD Remington Gallardo [facility]   10/13/2020 10:00 AM MD ESTEFANIA Calle [facility] Derm [facility]     Baxter , [address] [place] [address], PharmD  [address] CLINICAL PHARMACY   Phone: [phone]    CLINICAL PHARMACY CONSULT: MED RECONCILIATION/REVIEW ADDENDUM    For Pharmacy Admin Tracking Only    PHSO: Yes  Total # of Interventions Recommended: 2  - Increased Dose #: 1  - Updated Order #: 1 Updated Order Reason(s):  Other  Recommended intervention potential cost savings: 1  Total Interventions Accepted: 2  Time Spent (min): Paxton Lopez, PharmD  [address]

## 2020-01-20 NOTE — TELEPHONE ENCOUNTER
Noted - thank you for your quick response! Will send request in separate encounter.      Gloria Woodard, PharmD, [place] [address] Pharmacist  [phone] or [phone] (Option 7)

## 2020-01-27 NOTE — TELEPHONE ENCOUNTER
Synjardy XR  mg (1 tablet daily) was sent via [facility] on 1/27/20. Please call patient to make sure he is aware that his endocrinologist (Dr. Humera Carter) wanted to increase this dose (from Synjardy XR  mg tablet daily). Please advise patient to stop the lower dose and to replace with the higher dose once he receives in the mail.     Thank you,    Hood Stone, PharmD, [place] [address] Pharmacist  [phone] or [phone] (Option 7)

## 2020-02-10 DIAGNOSIS — I10 BENIGN ESSENTIAL HTN: ICD-10-CM

## 2020-02-10 DIAGNOSIS — E11.9 DIABETES MELLITUS WITHOUT COMPLICATION (HCC): ICD-10-CM

## 2020-02-10 DIAGNOSIS — E78.5 HYPERLIPIDEMIA, UNSPECIFIED HYPERLIPIDEMIA TYPE: ICD-10-CM

## 2020-02-10 LAB
A/G RATIO: 1.8 (ref 1.1–2.2)
ALBUMIN SERPL-MCNC: 4.2 G/DL (ref 3.4–5)
ALP BLD-CCNC: 62 U/L (ref 40–129)
ALT SERPL-CCNC: 13 U/L (ref 10–40)
ANION GAP SERPL CALCULATED.3IONS-SCNC: 11 MMOL/L (ref 3–16)
AST SERPL-CCNC: 14 U/L (ref 15–37)
BILIRUB SERPL-MCNC: 0.4 MG/DL (ref 0–1)
BUN BLDV-MCNC: 14 MG/DL (ref 7–20)
CALCIUM SERPL-MCNC: 9.3 MG/DL (ref 8.3–10.6)
CHLORIDE BLD-SCNC: 103 MMOL/L (ref 99–110)
CHOLESTEROL, TOTAL: 118 MG/DL (ref 0–199)
CO2: 29 MMOL/L (ref 21–32)
CREAT SERPL-MCNC: 0.9 MG/DL (ref 0.8–1.3)
CREATININE URINE: 84.2 MG/DL (ref 39–259)
GFR AFRICAN AMERICAN: >60
GFR NON-AFRICAN AMERICAN: >60
GLOBULIN: 2.4 G/DL
GLUCOSE BLD-MCNC: 136 MG/DL (ref 70–99)
HDLC SERPL-MCNC: 39 MG/DL (ref 40–60)
LDL CHOLESTEROL CALCULATED: 62 MG/DL
MICROALBUMIN UR-MCNC: 6.5 MG/DL
MICROALBUMIN/CREAT UR-RTO: 77.2 MG/G (ref 0–30)
POTASSIUM SERPL-SCNC: 4.7 MMOL/L (ref 3.5–5.1)
SODIUM BLD-SCNC: 143 MMOL/L (ref 136–145)
TOTAL PROTEIN: 6.6 G/DL (ref 6.4–8.2)
TRIGL SERPL-MCNC: 83 MG/DL (ref 0–150)
VLDLC SERPL CALC-MCNC: 17 MG/DL

## 2020-02-11 ENCOUNTER — OFFICE VISIT (OUTPATIENT)
Dept: ENDOCRINOLOGY | Age: 70
End: 2020-02-11
Payer: COMMERCIAL

## 2020-02-11 VITALS
DIASTOLIC BLOOD PRESSURE: 65 MMHG | SYSTOLIC BLOOD PRESSURE: 129 MMHG | HEIGHT: 65 IN | WEIGHT: 216.4 LBS | BODY MASS INDEX: 36.06 KG/M2

## 2020-02-11 LAB
ESTIMATED AVERAGE GLUCOSE: 182.9 MG/DL
HBA1C MFR BLD: 8 %

## 2020-02-11 PROCEDURE — 99214 OFFICE O/P EST MOD 30 MIN: CPT | Performed by: INTERNAL MEDICINE

## 2020-02-11 PROCEDURE — 95251 CONT GLUC MNTR ANALYSIS I&R: CPT | Performed by: INTERNAL MEDICINE

## 2020-02-11 PROCEDURE — 3052F HG A1C>EQUAL 8.0%<EQUAL 9.0%: CPT | Performed by: INTERNAL MEDICINE

## 2020-02-11 PROCEDURE — 1123F ACP DISCUSS/DSCN MKR DOCD: CPT | Performed by: INTERNAL MEDICINE

## 2020-02-11 PROCEDURE — G8417 CALC BMI ABV UP PARAM F/U: HCPCS | Performed by: INTERNAL MEDICINE

## 2020-02-11 PROCEDURE — 3017F COLORECTAL CA SCREEN DOC REV: CPT | Performed by: INTERNAL MEDICINE

## 2020-02-11 PROCEDURE — G8427 DOCREV CUR MEDS BY ELIG CLIN: HCPCS | Performed by: INTERNAL MEDICINE

## 2020-02-11 PROCEDURE — 4040F PNEUMOC VAC/ADMIN/RCVD: CPT | Performed by: INTERNAL MEDICINE

## 2020-02-11 PROCEDURE — 1036F TOBACCO NON-USER: CPT | Performed by: INTERNAL MEDICINE

## 2020-02-11 PROCEDURE — 2022F DILAT RTA XM EVC RTNOPTHY: CPT | Performed by: INTERNAL MEDICINE

## 2020-02-11 PROCEDURE — G8482 FLU IMMUNIZE ORDER/ADMIN: HCPCS | Performed by: INTERNAL MEDICINE

## 2020-02-11 NOTE — PROGRESS NOTES
20 1302   BP: 129/65   Site: Right Upper Arm   Position: Sitting   Cuff Size: Medium Adult   Weight: 216 lb 6.4 oz (98.2 kg)   Height: 5' 5\" (1.651 m)     Body mass index is 36.01 kg/m².      Wt Readings from Last 3 Encounters:   20 216 lb 6.4 oz (98.2 kg)   20 214 lb (97.1 kg)   10/14/19 209 lb 3.2 oz (94.9 kg)     BP Readings from Last 3 Encounters:   20 129/65   20 118/60   10/14/19 117/64        Past Medical History:   Diagnosis Date    Asthma     childhood     CAD (coronary artery disease)     Coronary artery calcification seen on CAT scan     GERD (gastroesophageal reflux disease)     Hyperlipidemia     Hypertension     Melanoma in situ ([address]     Melanoma in situ of skin of buttock ([address] 2016    RT abdomen    Moderate single current episode of major depressive disorder ([address] 2017    Obesity     Osteoarthritis     Proteinuria     Salcedo Hunt syndrome     Rosacea     Stricture, esophagus     Type II or unspecified type diabetes mellitus without mention of complication, not stated as uncontrolled     Vitamin D deficiency      Past Surgical History:   Procedure Laterality Date    CHOLECYSTECTOMY      COLONOSCOPY      TOE AMPUTATION Left     lawnmower accident- 2 toes    UPPER GASTROINTESTINAL ENDOSCOPY      VASECTOMY       Family History   Problem Relation Age of Onset    Cancer Father         kidney    Diabetes Father     Kidney Disease Father     Diabetes Sister     Cancer Paternal Grandfather         melanoma     Social History     Tobacco Use   Smoking Status Former Smoker    Packs/day: 2.00    Years: 15.00    Pack years: 30.00    Types: Cigarettes    Last attempt to quit:     Years since quittin.1   Smokeless Tobacco Never Used   Tobacco Comment    quit       Social History     Substance and Sexual Activity   Alcohol Use Yes    Frequency: Monthly or less    Binge frequency: Never    Comment: rarely BHARATHI Florence is a 78 y.o. male who is here for a follow-up for management of uncontrolled DM. Patient has a PMH of Type 2 DM, hypertension, hyperlipidemia , melanoma ( managed by Dr. Yesi Russell), obesity. Diagnosed with Diabetes Mellitus type 2 since the age of 38 yrs,  Course has been variable . Microvascular complications: No known retinopathy (Last eye exam: 4/18), No nephropathy . No Peripheral neuropathy  No Macrovascular complications. Had a normal CTA coronary arteries in 11/2013. Home regimen:  Lantus 40 units in A.M   Humalog 5 units with meals. Synjardy  mg daily. Previous meds : Unable to tolerate victoza. Trulicity ( nausea)Xigduo 5-500 mg daily. -150  Lunch 100-200  Dinner 120-150  Bedtime 100-200    Using freestyle danyell        Hypoglycemia : No episodes recently. Has Hypoglycemia awareness. Diet:. Eats breakfast at noon, lunch at 5 p.m and dinner at 9 P.M  Had Nutrition education:  No planned exercise. Hyperlipidemia:  He has mixed hyperlipidemia  O  Lipitor 20 mg daily. Tolerating without side effects     Hypertension: has HTN for many yrs  Was on Exforge 5-160 mg daily. Now taking losartan 100 mg daily, amlodipine 5 mg daily. Tolerating without side effects    He has been losing weight with life style changes    Review of Systems   Constitutional: Positive for malaise/fatigue. Negative for weight loss. HENT: Negative for sore throat. Eyes: Negative for blurred vision. Respiratory: Negative for cough and shortness of breath. Cardiovascular: Negative for chest pain and palpitations. Gastrointestinal: Negative for heartburn, nausea, vomiting and abdominal pain. Genitourinary: Negative for urgency and frequency. Musculoskeletal: Positive for joint pain. Negative for myalgias and back pain. Skin: Negative for rash. Neurological: Positive for tingling and sensory change. Negative for headaches.    Endo/Heme/Allergies:

## 2020-03-05 ENCOUNTER — EMPLOYEE WELLNESS (OUTPATIENT)
Dept: OTHER | Age: 70
End: 2020-03-05

## 2020-03-05 LAB
CHOLESTEROL, TOTAL: 127 MG/DL (ref 0–199)
GLUCOSE BLD-MCNC: 168 MG/DL (ref 70–99)
HDLC SERPL-MCNC: 39 MG/DL (ref 40–60)
LDL CHOLESTEROL CALCULATED: 71 MG/DL
TRIGL SERPL-MCNC: 84 MG/DL (ref 0–150)

## 2020-03-06 LAB
ESTIMATED AVERAGE GLUCOSE: 180 MG/DL
HBA1C MFR BLD: 7.9 %

## 2020-03-08 LAB
3-OH-COTININE: <2 NG/ML
COTININE: <2 NG/ML
NICOTINE: <2 NG/ML

## 2020-05-15 ENCOUNTER — TELEPHONE (OUTPATIENT)
Dept: PHARMACY | Facility: CLINIC | Age: 70
End: 2020-05-15

## 2020-05-15 NOTE — TELEPHONE ENCOUNTER
CLINICAL PHARMACY NOTE - [facility] AT [facility] Diabetes Program    Hudson Coleman is a 78 y.o. male enrolled in the [facility] Diabetes Program.      Identified care gap: A1c 7.9% and Recent Medication changes    Medications:  Current Outpatient Medications   Medication Sig Dispense Refill    Empagliflozin-metFORMIN HCl ER (SYNJARDY XR)  MG TB24 Take 1 tablet by mouth daily 90 tablet 3    omeprazole (PRILOSEC) 20 MG delayed release capsule TAKE 1 CAPSULE BY MOUTH 2 TIMES A  capsule 1    LANTUS 100 UNIT/ML injection vial INJECT 50 UNITS UNDER THE SKIN IN THE MORNING BEFORE BREAKFAST 50 mL 2    TRUEPLUS INSULIN SYRINGE 31G X 5/16\" 1 ML MISC USE DAILY 100 each 3    Continuous Blood Gluc Sensor (FREESTYLE LA 14 DAY SENSOR) MISC USE ONE UNDER THE SKIN EVERY 14 DAYS 2 each 5    insulin lispro (HUMALOG KWIKPEN) 100 UNIT/ML pen INJECT 10 UNITS UNDER THE SKIN THREE TIMES A DAY BEFORE MEALS (Patient taking differently: Inject 4-6 Units into the skin 3 times daily (before meals) ) 30 mL 3    ACCU-CHEK FASTCLIX LANCETS MISC USE 8 TIMES DAILY AS DIRECTED 240 each 5    TRUEPLUS PEN NEEDLES 31G X 8 MM MISC USE FOR HUMALOG AND VICTOZA INJECTIONS FOUR TIMES A  each 5    atorvastatin (LIPITOR) 20 MG tablet TAKE 1 TABLET BY MOUTH ONE TIME DAILY 90 tablet 3    amLODIPine (NORVASC) 5 MG tablet TAKE 1 TABLET BY MOUTH ONE TIME A DAY 90 tablet 3    losartan (COZAAR) 100 MG tablet TAKE 1 TABLET BY MOUTH ONE TIME A DAY 90 tablet 3    Continuous Blood Gluc  (FREESTYLE LA 14 DAY READER) DORA 1 each by Does not apply route daily 1 Device 0    AGAMATRIX PRESTO TEST strip TEST BLOOD SUGAR 8 TIMES DAILY 800 each 3    Cholecalciferol (VITAMIN D3) 1000 UNITS CAPS Take 1,000 each by mouth daily.  aspirin EC 81 MG EC tablet Take 1 tablet by mouth daily. 30 tablet 11     No current facility-administered medications for this visit.       Labs:  Lab Results   Component Value Date

## 2020-06-11 ENCOUNTER — OFFICE VISIT (OUTPATIENT)
Dept: ENDOCRINOLOGY | Age: 70
End: 2020-06-11
Payer: COMMERCIAL

## 2020-06-11 VITALS
BODY MASS INDEX: 35.75 KG/M2 | OXYGEN SATURATION: 96 % | SYSTOLIC BLOOD PRESSURE: 131 MMHG | DIASTOLIC BLOOD PRESSURE: 76 MMHG | HEART RATE: 69 BPM | HEIGHT: 65 IN | WEIGHT: 214.6 LBS

## 2020-06-11 LAB — HBA1C MFR BLD: 8.3 %

## 2020-06-11 PROCEDURE — G8427 DOCREV CUR MEDS BY ELIG CLIN: HCPCS | Performed by: INTERNAL MEDICINE

## 2020-06-11 PROCEDURE — 3017F COLORECTAL CA SCREEN DOC REV: CPT | Performed by: INTERNAL MEDICINE

## 2020-06-11 PROCEDURE — 3052F HG A1C>EQUAL 8.0%<EQUAL 9.0%: CPT | Performed by: INTERNAL MEDICINE

## 2020-06-11 PROCEDURE — 99214 OFFICE O/P EST MOD 30 MIN: CPT | Performed by: INTERNAL MEDICINE

## 2020-06-11 PROCEDURE — 2022F DILAT RTA XM EVC RTNOPTHY: CPT | Performed by: INTERNAL MEDICINE

## 2020-06-11 PROCEDURE — 1036F TOBACCO NON-USER: CPT | Performed by: INTERNAL MEDICINE

## 2020-06-11 PROCEDURE — 1123F ACP DISCUSS/DSCN MKR DOCD: CPT | Performed by: INTERNAL MEDICINE

## 2020-06-11 PROCEDURE — G8417 CALC BMI ABV UP PARAM F/U: HCPCS | Performed by: INTERNAL MEDICINE

## 2020-06-11 PROCEDURE — 83036 HEMOGLOBIN GLYCOSYLATED A1C: CPT | Performed by: INTERNAL MEDICINE

## 2020-06-11 PROCEDURE — 95251 CONT GLUC MNTR ANALYSIS I&R: CPT | Performed by: INTERNAL MEDICINE

## 2020-06-11 PROCEDURE — 4040F PNEUMOC VAC/ADMIN/RCVD: CPT | Performed by: INTERNAL MEDICINE

## 2020-06-15 RX ORDER — OMEPRAZOLE 20 MG/1
CAPSULE, DELAYED RELEASE ORAL
Qty: 180 CAPSULE | Refills: 1 | Status: SHIPPED | OUTPATIENT
Start: 2020-06-15 | End: 2020-12-15

## 2020-06-15 RX ORDER — FLASH GLUCOSE SENSOR
KIT MISCELLANEOUS
Qty: 6 EACH | Refills: 5 | Status: SHIPPED | OUTPATIENT
Start: 2020-06-15 | End: 2021-06-21 | Stop reason: SDUPTHER

## 2020-06-15 RX ORDER — LOSARTAN POTASSIUM 100 MG/1
TABLET ORAL
Qty: 90 TABLET | Refills: 3 | Status: SHIPPED | OUTPATIENT
Start: 2020-06-15 | End: 2021-06-15

## 2020-08-12 ENCOUNTER — TELEPHONE (OUTPATIENT)
Dept: PHARMACY | Facility: CLINIC | Age: 70
End: 2020-08-12

## 2020-08-12 NOTE — TELEPHONE ENCOUNTER
[facility]  =============================================  Jerome Guido is a 69 y.o. male enrolled in the [address] [place] [address] Diabetes Program.      Identified care gap: A1c > 8%    Medications:  Current Outpatient Medications   Medication Sig Dispense Refill    Continuous Blood Gluc Sensor (FREESTYLE LA 14 DAY SENSOR) MISC USE ONE UNDER THE SKIN EVERY 14 DAYS 6 each 5    omeprazole (PRILOSEC) 20 MG delayed release capsule TAKE 1 CAPSULE BY MOUTH 2 TIMES A  capsule 1    losartan (COZAAR) 100 MG tablet TAKE 1 TABLET BY MOUTH ONE TIME A DAY 90 tablet 3    Empagliflozin-metFORMIN HCl ER (SYNJARDY XR)  MG TB24 Take 1 tablet by mouth daily 90 tablet 3    LANTUS 100 UNIT/ML injection vial INJECT 50 UNITS UNDER THE SKIN IN THE MORNING BEFORE BREAKFAST 50 mL 2    TRUEPLUS INSULIN SYRINGE 31G X 5/16\" 1 ML MISC USE DAILY 100 each 3    insulin lispro (HUMALOG KWIKPEN) 100 UNIT/ML pen INJECT 10 UNITS UNDER THE SKIN THREE TIMES A DAY BEFORE MEALS (Patient taking differently: Inject 4-6 Units into the skin 3 times daily (before meals) ) 30 mL 3    ACCU-CHEK FASTCLIX LANCETS MISC USE 8 TIMES DAILY AS DIRECTED 240 each 5    TRUEPLUS PEN NEEDLES 31G X 8 MM MISC USE FOR HUMALOG AND VICTOZA INJECTIONS FOUR TIMES A  each 5    atorvastatin (LIPITOR) 20 MG tablet TAKE 1 TABLET BY MOUTH ONE TIME DAILY 90 tablet 3    amLODIPine (NORVASC) 5 MG tablet TAKE 1 TABLET BY MOUTH ONE TIME A DAY 90 tablet 3    Continuous Blood Gluc  (FREESTYLE LA 14 DAY READER) DORA 1 each by Does not apply route daily 1 Device 0    AGAMATRIX PRESTO TEST strip TEST BLOOD SUGAR 8 TIMES DAILY 800 each 3    Cholecalciferol (VITAMIN D3) 1000 UNITS CAPS Take 1,000 each by mouth daily.  aspirin EC 81 MG EC tablet Take 1 tablet by mouth daily. 30 tablet 11     No current facility-administered medications for this visit.          Allergies:  No Known Allergies     Labs:  Lab Results Component Value Date    LABA1C 8.3 06/11/2020    LABA1C 7.9 03/05/2020    LABA1C 8.0 02/10/2020     Diabetes Care:  - Glycemic Goal: <8.0%. Is no longer at blood glucose goal but states that he is improving since his last A1c. Ronold Kanner His endocrinologist seems to be happy with this goal due to age and comorbidities. - Insulin Therapy- Patient states that he has been able to decrease his insulin therapy since increasing his dose of Synjardy. He is currently injecting 34 units of Lantus in the morning and Humalog 7, 6, 6. Says that he uses more in the morning due to his diet. He usually eats more carbs (oatmeal and toast). Since he increased his morning mealtime dose, he reports rarely going over 200.  - Still using the [employer]. He says that he still notices a slight difference (10-20) between the reading and his fingerstick meter. He knows when to check his fingerstick if his BG is <100 or if his symptoms don't match the reading. We briefly discussed the Dexcom per his request, but decided bo tFreestyle is still the better option for him. Plan:  Reached patient for review, see above. Patient really appreciates the followup. Will continue to follow as necessary. Thank you,    BRIANNA Bermeo, PharmD  [address]  Phone: [phone] option-7      CLINICAL PHARMACY CONSULT: MED RECONCILIATION/REVIEW ADDENDUM    For Pharmacy Admin Tracking Only    PHSO: Yes  Total # of Interventions Recommended: 1  - Updated Order #: 1 Updated Order Reason(s):  Other  Recommended intervention potential cost savings: 1  Total Interventions Accepted: 1  Time Spent (min): [address], PharmD  [address]

## 2020-09-08 ENCOUNTER — TELEPHONE (OUTPATIENT)
Dept: FAMILY MEDICINE CLINIC | Age: 70
End: 2020-09-08

## 2020-09-08 NOTE — TELEPHONE ENCOUNTER
----- Message from [facility] [place] - INPATIENT sent at 9/8/2020 10:36 AM EDT -----  Subject: Message to Provider    QUESTIONS  Information for Provider? Requesting a flu shot. Please call to advise   ---------------------------------------------------------------------------  --------------  CALL BACK INFO  What is the best way for the office to contact you? OK to leave message on   voicemail  Preferred Call Back Phone Number? [phone]  ---------------------------------------------------------------------------  --------------  SCRIPT ANSWERS  Relationship to Patient?  Self

## 2020-09-15 RX ORDER — AMLODIPINE BESYLATE 5 MG/1
TABLET ORAL
Qty: 90 TABLET | Refills: 3 | Status: SHIPPED | OUTPATIENT
Start: 2020-09-15 | End: 2021-06-21 | Stop reason: SDUPTHER

## 2020-09-15 RX ORDER — ATORVASTATIN CALCIUM 20 MG/1
TABLET, FILM COATED ORAL
Qty: 90 TABLET | Refills: 3 | Status: SHIPPED | OUTPATIENT
Start: 2020-09-15 | End: 2021-06-21 | Stop reason: SDUPTHER

## 2020-09-15 RX ORDER — PEN NEEDLE, DIABETIC 31 GX5/16"
NEEDLE, DISPOSABLE MISCELLANEOUS
Qty: 100 EACH | Refills: 5 | Status: SHIPPED | OUTPATIENT
Start: 2020-09-15 | End: 2021-03-18

## 2020-10-13 ENCOUNTER — VIRTUAL VISIT (OUTPATIENT)
Dept: ENDOCRINOLOGY | Age: 70
End: 2020-10-13
Payer: COMMERCIAL

## 2020-10-13 PROCEDURE — G8427 DOCREV CUR MEDS BY ELIG CLIN: HCPCS | Performed by: INTERNAL MEDICINE

## 2020-10-13 PROCEDURE — 1123F ACP DISCUSS/DSCN MKR DOCD: CPT | Performed by: INTERNAL MEDICINE

## 2020-10-13 PROCEDURE — 99214 OFFICE O/P EST MOD 30 MIN: CPT | Performed by: INTERNAL MEDICINE

## 2020-10-13 PROCEDURE — 2022F DILAT RTA XM EVC RTNOPTHY: CPT | Performed by: INTERNAL MEDICINE

## 2020-10-13 PROCEDURE — 4040F PNEUMOC VAC/ADMIN/RCVD: CPT | Performed by: INTERNAL MEDICINE

## 2020-10-13 PROCEDURE — 3052F HG A1C>EQUAL 8.0%<EQUAL 9.0%: CPT | Performed by: INTERNAL MEDICINE

## 2020-10-13 PROCEDURE — 3017F COLORECTAL CA SCREEN DOC REV: CPT | Performed by: INTERNAL MEDICINE

## 2020-10-13 NOTE — PROGRESS NOTES
Endocrinology  Girma Chapman M.D. Phone: [phone]   FAX: [phone]       Bud Lewis   YOB: 1950    Date of Visit:  10/13/2020    No Known Allergies  Outpatient Medications Marked as Taking for the 10/13/20 encounter (Virtual Visit) with Cristi Oliveira MD   Medication Sig Dispense Refill    amLODIPine (NORVASC) 5 MG tablet TAKE 1 TABLET BY MOUTH ONE TIME A DAY 90 tablet 3    atorvastatin (LIPITOR) 20 MG tablet TAKE 1 TABLET BY MOUTH ONE TIME A DAY 90 tablet 3    TRUEPLUS PEN NEEDLES 31G X 8 MM MISC USE WITH HUMALOG AND VICTOZA INJECTIONS FOUR TIMES A  each 5    Continuous Blood Gluc Sensor (FREESTYLE LA 14 DAY SENSOR) MISC USE ONE UNDER THE SKIN EVERY 14 DAYS 6 each 5    omeprazole (PRILOSEC) 20 MG delayed release capsule TAKE 1 CAPSULE BY MOUTH 2 TIMES A  capsule 1    losartan (COZAAR) 100 MG tablet TAKE 1 TABLET BY MOUTH ONE TIME A DAY 90 tablet 3    Empagliflozin-metFORMIN HCl ER (SYNJARDY XR)  MG TB24 Take 1 tablet by mouth daily 90 tablet 3    LANTUS 100 UNIT/ML injection vial INJECT 50 UNITS UNDER THE SKIN IN THE MORNING BEFORE BREAKFAST 50 mL 2    TRUEPLUS INSULIN SYRINGE 31G X 5/16\" 1 ML MISC USE DAILY 100 each 3    insulin lispro (HUMALOG KWIKPEN) 100 UNIT/ML pen INJECT 10 UNITS UNDER THE SKIN THREE TIMES A DAY BEFORE MEALS 30 mL 3    ACCU-CHEK FASTCLIX LANCETS MISC USE 8 TIMES DAILY AS DIRECTED 240 each 5    Continuous Blood Gluc  (FREESTYLE LA 14 DAY READER) DORA 1 each by Does not apply route daily 1 Device 0    AGAMATRIX PRESTO TEST strip TEST BLOOD SUGAR 8 TIMES DAILY 800 each 3    Cholecalciferol (VITAMIN D3) 1000 UNITS CAPS Take 1,000 each by mouth daily.  aspirin EC 81 MG EC tablet Take 1 tablet by mouth daily. 30 tablet 11         There were no vitals filed for this visit. There is no height or weight on file to calculate BMI.      Wt Readings from Last 3 Encounters:   06/11/20 214 lb 9.6 oz (97.3 kg) 20 212 lb (96.2 kg)   20 216 lb 6.4 oz (98.2 kg)     BP Readings from Last 3 Encounters:   20 131/76   20 129/65   20 118/60        Past Medical History:   Diagnosis Date    Asthma     childhood     CAD (coronary artery disease)     Coronary artery calcification seen on CAT scan     GERD (gastroesophageal reflux disease)     Hyperlipidemia     Hypertension     [facility]     Melanoma in situ of skin of buttock ([facility] 2016    RT abdomen    Moderate single current episode of major depressive disorder ([facility] 2017    Obesity     Osteoarthritis     Proteinuria     Salcedo Hunt syndrome     Rosacea     Stricture, esophagus     Type II or unspecified type diabetes mellitus without mention of complication, not stated as uncontrolled     Vitamin D deficiency      Past Surgical History:   Procedure Laterality Date    CATARACT REMOVAL Right 10/2020    CHOLECYSTECTOMY      COLONOSCOPY      TOE AMPUTATION Left     lawnmower accident- 2 toes    UPPER GASTROINTESTINAL ENDOSCOPY      VASECTOMY       Family History   Problem Relation Age of Onset    Cancer Father         kidney    Diabetes Father     Kidney Disease Father     Diabetes Sister     Cancer Paternal Grandfather         melanoma     Social History     Tobacco Use   Smoking Status Former Smoker    Packs/day: 2.00    Years: 15.00    Pack years: 30.00    Types: Cigarettes    Last attempt to quit:     Years since quittin.7   Smokeless Tobacco Never Used   Tobacco Comment    quit       Social History     Substance and Sexual Activity   Alcohol Use Yes    Frequency: Monthly or less    Binge frequency: Never    Comment: rarely       HPI      Leida Pride is a 69 y.o. male who is here for a follow-up for management of uncontrolled DM. Due to the COVID-19 restrictions on close contact interactions the patient's visit was conducted via video link  ( doxy. me) in lieu of a face to face visit. Location for patient : home  Physician : home    Pursuant to the emergency declaration under the [facility], [address] [place] [address] and the [employer] Act, this Virtual  Visit was conducted, with patient's consent, to reduce the patient's risk of exposure to COVID-19 and provide necessary care. Because this was a Virtual Visit, evaluation of the following organ systems was limited: Vitals, Constitutional, EENT, Resp, CV, GI, , MS, Neuro, Skin. Heme. Lymph, Imm. Patient is aware that that he/she may receive a bill for this service, depending on her insurance coverage, and has provided verbal consent to proceed. Physical exam in today's note represents findings from the last actual visit      Patient has a PMH of Type 2 DM, hypertension, hyperlipidemia , melanoma ( managed by Dr. Stephani Barreto), obesity. Eye surgery ( 10/.20 )     Diagnosed with Diabetes Mellitus type 2 since the age of 38 yrs,  Course has been variable . Microvascular complications: No known retinopathy (Last eye exam: 4/18), No nephropathy . No Peripheral neuropathy  No Macrovascular complications. Had a normal CTA coronary arteries in 11/2013. Home regimen:  Lantus 34 units in A.M   Humalog 6-7 units with meals. Synjardy  mg daily. Previous meds : Unable to tolerate victoza. Trulicity ( nausea)Xigduo 5-500 mg daily. -150  Lunch 100-200  Dinner 120-150  Bedtime 100-200    Using freestyle danyell        Hypoglycemia : No significant episodes recently. Has Hypoglycemia awareness. Diet:. Eats breakfast at noon, lunch at 5 p.m and dinner at 9 P.M  Had Nutrition education:  No planned exercise. Hyperlipidemia:  He has mixed hyperlipidemia  O  Lipitor 20 mg daily. Tolerating without side effects     Hypertension: has HTN for many yrs  Was on Exforge 5-160 mg daily.     Now taking losartan 100 mg daily, amlodipine 5 mg daily. Tolerating without side effects    He has been losing weight with life style changes    Review of Systems   Constitutional: Positive for malaise/fatigue. Negative for weight loss. HENT: Negative for sore throat. Eyes: Negative for blurred vision. Respiratory: Negative for cough and shortness of breath. Cardiovascular: Negative for chest pain and palpitations. Gastrointestinal: Negative for heartburn, nausea, vomiting and abdominal pain. Genitourinary: Negative for urgency and frequency. Musculoskeletal: Positive for joint pain. Negative for myalgias and back pain. Skin: Negative for rash. Neurological: Positive for tingling and sensory change. Negative for headaches. Endo/Heme/Allergies: Negative for polydipsia. Psychiatric/Behavioral: Negative for depression. The patient is not nervous/anxious. Physical Exam   Constitutional: He is oriented to person, place, and time. He appears well-developed. No distress. Assessment/Plan      1. Type 2 DM     Margaux Erwin is a 69 y.o. male has Type 2 DM with obesity and insulin resistance. Uncontrolled. A1c of 8.7 %--->8.4 %--->7.8 %--->8.2 %--->7.9 % ---> 7.4 %---> 7.2 %---> 7.6 %---> 7.2 %---> 7.6 %---> 7.8 %--->8.1 %---> 8 %---> 8.6 %---> 8.1 %  ---> 8.6 %  ---> 9.6 % --->8.6 % --> 8.8 % ---> 8.7 %--->8 % ---> 8 % ---> 7.9 % ---> 7.9 % --> 8.3 %       Using freestyle danyell. He has been able to overcome phobia of low sugars. Less concerned about hypoglycemia. BS running high     -Continue lantus 34 units QAM   -ContinueSynjardy to  mg daily.     -Use Humalog 7-8 units with meals + SSI    Advised to reduce carb intake. Saw [facility] Diabetes educator , Curtis Cordero.     Advised follow-up with the ophthalmologist once a year. Last urine microalbumin/cr ratio high. On losartan     Discussed foot care. Pt on ASA. Former smoker. 2. Hypertension.    BP at goal.     3. Hyperlipidemia. On statins. Labs in 07/18--> 01/19---> 03/19--> 02/20    HDL 46--> 43---> 38---> 39  ---> 100---> 178---> 83  LDL 45---> 66 ---> 48---> 62      4. Melanoma S/p surgery. Will follow-up with Gus Hatfield for follow-up.

## 2020-10-19 VITALS — BODY MASS INDEX: 35.28 KG/M2 | WEIGHT: 212 LBS

## 2020-10-26 RX ORDER — INSULIN LISPRO 100 [IU]/ML
INJECTION, SOLUTION INTRAVENOUS; SUBCUTANEOUS
Qty: 30 ML | Refills: 3 | Status: SHIPPED | OUTPATIENT
Start: 2020-10-26 | End: 2021-06-21 | Stop reason: SDUPTHER

## 2020-10-26 RX ORDER — INSULIN GLARGINE 100 [IU]/ML
34 INJECTION, SOLUTION SUBCUTANEOUS EVERY MORNING
Qty: 4 VIAL | Refills: 2 | Status: SHIPPED | OUTPATIENT
Start: 2020-10-26 | End: 2021-06-21 | Stop reason: SDUPTHER

## 2020-11-03 PROBLEM — I25.10 CAD (CORONARY ARTERY DISEASE): Status: RESOLVED | Noted: 2020-11-03 | Resolved: 2020-11-03

## 2020-11-09 DIAGNOSIS — E11.9 DIABETES MELLITUS WITHOUT COMPLICATION (HCC): ICD-10-CM

## 2020-11-09 DIAGNOSIS — I10 BENIGN ESSENTIAL HTN: ICD-10-CM

## 2020-11-09 LAB
A/G RATIO: 2.3 (ref 1.1–2.2)
ALBUMIN SERPL-MCNC: 4.2 G/DL (ref 3.4–5)
ALP BLD-CCNC: 73 U/L (ref 40–129)
ALT SERPL-CCNC: 20 U/L (ref 10–40)
ANION GAP SERPL CALCULATED.3IONS-SCNC: 11 MMOL/L (ref 3–16)
AST SERPL-CCNC: 16 U/L (ref 15–37)
BILIRUB SERPL-MCNC: 0.3 MG/DL (ref 0–1)
BUN BLDV-MCNC: 15 MG/DL (ref 7–20)
CALCIUM SERPL-MCNC: 9 MG/DL (ref 8.3–10.6)
CHLORIDE BLD-SCNC: 105 MMOL/L (ref 99–110)
CO2: 28 MMOL/L (ref 21–32)
CREAT SERPL-MCNC: 0.9 MG/DL (ref 0.8–1.3)
GFR AFRICAN AMERICAN: >60
GFR NON-AFRICAN AMERICAN: >60
GLOBULIN: 1.8 G/DL
GLUCOSE BLD-MCNC: 135 MG/DL (ref 70–99)
POTASSIUM SERPL-SCNC: 4.4 MMOL/L (ref 3.5–5.1)
SODIUM BLD-SCNC: 144 MMOL/L (ref 136–145)
TOTAL PROTEIN: 6 G/DL (ref 6.4–8.2)

## 2020-11-10 LAB
ESTIMATED AVERAGE GLUCOSE: 194.4 MG/DL
HBA1C MFR BLD: 8.4 %

## 2021-01-07 ENCOUNTER — TELEPHONE (OUTPATIENT)
Dept: PHARMACY | Facility: CLINIC | Age: 71
End: 2021-01-07

## 2021-01-07 NOTE — TELEPHONE ENCOUNTER
Called patient to schedule pharmacist appointment to discuss medications for Diabetes Management Program.    No answer. Left VM on home/cell TAD: Please call back at [phone] Option #7 to retrieve the above message.

## 2021-01-28 ENCOUNTER — SCHEDULED TELEPHONE ENCOUNTER (OUTPATIENT)
Dept: PHARMACY | Facility: CLINIC | Age: 71
End: 2021-01-28

## 2021-01-28 NOTE — TELEPHONE ENCOUNTER
[facility] CLINICAL PHARMACY REVIEW - Be Well with Diabetes    Bre Gordon is a 69 y.o. male enrolled in the [facility] [place] [facility] Diabetes Program. Patient provided Sanfordssangel Mcneal with verbal consent to remain in the program for this year. Medications:  Medication Sig    omeprazole (PRILOSEC) 20 MG delayed release capsule TAKE 1 CAPSULE BY MOUTH 2 TIMES A DAY    SYNJARDY XR  MG TB24  · Covered by DM program TAKE 1 TABLET BY MOUTH ONE TIME A DAY    insulin lispro, 1 Unit Dial, (HUMALOG KWIKPEN) 100 UNIT/ML SOPN  · Covered by DM program INJECT 10 UNITS UNDER THE SKIN THREE TIMES A DAY BEFORE MEALS    insulin glargine (LANTUS) 100 UNIT/ML injection vial  · Covered by DM program Inject 34 Units into the skin every morning    amLODIPine (NORVASC) 5 MG tablet  · Covered by DM program (newly added in 2021) TAKE 1 TABLET BY MOUTH ONE TIME A DAY    atorvastatin (LIPITOR) 20 MG tablet  · Covered by DM program TAKE 1 TABLET BY MOUTH ONE TIME A DAY    losartan (COZAAR) 100 MG tablet  · Covered by DM program TAKE 1 TABLET BY MOUTH ONE TIME A DAY    Cholecalciferol (VITAMIN D3) 1000 UNITS CAPS Take 1,000 each by mouth daily.  aspirin EC 81 MG EC tablet  · Covered by DM program Take 1 tablet by mouth daily. Current Pharmacy: [facility]  Current testing supplies/frequency: [facility] and generic pen needles and syringes. Informed patient that Hoover will be covered in 2021 up to $600. Per pharmacy refill history, in 2021, patient paid $130 for 90 day supply x 4 for Hoover sensors. Per [employer], PA has been put in place for the next time he refills Selene. Informed patient that he may exceed the $600 limit in 2021.      Allergies:  No Known Allergies     Vitals/Labs:  BP Readings from Last 3 Encounters:   06/11/20 131/76   02/11/20 129/65   01/08/20 118/60     Lab Results   Component Value Date    LABMICR 6.50 (H) 02/10/2020     Lab Results   Component Value Date LABA1C 8.4 2020    LABA1C 8.3 2020    LABA1C 7.9 2020     Lab Results   Component Value Date    CHOL 127 2020    TRIG 84 2020    HDL 39 (L) 2020    LDLCALC 71 2020     ALT   Date Value Ref Range Status   2020 20 10 - 40 U/L Final     AST   Date Value Ref Range Status   2020 16 15 - 37 U/L Final     The ASCVD Risk score (Linnell Jeans., et al., 2013) failed to calculate for the following reasons: The valid total cholesterol range is 130 to 320 mg/dL     Lab Results   Component Value Date    CREATININE 0.9 2020     CrCl cannot be calculated (Unknown ideal weight. ). eGFR: > 60 mL/min/1.73 m^2    Immunizations:  Immunization History   Administered Date(s) Administered    Influenza 10/25/2013    Influenza Virus Vaccine 10/11/2010, 2011, 10/03/2012    Influenza, High Dose (Fluzone 65 yrs and older) 2015, 10/24/2016, 10/24/2017, 10/17/2018    Influenza, High-dose, Judyann Zaidning, 65 yrs +, IM (Fluzone) 2020    Influenza, Triv, inactivated, subunit, adjuvanted, IM (Fluad 65 yrs and older) 10/17/2019    Pneumococcal Conjugate 13-valent (Vqqopub90) 2015    Pneumococcal Conjugate 7-valent (Prevnar7) 2002    Pneumococcal Polysaccharide (Yoxbvzxvl25) 2010, 2017    Tdap (Boostrix, Adacel) 10/10/2012    Zoster Live (Zostavax) 10/25/2013      Social History:  Social History     Tobacco Use    Smoking status: Former Smoker     Packs/day: 2.00     Years: 15.00     Pack years: 30.00     Types: Cigarettes     Quit date:      Years since quittin.0    Smokeless tobacco: Never Used    Tobacco comment: quit    Substance Use Topics    Alcohol use: Yes     Frequency: Monthly or less     Binge frequency: Never     Comment: rarely     ASSESSMENT:  Initial Program Requirements (Y indicates has completed for the year, N indicates needs to be completed by 2021):   No - Provider Visit for DM (1st)  No - A1c (1st) Ongoing Program Requirements (Y indicates has completed for the year, N indicates needs to be completed by 12/31/2021): No - Provider Visit for DM (2nd)  No - ACC/diabetes educator visit (if A1c over 8%) - per pt, never saw DM educator in 2020 d/t office being closed for COVID-19. No - A1c (2nd)  No - Lipid panel  No - Urine microalbumin  Yes - Pneumococcal vaccination: up to date  No - Influenza vaccination for Fall 2021  Yes - Medication adherence over 70% - n/a; prescribed insulin   Yes - On statin - atorvastatin   Yes - On ACEi/ARB - losartan     Formulary Medication Review:  Non-formulary or medications with cost-effective alternatives: none. Current medications eligible for copay waiver, up to $600, through [employer]:  - Amlodipine (newly added in 2021), aspirin, atorvastatin, Lantus, Humalog, losartan, Synjardy.   - Freestyle Selene and generic pen needles and syringes     Diabetes Care:   - Glycemic Goal: <7.0%. Is not at blood glucose goal but is on basal/bolus and SGLT2/metformin therapy. Type 2 DM under inadequate control as evidenced by A1c stable > 8% in 2020.  - Current symptoms/problems include none  - Home blood sugar records:  fasting range: 90's  - Any episodes of hypoglycemia? no  - Known diabetic complications: none  - Eye exam current (within one year): unknown  - Foot exam current (within one year): unknown  - Duplicate MOA: none  - Reduce Pill [place]: n/a - prescribed Synjardy  - Appropriateness of Insulin Therapy: basal/bolus SSI regimen appears appropriate and managed by endocrinologist. Has OV scheduled with new endocrinologist on 3/1/21.   - Therapy Optimization: per endocrinologist note on 10/13/20: \"Unable to tolerate Victoza. Trulicity (nausea) Xigdup 5-500 mg daily. \" Will defer to endocrinologist.   - De-escalation of Therapy: not indicated at this time  - Daily aspirin? Yes  - Adherent to ACEi/ARB and/or statin: appears adherent per claims review. Other Considerations:  - Blood Pressure Goal: BP less than 140/90 mmHg due to history of DM: Is at blood pressure goal.  - Lipids: Patient is prescribed moderate-intensity statin therapy. - Smoking status: Quit 22 years ago    PLAN:  - Consideration(s) for provider:   · None at this time.    - DM program gaps identified:   · Initial requirements: Provider Visit for DM (1st) and A1c (1st)   · Ongoing requirements: Provider visit for DM (2nd), ACC/diabetes educator visit (if A1c over 8%), A1c (2nd), Lipid panel, Urine microalbumin and Influenza vaccination for [id number]   - Education to patient: Reminder A1c and lipid panel can be completed for free at [facility] screenings   - Follow up: PCP for identified gaps or as scheduled below  - Upcoming appointments:   Future Appointments   Date Time Provider Nikunj Casanova   3/1/2021 11:40 AM Yovani Pitt MD [facility]       Lyla Angelucci, PharmD, [address]  Direct: [phone]  Department, toll free [phone], option 7          For Pharmacy Admin Tracking Only    PHSO: Yes  Total # of Interventions Recommended: 0  Recommended intervention potential cost savings: 1  Total Interventions Accepted: 0  Time Spent (min): 45

## 2021-02-16 DIAGNOSIS — J45.20 MILD INTERMITTENT ASTHMA WITHOUT COMPLICATION: ICD-10-CM

## 2021-02-16 RX ORDER — ALBUTEROL SULFATE 90 UG/1
AEROSOL, METERED RESPIRATORY (INHALATION)
Qty: 1 INHALER | Refills: 3 | Status: SHIPPED | OUTPATIENT
Start: 2021-02-16

## 2021-02-16 NOTE — TELEPHONE ENCOUNTER
Medication and Quantity requested: albuterol (PROVENTIL HFA;VENTOLIN HFA) 108 (90 BASE) MCG/ACT inhaler  QTY: 1 inhaler    Last Visit  1/8/20  Pharmacy and phone number updated in EPIC:  yes    [facility]

## 2021-03-01 ENCOUNTER — VIRTUAL VISIT (OUTPATIENT)
Dept: ENDOCRINOLOGY | Age: 71
End: 2021-03-01
Payer: COMMERCIAL

## 2021-03-01 DIAGNOSIS — I10 ESSENTIAL HYPERTENSION: ICD-10-CM

## 2021-03-01 DIAGNOSIS — E78.2 MIXED HYPERLIPIDEMIA: ICD-10-CM

## 2021-03-01 DIAGNOSIS — E66.01 MORBIDLY OBESE (HCC): ICD-10-CM

## 2021-03-01 DIAGNOSIS — I25.10 CORONARY ARTERY CALCIFICATION SEEN ON CAT SCAN: ICD-10-CM

## 2021-03-01 DIAGNOSIS — E55.9 VITAMIN D DEFICIENCY: ICD-10-CM

## 2021-03-01 PROCEDURE — 99214 OFFICE O/P EST MOD 30 MIN: CPT | Performed by: INTERNAL MEDICINE

## 2021-03-01 PROCEDURE — G8427 DOCREV CUR MEDS BY ELIG CLIN: HCPCS | Performed by: INTERNAL MEDICINE

## 2021-03-01 PROCEDURE — 1123F ACP DISCUSS/DSCN MKR DOCD: CPT | Performed by: INTERNAL MEDICINE

## 2021-03-01 PROCEDURE — 4040F PNEUMOC VAC/ADMIN/RCVD: CPT | Performed by: INTERNAL MEDICINE

## 2021-03-01 PROCEDURE — 3052F HG A1C>EQUAL 8.0%<EQUAL 9.0%: CPT | Performed by: INTERNAL MEDICINE

## 2021-03-01 PROCEDURE — 2022F DILAT RTA XM EVC RTNOPTHY: CPT | Performed by: INTERNAL MEDICINE

## 2021-03-01 PROCEDURE — 3017F COLORECTAL CA SCREEN DOC REV: CPT | Performed by: INTERNAL MEDICINE

## 2021-03-01 NOTE — PROGRESS NOTES
Santa Samson is a 69 y.o. male who is here for a follow-up for management of uncontrolled DM. Patient has a PMH of Type 2 DM, hypertension, hyperlipidemia , melanoma ( managed by Dr. Tierra Stafford), obesity. Eye surgery ( 10/.20 )     Diagnosed with Diabetes Mellitus type 2 since the age of 38 yrs,  Course has been variable . Microvascular complications: No known retinopathy (Last eye exam: 4/18), No nephropathy . No Peripheral neuropathy  No Macrovascular complications. Had a normal CTA coronary arteries in 11/2013. Home regimen:  Lantus 34 units in A.M   Humalog 6-7 units with meals. Synjardy  mg daily. Previous meds : Unable to tolerate victoza. Trulicity ( nausea)Xigduo 5-500 mg daily. Using freestyle danyell  Hypoglycemia : No significant episodes recently. Has Hypoglycemia awareness. Diet:. Eats breakfast at noon, lunch at 5 p.m and dinner at 9 P.M  Had Nutrition education:  No planned exercise. Patient also has hyperlipidemia and is on Lipitor 20 mg daily. Tolerating without side effects    He carries the diagnosis of  HTN for many yrs  Was on Exforge 5-160 mg daily. Now taking losartan 100 mg daily, amlodipine 5 mg daily.   Tolerating without side effects    INTERIM    He has been losing weight with life style changes    No Known Allergies  Outpatient Medications Marked as Taking for the 3/1/21 encounter (Virtual Visit) with Chaka Moreau MD   Medication Sig Dispense Refill    albuterol sulfate  (90 Base) MCG/ACT inhaler 2 puffs every 6 hours as needed for shortness of breath or wheezing 1 Inhaler 3    omeprazole (PRILOSEC) 20 MG delayed release capsule TAKE 1 CAPSULE BY MOUTH 2 TIMES A  capsule 1    SYNJARDY XR  MG TB24 TAKE 1 TABLET BY MOUTH ONE TIME A DAY 90 tablet 3    insulin lispro, 1 Unit Dial, (HUMALOG KWIKPEN) 100 UNIT/ML SOPN INJECT 10 UNITS UNDER THE SKIN THREE TIMES A DAY BEFORE MEALS 30 mL 3    insulin glargine (LANTUS) 100 UNIT/ML injection vial Inject 34 Units into the skin every morning 4 vial 2    amLODIPine (NORVASC) 5 MG tablet TAKE 1 TABLET BY MOUTH ONE TIME A DAY 90 tablet 3    atorvastatin (LIPITOR) 20 MG tablet TAKE 1 TABLET BY MOUTH ONE TIME A DAY 90 tablet 3    TRUEPLUS PEN NEEDLES 31G X 8 MM MISC USE WITH HUMALOG AND VICTOZA INJECTIONS FOUR TIMES A  each 5    Continuous Blood Gluc Sensor (FREESTYLE LA 14 DAY SENSOR) MISC USE ONE UNDER THE SKIN EVERY 14 DAYS 6 each 5    losartan (COZAAR) 100 MG tablet TAKE 1 TABLET BY MOUTH ONE TIME A DAY 90 tablet 3    TRUEPLUS INSULIN SYRINGE 31G X 5/16\" 1 ML MISC USE DAILY 100 each 3    ACCU-CHEK FASTCLIX LANCETS MISC USE 8 TIMES DAILY AS DIRECTED 240 each 5    Continuous Blood Gluc  (FREESTYLE LA 14 DAY READER) DORA 1 each by Does not apply route daily 1 Device 0    AGAMATRIX PRESTO TEST strip TEST BLOOD SUGAR 8 TIMES DAILY 800 each 3    Cholecalciferol (VITAMIN D3) 1000 UNITS CAPS Take 1,000 each by mouth daily.  aspirin EC 81 MG EC tablet Take 1 tablet by mouth daily. 30 tablet 11         There were no vitals filed for this visit. There is no height or weight on file to calculate BMI.      Wt Readings from Last 3 Encounters:   06/11/20 214 lb 9.6 oz (97.3 kg)   03/05/20 212 lb (96.2 kg)   02/11/20 216 lb 6.4 oz (98.2 kg)     BP Readings from Last 3 Encounters:   06/11/20 131/76   02/11/20 129/65   01/08/20 118/60        Past Medical History:   Diagnosis Date    Asthma     childhood     CAD (coronary artery disease)     Coronary artery calcification seen on CAT scan     GERD (gastroesophageal reflux disease)     Hyperlipidemia     Hypertension     Melanoma in situ ([address]     Melanoma in situ of skin of buttock ([address] 11/2016    RT abdomen    Moderate single current episode of major depressive disorder ([address] 7/6/2017    Obesity     Osteoarthritis     Proteinuria     Salcedo Hunt syndrome 1999    Rosacea     Stricture, esophagus     Type II or unspecified type diabetes mellitus without mention of complication, not stated as uncontrolled     Vitamin D deficiency      Past Surgical History:   Procedure Laterality Date    CATARACT REMOVAL Right 10/2020    CHOLECYSTECTOMY      COLONOSCOPY  2002    TOE AMPUTATION Left     lawnmower accident- 2 toes    UPPER GASTROINTESTINAL ENDOSCOPY  2002    VASECTOMY       Family History   Problem Relation Age of Onset    Cancer Father         kidney    Diabetes Father     Kidney Disease Father     Diabetes Sister     Cancer Paternal Grandfather         melanoma     Social History     Tobacco Use   Smoking Status Former Smoker    Packs/day: 2.00    Years: 15.00    Pack years: 30.00    Types: Cigarettes    Quit date:     Years since quittin.1   Smokeless Tobacco Never Used   Tobacco Comment    quit       Social History     Substance and Sexual Activity   Alcohol Use Yes    Frequency: Monthly or less    Binge frequency: Never    Comment: rarely       Constitutional: no acute distress, well appearing and well nourished  Psychiatric: oriented to person, place and time, judgement and insight and normal, recent and remote memory intact and mood and affect are normal  Skin: skin and subcutaneous tissue is normal without visible mass,   Head and Face: visual inspection  of head and face revealed no abnormalities  Eyes: visual inspection showed no lid or conjunctival swelling, erythema or discharge, pupils are normal, equal, round  Ears/Nose: external inspection of ears and nose revealed no abnormalities, hearing is grossly normal  Oropharynx/Mouth/Face: lips, tongue and gums appear  normal with no lesions, the voice quality was normal  Neck: neck appears symmetric, with no visible masses,   Pulmonary: no increased work of breathing or signs of respiratory distress,  Musculoskeletal: normal on inspection    Neurological: normal coordination and normal general cortical function      Assessment/Plan      --- Uncontrolled Type 2 DM     Sandra Priest is a 69 y.o. male has Type 2 DM with obesity and insulin resistance. Uncontrolled. A1c of 8.7 %--->8.4 %--->7.8 %--->8.2 %--->7.9 % ---> 7.4 %---> 7.2 %---> 7.6 %---> 7.2 %---> 7.6 %---> 7.8 %--->8.1 %---> 8 %---> 8.6 %---> 8.1 %  ---> 8.6 %  ---> 9.6 % --->8.6 % --> 8.8 % ---> 8.7 %--->8 % ---> 8 % ---> 7.9 % ---> 7.9 % --> 8.3 %   Uing freestyle danyell, but does not scan has often previously had phobia of low sugars. Less concerned about hypoglycemia.   -Continue lantus 34 units QAM   -ContinueSynjardy to  mg daily.   -Use Humalog 7-8 units with meals + SSI  dexcom not covered as still has commercial insurance   Advised to reduce carb intake. Health maintenance     Advised follow-up with the ophthalmologist once a year. Last urine microalbumin/cr ratio high. On losartan   Discussed foot care. Pt on ASA. Former smoker. 2. Hypertension. BP at goal.     3. Hyperlipidemia. On statins. Labs in 07/18--> 01/19---> 03/19--> 02/20    HDL 46--> 43---> 38---> 39  ---> 100---> 178---> 83  LDL 45---> 66 ---> 48---> 62      4. Melanoma S/p surgery. TELEHEALTH EVALUATION -- Audio/Visual (During ARCVS-93 public health emergency)  Pursuant to the emergency declaration under the [address] [place] [address], Counts include 234 beds at the [facility] waiver authority and the [employer] Act, this Virtual  Visit was conducted, with patient's consent, to reduce the patient's risk of exposure to COVID-19 and provide care for  patient. Services were provided through a video synchronous discussion virtually to substitute for in-person clinic visit. Patient's location : home     Patient provided verbal consent to use the video visit. Total time spent : 35 min Reviewing the chart, conducting an interview, performing a limited exam by video and educating the patient on my assessment plan.   Patient is new to me as previously followed with Dr. Golden Daniel

## 2021-03-02 DIAGNOSIS — E55.9 VITAMIN D DEFICIENCY: ICD-10-CM

## 2021-03-02 DIAGNOSIS — E11.9 DIABETES MELLITUS WITHOUT COMPLICATION (HCC): ICD-10-CM

## 2021-03-02 DIAGNOSIS — E78.2 MIXED HYPERLIPIDEMIA: ICD-10-CM

## 2021-03-02 LAB
A/G RATIO: 2.1 (ref 1.1–2.2)
ALBUMIN SERPL-MCNC: 4.1 G/DL (ref 3.4–5)
ALP BLD-CCNC: 69 U/L (ref 40–129)
ALT SERPL-CCNC: 21 U/L (ref 10–40)
ANION GAP SERPL CALCULATED.3IONS-SCNC: 9 MMOL/L (ref 3–16)
AST SERPL-CCNC: 16 U/L (ref 15–37)
BILIRUB SERPL-MCNC: 0.4 MG/DL (ref 0–1)
BUN BLDV-MCNC: 16 MG/DL (ref 7–20)
CALCIUM SERPL-MCNC: 9.3 MG/DL (ref 8.3–10.6)
CHLORIDE BLD-SCNC: 104 MMOL/L (ref 99–110)
CHOLESTEROL, TOTAL: 134 MG/DL (ref 0–199)
CO2: 31 MMOL/L (ref 21–32)
CREAT SERPL-MCNC: 1 MG/DL (ref 0.8–1.3)
CREATININE URINE: 70.1 MG/DL (ref 39–259)
ESTIMATED AVERAGE GLUCOSE: 203 MG/DL
GFR AFRICAN AMERICAN: >60
GFR NON-AFRICAN AMERICAN: >60
GLOBULIN: 2 G/DL
GLUCOSE BLD-MCNC: 202 MG/DL (ref 70–99)
HBA1C MFR BLD: 8.7 %
HDLC SERPL-MCNC: 46 MG/DL (ref 40–60)
LDL CHOLESTEROL CALCULATED: 71 MG/DL
MICROALBUMIN UR-MCNC: 4.5 MG/DL
MICROALBUMIN/CREAT UR-RTO: 64.2 MG/G (ref 0–30)
POTASSIUM SERPL-SCNC: 4.1 MMOL/L (ref 3.5–5.1)
SODIUM BLD-SCNC: 144 MMOL/L (ref 136–145)
TOTAL PROTEIN: 6.1 G/DL (ref 6.4–8.2)
TRIGL SERPL-MCNC: 84 MG/DL (ref 0–150)
TSH SERPL DL<=0.05 MIU/L-ACNC: 3.61 UIU/ML (ref 0.27–4.2)
VITAMIN D 25-HYDROXY: 40.3 NG/ML
VLDLC SERPL CALC-MCNC: 17 MG/DL

## 2021-03-05 ENCOUNTER — TELEPHONE (OUTPATIENT)
Dept: ENDOCRINOLOGY | Age: 71
End: 2021-03-05

## 2021-03-06 ENCOUNTER — IMMUNIZATION (OUTPATIENT)
Dept: PRIMARY CARE CLINIC | Age: 71
End: 2021-03-06
Payer: COMMERCIAL

## 2021-03-06 PROCEDURE — 0031A COVID-19, J&J VACCINE, PF, 0.5 ML DOSE: CPT | Performed by: NURSE PRACTITIONER

## 2021-03-06 PROCEDURE — 91303 COVID-19, J&J VACCINE, PF, 0.5 ML DOSE: CPT | Performed by: NURSE PRACTITIONER

## 2021-03-11 RX ORDER — BLOOD SUGAR DIAGNOSTIC
STRIP MISCELLANEOUS
Qty: 100 EACH | Refills: 3 | Status: CANCELLED | OUTPATIENT
Start: 2021-03-11

## 2021-03-11 RX ORDER — PEN NEEDLE, DIABETIC 31 GX5/16"
NEEDLE, DISPOSABLE MISCELLANEOUS
Qty: 100 EACH | Refills: 5 | Status: CANCELLED | OUTPATIENT
Start: 2021-03-11

## 2021-03-18 ENCOUNTER — TELEPHONE (OUTPATIENT)
Dept: ENDOCRINOLOGY | Age: 71
End: 2021-03-18

## 2021-03-18 RX ORDER — PEN NEEDLE, DIABETIC 31 GX5/16"
NEEDLE, DISPOSABLE MISCELLANEOUS
Qty: 100 EACH | Refills: 5 | Status: SHIPPED | OUTPATIENT
Start: 2021-03-18 | End: 2021-06-21 | Stop reason: SDUPTHER

## 2021-03-18 RX ORDER — SYRINGE AND NEEDLE,INSULIN,1ML 31 GX5/16"
SYRINGE, EMPTY DISPOSABLE MISCELLANEOUS
Qty: 100 EACH | Refills: 3 | Status: SHIPPED | OUTPATIENT
Start: 2021-03-18 | End: 2021-06-21 | Stop reason: SDUPTHER

## 2021-03-18 NOTE — TELEPHONE ENCOUNTER
Fax from [facility] regarding the referral status update for the [facility] . The status is cancelled due to [place] out of network.

## 2021-03-18 NOTE — TELEPHONE ENCOUNTER
Called pt and informed below. Pt stated he is on the Hoover still and is going to hold off on the G6 due to insurance coverage. Pt will let us know when he wants to try again.

## 2021-04-02 NOTE — PROGRESS NOTES
Left VM for patient to call office back to schedule a f/u appt. Reminder letter placed in the mail. Labs already completed.

## 2021-04-05 ENCOUNTER — OFFICE VISIT (OUTPATIENT)
Dept: FAMILY MEDICINE CLINIC | Age: 71
End: 2021-04-05
Payer: COMMERCIAL

## 2021-04-05 ENCOUNTER — TELEPHONE (OUTPATIENT)
Dept: ENDOCRINOLOGY | Age: 71
End: 2021-04-05

## 2021-04-05 VITALS
BODY MASS INDEX: 35.82 KG/M2 | WEIGHT: 215 LBS | SYSTOLIC BLOOD PRESSURE: 132 MMHG | HEART RATE: 84 BPM | DIASTOLIC BLOOD PRESSURE: 72 MMHG | HEIGHT: 65 IN | OXYGEN SATURATION: 98 %

## 2021-04-05 DIAGNOSIS — I10 BENIGN ESSENTIAL HTN: ICD-10-CM

## 2021-04-05 DIAGNOSIS — M79.89 LEFT LEG SWELLING: ICD-10-CM

## 2021-04-05 DIAGNOSIS — Z13.6 ENCOUNTER FOR ABDOMINAL AORTIC ANEURYSM (AAA) SCREENING: ICD-10-CM

## 2021-04-05 DIAGNOSIS — I25.10 CORONARY ARTERY CALCIFICATION SEEN ON CAT SCAN: ICD-10-CM

## 2021-04-05 DIAGNOSIS — E78.2 MIXED HYPERLIPIDEMIA: ICD-10-CM

## 2021-04-05 PROCEDURE — 99214 OFFICE O/P EST MOD 30 MIN: CPT | Performed by: FAMILY MEDICINE

## 2021-04-05 PROCEDURE — G8427 DOCREV CUR MEDS BY ELIG CLIN: HCPCS | Performed by: FAMILY MEDICINE

## 2021-04-05 PROCEDURE — G8417 CALC BMI ABV UP PARAM F/U: HCPCS | Performed by: FAMILY MEDICINE

## 2021-04-05 PROCEDURE — 2022F DILAT RTA XM EVC RTNOPTHY: CPT | Performed by: FAMILY MEDICINE

## 2021-04-05 PROCEDURE — 3052F HG A1C>EQUAL 8.0%<EQUAL 9.0%: CPT | Performed by: FAMILY MEDICINE

## 2021-04-05 PROCEDURE — 1036F TOBACCO NON-USER: CPT | Performed by: FAMILY MEDICINE

## 2021-04-05 PROCEDURE — 4040F PNEUMOC VAC/ADMIN/RCVD: CPT | Performed by: FAMILY MEDICINE

## 2021-04-05 PROCEDURE — 3017F COLORECTAL CA SCREEN DOC REV: CPT | Performed by: FAMILY MEDICINE

## 2021-04-05 PROCEDURE — 1123F ACP DISCUSS/DSCN MKR DOCD: CPT | Performed by: FAMILY MEDICINE

## 2021-04-05 PROCEDURE — 3288F FALL RISK ASSESSMENT DOCD: CPT | Performed by: FAMILY MEDICINE

## 2021-04-05 NOTE — TELEPHONE ENCOUNTER
Pt phoned pt is having swelling and pain in the left leg. He is also needing to schedule a 3 month appt however I did not schedule because I was unsure if this is something Dr Elisha Ozuna treats if so pt was an earlier appt so she can see the swelling.

## 2021-04-05 NOTE — PROGRESS NOTES
Alexandra Hernandez is a 69 y.o. male. HPI:  Diabetes Mellitus Type II, Follow-up--   Lab Results   Component Value Date    LABA1C 8.7 03/02/2021      Checks sugars at home:Yes. fasting range: 80-100s, postprandial range: usually 140-160s but has been up to 200 after breakfast. Usually eats 2 pieces of toast, an egg and coffee for breakfast.  Last Eye Exam was in the past year. Pt is on ACEI or ARB. Pt denies foot ulcerations and visual disturbances. pt does adhere to a low carb diet. Taking lantus 34 units qam with breakfast. Taking humalog TID w meals, 6 units usually but follow sliding scale. Also on synjardy daily. Is carb counting, limits to <60g per meal. Has danyell meter for continuous testing. Is always worried about sugar dropping too low so avoid excessive activity. HTN--Pt seen here for follow up regarding HTN. BP checks at home:No   Pt denies chest pain and palpitations. Pt complains of none. Tolerating medications: No.  Pt does not exercise regularly and does adhere to a low salt diet. Hyperlipidemia:    Lab Results   Component Value Date    LDLCALC 71 03/02/2021   . He does not have myalgias from medication. Pt is  following Lifestyle modification including Low fat/low cholesterol diet, low carbohydrate diet, and does not exercise regularly. c/o left leg swelling x 1 week. No known injury. Is having pain over shin and calf as well.      Current Outpatient Medications   Medication Sig Dispense Refill    TRUEPLUS INSULIN SYRINGE 31G X 5/16\" 1 ML MISC USE DAILY 100 each 3    TRUEPLUS PEN NEEDLES 31G X 8 MM MISC USE WITH HUMALOG AND VICTOZA FOUR TIMES A  each 5    albuterol sulfate  (90 Base) MCG/ACT inhaler 2 puffs every 6 hours as needed for shortness of breath or wheezing 1 Inhaler 3    omeprazole (PRILOSEC) 20 MG delayed release capsule TAKE 1 CAPSULE BY MOUTH 2 TIMES A  capsule 1    SYNJARDY XR  MG TB24 TAKE 1 TABLET BY MOUTH ONE TIME A DAY 90 tablet 3    History:   Diagnosis Date    Asthma     childhood     CAD (coronary artery disease)     Coronary artery calcification seen on CAT scan     GERD (gastroesophageal reflux disease)     Hyperlipidemia     Hypertension     Mel[facility]oma [facility] ([facility]     [facility]anoma in [facility] of [facility]ock ([facility] 2016    RT abdomen    Moderate single current episode of major depressive disorder ([facility] 2017    Obesity     Osteoarthritis     Proteinuria     Salcedo Hunt syndrome     Rosacea     Stricture, esophagus     Type II or unspecified type diabetes mellitus without mention of complication, not stated as uncontrolled     Vitamin D deficiency      Past Surgical History:   Procedure Laterality Date    CATARACT REMOVAL Right 10/2020    CHOLECYSTECTOMY      COLONOSCOPY      TOE AMPUTATION Left     lawnmower accident- 2 toes    UPPER GASTROINTESTINAL ENDOSCOPY      VASECTOMY       Family History   Problem Relation Age of Onset    Cancer Father         kidney    Diabetes Father     Kidney Disease Father     Diabetes Sister     Cancer Paternal Grandfather         melanoma     Social History     Socioeconomic History    Marital status:      Spouse name: Not on file    Number of children: Not on file    Years of education: Not on file    Highest education level: Not on file   Occupational History    Occupation: retired    Social Needs    Financial resource strain: Not very hard    Food insecurity     Worry: Never true     Inability: Never true    Transportation needs     Medical: No     Non-medical: No   Tobacco Use    Smoking status: Former Smoker     Packs/day: 2.00     Years: 15.00     Pack years: 30.00     Types: Cigarettes     Quit date:      Years since quittin.2    Smokeless tobacco: Never Used    Tobacco comment: quit    Substance and Sexual Activity    Alcohol use: Yes     Frequency: Monthly or less     Binge frequency: Never     Comment: rarely    Drug use: No    Sexual activity: Yes     Partners: Female   Lifestyle    Physical activity     Days per week: 0 days     Minutes per session: 0 min    Stress: To some extent   Relationships    Social connections     Talks on phone: Three times a week     Gets together: Three times a week     Attends Yazidi service: Never     Active member of club or organization: No     Attends meetings of clubs or organizations: Never     Relationship status:     Intimate partner violence     Fear of current or ex partner: Not on file     Emotionally abused: Not on file     Physically abused: Not on file     Forced sexual activity: Not on file   Other Topics Concern    Not on file   Social History Narrative    Using [employer] and test to ensure he is accurate for his dosing          ROS:  Gen:  Denies fever, chills, headaches. HEENT:  Denies cold symptoms, sore throat. CV:  Denies chest pain or tightness, palpitations. Pulm:  Denies shortness of breath, cough. Abd:  Denies abdominal pain, change in bowel habits. I have reviewed the patient's medical history in detail and updated the computerized patient record as appropriate. OBJECTIVE:  /72 (Site: Right Upper Arm, Position: Sitting, Cuff Size: Medium Adult)   Pulse 84   Ht 5' 5\" (1.651 m)   Wt 215 lb (97.5 kg)   SpO2 98%   BMI 35.78 kg/m²   GEN:  WDWN, NAD  HEENT:  NCAT, TM/OP nl, PERRL, EOMI. NECK:  Supple without adenopathy. CV:  Regular rate and rhythm, S1 and S2 normal, no murmurs, clicks, gallops or rubs. No edema. PULM:  Chest is clear, no wheezing or rales. Normal symmetric air entry throughout both lung fields. ABD: soft, Non-tender, non-distended, normal bowel sounds. No organomegaly  EXT: 2+ pitting edema in left ankle and lower leg to upper shin. Redness to lower shin. Tender to palpation. ASSESSMENT/PLAN:  1.  Uncontrolled type 2 diabetes mellitus with complication, with long-term current use of insulin ([address]  Uncontrolled  Discussed insulin dosage changes per endo  Advised to increase activity as well  -  DIABETES FOOT EXAM    2. Benign essential HTN  Stable on current meds    3. Mixed hyperlipidemia  Continue statin    4. Coronary artery calcification seen on CAT scan    5. Encounter for abdominal aortic aneurysm (AAA) screening  - US SCREENING FOR AAA; Future    6. Left leg swelling  Will get US stat to r/o DVT  Treat as cellulitis if US is neg  - VL Extremity Venous Left; Future      Discussed the importance of a low carb diet with regular cardiovascular exercise. Patient was given educational handouts regarding proper low carb/low calorie diet.

## 2021-04-05 NOTE — TELEPHONE ENCOUNTER
[facility] Returned patients call. Patient stated he is having pain and swelling in his leg in between the knee and the ankle. He denies any redness. He feels a large knot right under his knee. He does not remember any injurys and does not have a history of blood clots. Advised patient to contact his PCP today to see if he can get in to be evaluated. Appt scheduled.

## 2021-04-06 ENCOUNTER — HOSPITAL ENCOUNTER (OUTPATIENT)
Dept: VASCULAR LAB | Age: 71
Discharge: HOME | End: 2021-04-06
Payer: COMMERCIAL

## 2021-04-06 ENCOUNTER — TELEPHONE (OUTPATIENT)
Dept: FAMILY MEDICINE CLINIC | Age: 71
End: 2021-04-06

## 2021-04-06 DIAGNOSIS — M79.89 LEFT LEG SWELLING: ICD-10-CM

## 2021-04-06 PROCEDURE — 93971 EXTREMITY STUDY: CPT

## 2021-04-06 RX ORDER — DOXYCYCLINE HYCLATE 100 MG/1
100 CAPSULE ORAL 2 TIMES DAILY
Qty: 20 CAPSULE | Refills: 0 | Status: SHIPPED | OUTPATIENT
Start: 2021-04-06 | End: 2021-04-16

## 2021-04-06 RX ORDER — CEPHALEXIN 500 MG/1
500 CAPSULE ORAL 3 TIMES DAILY
Qty: 21 CAPSULE | Refills: 0 | Status: SHIPPED | OUTPATIENT
Start: 2021-04-06 | End: 2021-04-13

## 2021-04-06 NOTE — TELEPHONE ENCOUNTER
Vascular lab called, Left leg negative for DVT.   Patient also stated if test was negative, Dr. Gisselle Dee phone in antibiotic

## 2021-04-28 ENCOUNTER — TELEPHONE (OUTPATIENT)
Dept: FAMILY MEDICINE CLINIC | Age: 71
End: 2021-04-28

## 2021-04-28 NOTE — TELEPHONE ENCOUNTER
Patient dropped off FMLA forms  (blank forms scanned into chart      These forms are for his health care issue of Diabetes but will be filled out for his wife so she can be off to take care of him if something happens or he needs to go to the doctors     Pt would like forms to be faxed to [phone]  And then call him so he can come  the hard copy

## 2021-06-07 ENCOUNTER — CARE COORDINATION (OUTPATIENT)
Dept: OTHER | Facility: CLINIC | Age: 71
End: 2021-06-07

## 2021-06-07 NOTE — CARE COORDINATION
Ambulatory Care Coordination Note  6/7/2021  CM Risk Score: 3  Charlson 10 Year Mortality Risk Score: 79%     ACC: Di Morales LPN       Patient has a [facility] in which he states he checks very frequently his BG. He states he is fearful of his sugar dropping too low. He began [place] recently and has since noticed a decrease in his BG especially pre-prandial- 120-130. He denies any hypoglycemic s/s. Since beginning the [place], he decreased his Lantus and Humalog doses. He was previously dosing 80 units Lantus QHS and 10 units Humalog TID. Now, he does 32 units Lantus QHS and 6-7 units Humalog TID with meals. He averages 100-130 FBS with peak afternoon 220-230. He fears taking his lunch time Humalog dose d/t lower sugars. ACM discussed taking the Humalog with lunch and checking BG 1 hour after meal. If he feels like it has not gone up much from 120's, encouraged him eat a high protein, low carb snack to avoid highs and lows. He eats 3 meals daily. He is educated on reading a nutrition label and counting his carbs/meal. He aims for 40-60 grams carbs per meal, average 150g per day. He recently started doing this after his last appointment in March 2021. He does not eat snacks, drinks occasional diet Coke. No fruit juice. He recently began to walk about 1/2 mile day, usually about 15 minutes or so. ACM educated patient to always ensure he carrys a snack with him to avoid hypoglycemic event. He states he only walks around the block in his neighborhood. PLAN:  - Smart snacking- 1-2 low carb/high protein snacks per day. - Lower carbohydrate/starch portion during meal times. - Add more healthier protein choices to meals. ACM emailed to patient:  - DM ZM  - Plate portioning  - Planning Healthy Meals  - CHO snack list    ACM to f/u after 6/21 appt.     Diabetes Assessment    Medic Alert ID: No  Meal Planning: Avoidance of concentrated sweets   How often do you test your blood sugar?: Daily, Meals, Other (Comment: test 4-6x daily, 30 minutes after meal starts)   Do you have barriers with adherence to non-pharmacologic self-management interventions? (Nutrition/Exercise/Self-Monitoring): Yes   Have you ever had to go to the ED for symptoms of low blood sugar?: No              Ambulatory Care Coordination Assessment    Care Coordination Protocol  Program Enrollment: Rising Risk  Referral from Primary Care Provider: No  Week 1 - Initial Assessment     Do you have all of your prescriptions and are they filled?: Yes  Barriers to medication adherence: None  Are you able to afford your medications?: Yes  How often do you have trouble taking your medications the way you have been told to take them?: Sometimes I take them as prescribed. Do you have Home O2 Therapy?: No      Ability to seek help/take action for Emergent Urgent situations i.e. fire, crime, inclement weather or health crisis. : Independent  Ability to ambulate to restroom: Independent  Ability handle personal hygeine needs (bathing/dressing/grooming): Independent  Ability to manage Medications: Independent  Ability to prepare Food Preparation: Independent  Ability to maintain home (clean home, laundry): Independent  Ability to drive and/or has transportation: Independent  Ability to do shopping: Independent  Ability to manage finances:  Independent  Is patient able to live independently?: Yes     Current Housing: Private Residence           Frequent urination at night?: No  Do you use rails/bars?: No  Do you have a non-slip tub mat?: Yes     Are you experiencing loss of meaning?: No  Are you experiencing loss of hope and peace?: No     Thinking about your patient's physical health needs, are there any symptoms or problems (risk indicators) you are unsure about that require further investigation?: No identified areas of uncertainly or problems already being investigated   Are the patients physical health problems impacting on their mental well-being?: No identified areas of concern   Are there any problems with your patients lifestyle behaviors (alcohol, drugs, diet, exercise) that are impacting on physical or mental well-being?: No identified areas of concern   Do you have any other concerns about your patients mental well-being? How would you rate their severity and impact on the patient?: No identified areas of concern   How would you rate their home environment in terms of safety and stability (including domestic violence, insecure housing, neighbor harassment)?: Consistently safe, supportive, stable, no identified problems   How do daily activities impact on the patient's well-being? (include current or anticipated unemployment, work, caregiving, access to transportation or other): No identified problems or perceived positive benefits   How would you rate their social network (family, work, friends)?: Good participation with social networks   How would you rate their financial resources (including ability to afford all required medical care)?: Financially secure, resources adequate, no identified problems   How wells does the patient now understand their health and well-being (symptoms, signs or risk factors) and what they need to do to manage their health?: Reasonable to good understanding and already engages in managing health or is willing to undertake better management   How well do you think your patient can engage in healthcare discussions? (Barriers include language, deafness, aphasia, alcohol or drug problems, learning difficulties, concentration): Clear and open communication, no identified barriers   Do other services need to be involved to help this patient?: Other care/services in place and adequate   Are current services involved with this patient well-coordinated? (Include coordination with other services you are now recommendation):  All required care/services in place and well-coordinated   Suggested Interventions and Community Francesca Beltran MD   AGAMATRIX PRESTO TEST strip TEST BLOOD SUGAR 8 TIMES DAILY 12/26/18  Yes Ace Wen MD   Cholecalciferol (VITAMIN D3) 1000 UNITS CAPS Take 1,000 each by mouth daily. Yes Historical Provider, MD   aspirin EC 81 MG EC tablet Take 1 tablet by mouth daily. 4/6/12  Yes Alea Sports, DO       Future Appointments   Date Time Provider Nikunj Jocelyne   6/21/2021  2:20 PM Shira Hayes MD [facility]         Staci Cap. Adryan Shannon, [address] Coordinator  Associate Care Management  [address] [place] [address]  Phone: [phone]  [email or web address]

## 2021-06-07 NOTE — CARE COORDINATION
ACC: Di Morales LPN CM Risk Score: 3  Charlson 10 Year Mortality Risk Score: 79%      ACM attempted to reach patient for follow up call regarding first be well with dm call. HIPAA compliant message left requesting a return phone call at patients convenience. Will continue to follow. Fátima Martinez, [address] [facility] Coordinator  Associate Care Management  [address] [place] [address] Street  Phone: [phone]  [email or web address]

## 2021-06-15 DIAGNOSIS — E11.9 DIABETES MELLITUS WITHOUT COMPLICATION (HCC): ICD-10-CM

## 2021-06-15 DIAGNOSIS — I10 BENIGN ESSENTIAL HTN: ICD-10-CM

## 2021-06-15 RX ORDER — OMEPRAZOLE 20 MG/1
CAPSULE, DELAYED RELEASE ORAL
Qty: 180 CAPSULE | Refills: 1 | Status: SHIPPED | OUTPATIENT
Start: 2021-06-15 | End: 2021-06-21 | Stop reason: SDUPTHER

## 2021-06-15 RX ORDER — LOSARTAN POTASSIUM 100 MG/1
TABLET ORAL
Qty: 90 TABLET | Refills: 3 | Status: SHIPPED | OUTPATIENT
Start: 2021-06-15 | End: 2021-06-21 | Stop reason: SDUPTHER

## 2021-06-21 ENCOUNTER — OFFICE VISIT (OUTPATIENT)
Dept: ENDOCRINOLOGY | Age: 71
End: 2021-06-21
Payer: COMMERCIAL

## 2021-06-21 VITALS
BODY MASS INDEX: 34.99 KG/M2 | RESPIRATION RATE: 16 BRPM | HEIGHT: 65 IN | HEART RATE: 89 BPM | WEIGHT: 210 LBS | DIASTOLIC BLOOD PRESSURE: 80 MMHG | SYSTOLIC BLOOD PRESSURE: 152 MMHG

## 2021-06-21 DIAGNOSIS — I10 BENIGN ESSENTIAL HTN: ICD-10-CM

## 2021-06-21 DIAGNOSIS — E78.2 MIXED HYPERLIPIDEMIA: ICD-10-CM

## 2021-06-21 DIAGNOSIS — E11.9 DIABETES MELLITUS WITHOUT COMPLICATION (HCC): ICD-10-CM

## 2021-06-21 PROCEDURE — 99214 OFFICE O/P EST MOD 30 MIN: CPT | Performed by: INTERNAL MEDICINE

## 2021-06-21 PROCEDURE — 95251 CONT GLUC MNTR ANALYSIS I&R: CPT | Performed by: INTERNAL MEDICINE

## 2021-06-21 PROCEDURE — 3052F HG A1C>EQUAL 8.0%<EQUAL 9.0%: CPT | Performed by: INTERNAL MEDICINE

## 2021-06-21 RX ORDER — OMEPRAZOLE 40 MG/1
CAPSULE, DELAYED RELEASE ORAL
Qty: 90 CAPSULE | Refills: 1 | Status: SHIPPED | OUTPATIENT
Start: 2021-06-21 | End: 2021-07-14 | Stop reason: SDUPTHER

## 2021-06-21 RX ORDER — BLOOD-GLUCOSE METER
EACH MISCELLANEOUS
Qty: 1 KIT | Refills: 0 | Status: SHIPPED | OUTPATIENT
Start: 2021-06-21 | End: 2021-07-14

## 2021-06-21 RX ORDER — BLOOD SUGAR DIAGNOSTIC
STRIP MISCELLANEOUS
Qty: 100 EACH | Refills: 5 | Status: SHIPPED | OUTPATIENT
Start: 2021-06-21 | End: 2021-07-14

## 2021-06-21 RX ORDER — EMPAGLIFLOZIN, METFORMIN HYDROCHLORIDE 25; 1000 MG/1; MG/1
TABLET, EXTENDED RELEASE ORAL
Qty: 90 TABLET | Refills: 1 | Status: SHIPPED | OUTPATIENT
Start: 2021-06-21 | End: 2021-07-14 | Stop reason: SDUPTHER

## 2021-06-21 RX ORDER — INSULIN LISPRO 100 [IU]/ML
INJECTION, SOLUTION INTRAVENOUS; SUBCUTANEOUS
Qty: 10 PEN | Refills: 1 | Status: SHIPPED | OUTPATIENT
Start: 2021-06-21 | End: 2021-07-14 | Stop reason: SDUPTHER

## 2021-06-21 RX ORDER — PEN NEEDLE, DIABETIC 31 GX5/16"
NEEDLE, DISPOSABLE MISCELLANEOUS
Qty: 400 EACH | Refills: 1 | Status: SHIPPED | OUTPATIENT
Start: 2021-06-21 | End: 2021-07-14 | Stop reason: SDUPTHER

## 2021-06-21 RX ORDER — INSULIN GLARGINE 100 [IU]/ML
INJECTION, SOLUTION SUBCUTANEOUS
Qty: 4 VIAL | Refills: 1 | Status: SHIPPED | OUTPATIENT
Start: 2021-06-21 | End: 2021-07-14 | Stop reason: SDUPTHER

## 2021-06-21 RX ORDER — BLOOD SUGAR DIAGNOSTIC
STRIP MISCELLANEOUS
Qty: 100 EACH | Refills: 3 | Status: SHIPPED | OUTPATIENT
Start: 2021-06-21 | End: 2021-07-14 | Stop reason: SDUPTHER

## 2021-06-21 RX ORDER — AMLODIPINE BESYLATE 5 MG/1
TABLET ORAL
Qty: 90 TABLET | Refills: 1 | Status: SHIPPED | OUTPATIENT
Start: 2021-06-21 | End: 2021-07-14 | Stop reason: SDUPTHER

## 2021-06-21 RX ORDER — ATORVASTATIN CALCIUM 20 MG/1
TABLET, FILM COATED ORAL
Qty: 90 TABLET | Refills: 1 | Status: SHIPPED | OUTPATIENT
Start: 2021-06-21 | End: 2021-07-14 | Stop reason: SDUPTHER

## 2021-06-21 RX ORDER — FLASH GLUCOSE SENSOR
KIT MISCELLANEOUS
Qty: 6 EACH | Refills: 2 | Status: SHIPPED | OUTPATIENT
Start: 2021-06-21 | End: 2021-07-14 | Stop reason: SDUPTHER

## 2021-06-21 RX ORDER — LOSARTAN POTASSIUM 100 MG/1
TABLET ORAL
Qty: 90 TABLET | Refills: 1 | Status: SHIPPED | OUTPATIENT
Start: 2021-06-21 | End: 2021-07-14 | Stop reason: SDUPTHER

## 2021-06-21 NOTE — PROGRESS NOTES
Thania Bowman is a 69 y.o. male who is here for a follow-up for management of uncontrolled DM. Patient has a PMH of Type 2 DM, hypertension, hyperlipidemia , melanoma ( managed by Dr. Candy Archer), obesity. Eye surgery ( 10/.20 )     Diagnosed with Diabetes Mellitus type 2 since the age of 38 yrs,  Course has been variable . Microvascular complications: No known retinopathy (Last eye exam: 4/18), No nephropathy . No Peripheral neuropathy  No Macrovascular complications. Had a normal CTA coronary arteries in 11/2013. Home regimen:  Lantus 34 units in A.M   Humalog 6-7 units with meals. Synjardy  mg daily. Previous meds : Unable to tolerate victoza. Trulicity ( nausea)Xigduo 5-500 mg daily. Using freestyle danyell  Hypoglycemia : No significant episodes recently. Has Hypoglycemia awareness. Diet:. Eats breakfast at noon, lunch at 5 p.m and dinner at 9 P.M  Had Nutrition education:  No planned exercise. Patient also has hyperlipidemia and is on Lipitor 20 mg daily. Tolerating without side effects    He carries the diagnosis of  HTN for many yrs  Was on Exforge 5-160 mg daily. Now taking losartan 100 mg daily, amlodipine 5 mg daily.   Tolerating without side effects    INTERIM    He has been losing weight with life style changes---He has been eating more protein and exercising daily since he got an apple watch     No Known Allergies  Outpatient Medications Marked as Taking for the 6/21/21 encounter (Office Visit) with Kian Bravo MD   Medication Sig Dispense Refill    omeprazole (PRILOSEC) 20 MG delayed release capsule TAKE 1 CAPSULE BY MOUTH 2 TIMES A  capsule 1    losartan (COZAAR) 100 MG tablet TAKE 1 TABLET BY MOUTH ONE TIME A DAY 90 tablet 3    TRUEPLUS INSULIN SYRINGE 31G X 5/16\" 1 ML MISC USE DAILY 100 each 3    TRUEPLUS PEN NEEDLES 31G X 8 MM MISC USE WITH HUMALOG AND VICTOZA FOUR TIMES A  each 5    albuterol sulfate  (90 Base) MCG/ACT inhaler 2 puffs every 6 hours as needed for shortness of breath or wheezing 1 Inhaler 3    SYNJARDY XR  MG TB24 TAKE 1 TABLET BY MOUTH ONE TIME A DAY 90 tablet 3    insulin lispro, 1 Unit Dial, (HUMALOG KWIKPEN) 100 UNIT/ML SOPN INJECT 10 UNITS UNDER THE SKIN THREE TIMES A DAY BEFORE MEALS 30 mL 3    insulin glargine (LANTUS) 100 UNIT/ML injection vial Inject 34 Units into the skin every morning (Patient taking differently: Inject 32 Units into the skin every morning ) 4 vial 2    amLODIPine (NORVASC) 5 MG tablet TAKE 1 TABLET BY MOUTH ONE TIME A DAY 90 tablet 3    atorvastatin (LIPITOR) 20 MG tablet TAKE 1 TABLET BY MOUTH ONE TIME A DAY 90 tablet 3    Continuous Blood Gluc Sensor (FREESTYLE LA 14 DAY SENSOR) MISC USE ONE UNDER THE SKIN EVERY 14 DAYS 6 each 5    Continuous Blood Gluc  (FREESTYLE LA 14 DAY READER) DORA 1 each by Does not apply route daily 1 Device 0    Cholecalciferol (VITAMIN D3) 1000 UNITS CAPS Take 1,000 each by mouth daily.  aspirin EC 81 MG EC tablet Take 1 tablet by mouth daily. 30 tablet 11     Body mass index is 34.95 kg/m².      Wt Readings from Last 3 Encounters:   06/21/21 210 lb (95.3 kg)   04/05/21 215 lb (97.5 kg)   06/11/20 214 lb 9.6 oz (97.3 kg)     BP Readings from Last 3 Encounters:   06/21/21 (!) 152/80   04/05/21 132/72   06/11/20 131/76        Past Medical History:   Diagnosis Date    Asthma     childhood     CAD (coronary artery disease)     Coronary artery calcification seen on CAT scan     GERD (gastroesophageal reflux disease)     Hyperlipidemia     Hypertension     Melanoma in situ ([address]     Melanoma in situ of skin of buttock ([address] 11/2016    RT abdomen    Moderate single current episode of major depressive disorder ([address] 7/6/2017    Obesity     Osteoarthritis     Proteinuria     Salcedo Hunt syndrome 1999    Rosacea     Stricture, esophagus     Type II or unspecified type diabetes mellitus without mention of complication, not stated as uncontrolled     Neurological: normal coordination, normal general cortical function    Lab Results   Component Value Date    LABA1C 8.7 03/02/2021     Lab Results   Component Value Date    .0 03/02/2021       Assessment/Plan      --- Uncontrolled Type 2 DM     Cheng Castellon is a 69 y.o. male has Type 2 DM with obesity and insulin resistance. Uncontrolled. Aic 7.8   Uing freestyle danyell, but does not scan has often previously had phobia of low sugars. Now Less concerned about hypoglycemia. -on  lantus 32 units QAM---reduce to 30 units   -Use Humalog 7-8 units with meals + SSI  --advised to take meal boluses 10 min before eating and increase the dose with emals    -ContinueSynjardy to  mg daily. Advised to reduce carb intake. Health maintenance     Advised follow-up with the ophthalmologist once a year. last one was oct 2020    Last urine microalbumin/cr ratio high in march 2021 pt was told . On losartan elevated    Discussed foot care. Pt on ASA. Former smoker. 2. Hypertension. BP not  at goal.   Denies any chest pain ,Sob or headache    Check at home     3. Hyperlipidemia. On statins. Labs in 07/18--> 01/19---> 03/19--> 02/20      4. Melanoma S/p surgery. Diabetes Continuous Glucose Monitoring Report         Reason for Study:     - improve diabetic control without risk of hypoglycemia       Current Medication regimen:   lantus 32 units        CGMS Report     CGMS data collection was performed on June 21, 2021   Patient provided information on his  diet, activities and insulin dosing  during this period. Data was available for 14  days     Sensor Data Report:   - 12 AM to 6 AM: Overnight blood glucose pattern shows stable glycemia   - 6   AM to 10 AM:  Post breakfast hyperglycemia  is noted  --10AM to 5 PM : no   hypoglycemia observed during this time.   - 5   PM to 8 PM: Post meal  is noted       Average reading  158mg/dL     Standard Dev 27    mg/dL   % of time <70 mg/dL 0 %   % of time >180  mg/dL 29%   % of time within range  71 %  Number of hypoglycemia episodes noted: 0     Impression:   CGMS shows stable glycemia overnite      Recommendation:      Patient was advised to make the following changes in his diabetic regimen  Advised to start taking meal boluses 10 minutes before eating   Reduce lantus 30 units

## 2021-06-22 ENCOUNTER — CARE COORDINATION (OUTPATIENT)
Dept: OTHER | Facility: CLINIC | Age: 71
End: 2021-06-22

## 2021-07-14 ENCOUNTER — TELEPHONE (OUTPATIENT)
Dept: ENDOCRINOLOGY | Age: 71
End: 2021-07-14

## 2021-07-14 DIAGNOSIS — I10 BENIGN ESSENTIAL HTN: ICD-10-CM

## 2021-07-14 DIAGNOSIS — E11.9 DIABETES MELLITUS WITHOUT COMPLICATION (HCC): ICD-10-CM

## 2021-07-14 DIAGNOSIS — E78.2 MIXED HYPERLIPIDEMIA: ICD-10-CM

## 2021-07-14 RX ORDER — ATORVASTATIN CALCIUM 20 MG/1
TABLET, FILM COATED ORAL
Qty: 90 TABLET | Refills: 1 | Status: SHIPPED | OUTPATIENT
Start: 2021-07-14 | End: 2022-02-24

## 2021-07-14 RX ORDER — INSULIN GLARGINE 100 [IU]/ML
INJECTION, SOLUTION SUBCUTANEOUS
Qty: 4 VIAL | Refills: 1 | Status: SHIPPED | OUTPATIENT
Start: 2021-07-14 | End: 2022-02-24

## 2021-07-14 RX ORDER — BLOOD SUGAR DIAGNOSTIC
STRIP MISCELLANEOUS
Qty: 100 EACH | Refills: 3 | Status: SHIPPED | OUTPATIENT
Start: 2021-07-14 | End: 2022-08-03

## 2021-07-14 RX ORDER — AMLODIPINE BESYLATE 5 MG/1
TABLET ORAL
Qty: 90 TABLET | Refills: 1 | Status: SHIPPED | OUTPATIENT
Start: 2021-07-14 | End: 2022-02-24

## 2021-07-14 RX ORDER — FLASH GLUCOSE SENSOR
KIT MISCELLANEOUS
Qty: 6 EACH | Refills: 2 | Status: SHIPPED | OUTPATIENT
Start: 2021-07-14 | End: 2022-01-11

## 2021-07-14 RX ORDER — EMPAGLIFLOZIN, METFORMIN HYDROCHLORIDE 25; 1000 MG/1; MG/1
TABLET, EXTENDED RELEASE ORAL
Qty: 90 TABLET | Refills: 1 | Status: SHIPPED | OUTPATIENT
Start: 2021-07-14 | End: 2022-02-24

## 2021-07-14 RX ORDER — PEN NEEDLE, DIABETIC 31 GX5/16"
NEEDLE, DISPOSABLE MISCELLANEOUS
Qty: 400 EACH | Refills: 1 | Status: SHIPPED | OUTPATIENT
Start: 2021-07-14 | End: 2022-05-12

## 2021-07-14 RX ORDER — INSULIN LISPRO 100 [IU]/ML
INJECTION, SOLUTION INTRAVENOUS; SUBCUTANEOUS
Qty: 10 PEN | Refills: 1 | Status: SHIPPED | OUTPATIENT
Start: 2021-07-14 | End: 2022-02-24

## 2021-07-14 RX ORDER — LOSARTAN POTASSIUM 100 MG/1
TABLET ORAL
Qty: 90 TABLET | Refills: 1 | Status: SHIPPED | OUTPATIENT
Start: 2021-07-14 | End: 2022-02-24

## 2021-07-14 RX ORDER — OMEPRAZOLE 40 MG/1
CAPSULE, DELAYED RELEASE ORAL
Qty: 90 CAPSULE | Refills: 1 | Status: SHIPPED | OUTPATIENT
Start: 2021-07-14 | End: 2022-02-24

## 2021-07-14 NOTE — TELEPHONE ENCOUNTER
Spoke to patient and he needs all of his prescriptions sent to the [facility] for 90 day supply.

## 2021-07-20 ENCOUNTER — CARE COORDINATION (OUTPATIENT)
Dept: OTHER | Facility: CLINIC | Age: 71
End: 2021-07-20

## 2021-07-20 NOTE — CARE COORDINATION
Ambulatory Care Coordination Note  7/20/2021  CM Risk Score: 3  Charlson 10 Year Mortality Risk Score: 79%     ACC: Liana Hebert LPN      Patient reports his FBS numbers have decreased slightly since our last out reach. Now ranging from . Afternoons still running higher, 200-220. He states seldom do they stay below 200 in the afternoon. He states he eats 3 meals daily, usually similar foods, lower carbs and high protein. He has been increasing his activity level by walking around the block QAM, since has noticed the drop in FBS #'s. He plans to continue this- ACM encouraged him to do so. He voiced concerns regarding his PM sugars. AT times, they can drop from 220-180-140 within an hour. He feels as giving insulin will drop him too low. AC reminded patient of the rule of 15 and s/s hypoglycemia. He will continue to take his insulin as prescribed if he is not having any s/s hypoglycemia. Care Coordination Interventions    Program Enrollment: Rising Risk  Referral from Primary Care Provider: No  Suggested Interventions and Community Resources  Diabetes Education: In Process (Comment: [facility] DM education)  Disease Specific Clinic: Completed (Comment: Osmani Peñaloza MD- ENDO)  Pharmacist: Completed (Comment: [facility] Team)  Zone Management Tools: Completed (Comment: DM zone management emailed to patient)         Goals Addressed                 This Visit's Progress     Self Monitoring   Improving     Self-Monitored Blood Glucose - I will notify my provider of any trends of increasing or decreasing blood sugars over a 1 month period. I will notify my provider if I have any blood sugar readings less than 70 more than 2 times a month. Barriers: overwhelmed by complexity of regimen  Plan for overcoming my barriers: I will utilize the resources provided to me by my ACM and notify my PCP or specialists or any new or worsening changes.    Confidence: 9/10  Anticipated Goal Completion Date: 6/22/21              Prior to Admission medications    Medication Sig Start Date End Date Taking? Authorizing Provider   Continuous Blood Gluc Sensor (FREESTYLE LA 14 DAY SENSOR) MISC USE ONE UNDER THE SKIN EVERY 14 DAYS 7/14/21   Trevor Thrasher MD   insulin lispro, 1 Unit Dial, (HUMALOG KWIKPEN) 100 UNIT/ML SOPN INJECT 10 UNITS UNDER THE SKIN THREE TIMES A DAY BEFORE MEALS 7/14/21   Trevor Thrasher MD   insulin glargine (LANTUS) 100 UNIT/ML injection vial INJECT 34 UNITS INTO THE SKIN DAILY. Patient taking differently: INJECT 32 UNITS INTO THE SKIN DAILY. 7/14/21   Trevor Thrasher MD   Insulin Pen Needle (TRUEPLUS PEN NEEDLES) 31G X 8 MM MISC USE TO INJECT INSULIN 4 TIMES DAILY. 7/14/21   Trevor Thrasher MD   Insulin Syringe-Needle U-100 (TRUEPLUS INSULIN SYRINGE) 31G X 5/16\" 1 ML MISC USE DAILY 7/14/21   Trevor Thrasher MD   losartan (COZAAR) 100 MG tablet TAKE 1 TABLET BY MOUTH ONE TIME A DAY 7/14/21   Trevor Thrasher MD   Empagliflozin-metFORMIN HCl ER (SYNJARDY XR)  MG TB24 TAKE 1 TABLET BY MOUTH ONE TIME A DAY 7/14/21   Trevor Thrasher MD   atorvastatin (LIPITOR) 20 MG tablet TAKE 1 TABLET BY MOUTH ONE TIME A DAY 7/14/21   Trevor Thrasher MD   amLODIPine (NORVASC) 5 MG tablet TAKE 1 TABLET BY MOUTH ONE TIME A DAY 7/14/21   Trevor Thrasher MD   omeprazole (PRILOSEC) 40 MG delayed release capsule TAKE 1 CAPSULE BY MOUTH DAILY 7/14/21   Trevor Thrasher MD   albuterol sulfate  (90 Base) MCG/ACT inhaler 2 puffs every 6 hours as needed for shortness of breath or wheezing 2/16/21   Shalini Giordano MD   Continuous Blood Gluc  (FREESTYLE LA 14 DAY READER) DORA 1 each by Does not apply route daily 1/30/19   Mary Ireland MD   Cholecalciferol (VITAMIN D3) 1000 UNITS CAPS Take 1,000 each by mouth daily. Historical Provider, MD   aspirin EC 81 MG EC tablet Take 1 tablet by mouth daily.  4/6/12   Frankie Hong, DO       Future Appointments   Date Time Provider Nikunj Casanova   10/4/2021  2:20 PM Trevor Thrasher MD Oma Varela, [address] Coordinator  Associate Care Management  [address] [place] [address], [address]  Phone: [phone]  [email or web address]

## 2021-08-11 ENCOUNTER — TELEPHONE (OUTPATIENT)
Dept: PHARMACY | Facility: CLINIC | Age: 71
End: 2021-08-11

## 2021-08-11 NOTE — TELEPHONE ENCOUNTER
As of 8/5/21 patient has used $510 of the maximum of $600 a year in waived co pays for specific medications and pharmacy-related supplies through the [address] for the DM Program.    Patient currently enrolled in the DM Program and is nearing the maximum of $600 a year in waived co pays for specific medications and pharmacy-related supplies through the [address]. Courtesy call placed to patient at mobile number to advise them of the above information. Patient unavailable at the time of call. Message left on mobile TAD: Maximum waived co pays for the DM Program has almost been met. Once maximum has been reached, they will begin to be charged their co-payment once again. I left our number: [phone], option #3 if patient has any questions/concerns.       Brock Selby[address]   Phone: [phone], option #3

## 2021-08-24 ENCOUNTER — CARE COORDINATION (OUTPATIENT)
Dept: OTHER | Facility: CLINIC | Age: 71
End: 2021-08-24

## 2021-08-24 NOTE — CARE COORDINATION
Ambulatory Care Coordination Note  8/24/2021  CM Risk Score: 3  Charlson 10 Year Mortality Risk Score: 79%     ACC: Serge Eye, LPN    Patient states he has been doing well, BG numbers still going up and down, but better than last month. Most BG are below 200, admits sometimes a few are just above 200. Denies any hypo/hyperglycemic episodes or s/s. Patient states he is still walking- daily got to be too much, now walking QOD. Doing well with this. Patient declines any recipes or resources at the moment, will let me know if he changes his mind. ACM to f/u with patient early October after his A1C. Care Coordination Interventions    Program Enrollment: Rising Risk  Referral from Primary Care Provider: No  Suggested Interventions and Community Resources  Diabetes Education: In Process (Comment: ACM DM education)  Disease Specific Clinic: Completed (Comment: Jean-Pierre Miranda MD- ENDO)  Pharmacist: Completed (Comment: [facility] Team)  Zone Management Tools: Completed (Comment: DM zone management emailed to patient)         Goals Addressed                 This Visit's Progress     Self Monitoring   Improving     Self-Monitored Blood Glucose - I will notify my provider of any trends of increasing or decreasing blood sugars over a 1 month period. I will notify my provider if I have any blood sugar readings less than 70 more than 2 times a month. Barriers: overwhelmed by complexity of regimen  Plan for overcoming my barriers: I will utilize the resources provided to me by my ACM and notify my PCP or specialists or any new or worsening changes. Confidence: 9/10  Anticipated Goal Completion Date: 6/22/21              Prior to Admission medications    Medication Sig Start Date End Date Taking?  Authorizing Provider   Continuous Blood Gluc Sensor (FREESTYLE LA 14 DAY SENSOR) MISC USE ONE UNDER THE SKIN EVERY 14 DAYS 7/14/21   Jean-Pierre Miranda MD   insulin lispro, 1 Unit Dial, (Lewisy Shahram) 100 UNIT/ML SOPN INJECT 10 UNITS UNDER THE SKIN THREE TIMES A DAY BEFORE MEALS 7/14/21   Castillo Gonzales MD   insulin glargine (LANTUS) 100 UNIT/ML injection vial INJECT 34 UNITS INTO THE SKIN DAILY. Patient taking differently: INJECT 32 UNITS INTO THE SKIN DAILY. 7/14/21   Castillo Gonzales MD   Insulin Pen Needle (TRUEPLUS PEN NEEDLES) 31G X 8 MM MISC USE TO INJECT INSULIN 4 TIMES DAILY. 7/14/21   Castillo Gonzales MD   Insulin Syringe-Needle U-100 (TRUEPLUS INSULIN SYRINGE) 31G X 5/16\" 1 ML MISC USE DAILY 7/14/21   Castillo Gonzales MD   losartan (COZAAR) 100 MG tablet TAKE 1 TABLET BY MOUTH ONE TIME A DAY 7/14/21   Castillo Gonzales MD   Empagliflozin-metFORMIN HCl ER (SYNJARDY XR)  MG TB24 TAKE 1 TABLET BY MOUTH ONE TIME A DAY 7/14/21   Castillo Gonzales MD   atorvastatin (LIPITOR) 20 MG tablet TAKE 1 TABLET BY MOUTH ONE TIME A DAY 7/14/21   Castillo Gonzales MD   amLODIPine (NORVASC) 5 MG tablet TAKE 1 TABLET BY MOUTH ONE TIME A DAY 7/14/21   Castillo Gonzales MD   omeprazole (PRILOSEC) 40 MG delayed release capsule TAKE 1 CAPSULE BY MOUTH DAILY 7/14/21   Castillo Gonzales MD   albuterol sulfate  (90 Base) MCG/ACT inhaler 2 puffs every 6 hours as needed for shortness of breath or wheezing 2/16/21   No Rivera MD   Continuous Blood Gluc  (FREESTYLE LA 14 DAY READER) DORA 1 each by Does not apply route daily 1/30/19   Gabino Law MD   Cholecalciferol (VITAMIN D3) 1000 UNITS CAPS Take 1,000 each by mouth daily. Historical Provider, MD   aspirin EC 81 MG EC tablet Take 1 tablet by mouth daily. 4/6/12   Farzad Telles, DO       Future Appointments   Date Time Provider Nikunj Casanova   10/4/2021  2:20 PM MD Oma Chi. Cristina Smalls, [address] Coordinator  Associate Care Management  [address] [place] [address], [address] [place] [address] Street  Phone: [phone]  [email or web address]

## 2021-09-03 ENCOUNTER — TELEPHONE (OUTPATIENT)
Dept: FAMILY MEDICINE CLINIC | Age: 71
End: 2021-09-03

## 2021-09-14 ENCOUNTER — TELEPHONE (OUTPATIENT)
Dept: FAMILY MEDICINE CLINIC | Age: 71
End: 2021-09-14

## 2021-09-14 NOTE — TELEPHONE ENCOUNTER
Patient called back and I informed him that the labs that were drawn this morning will be able to be connected to his be well thaat he has coming up.

## 2021-09-14 NOTE — TELEPHONE ENCOUNTER
Tried calling wife, phone goes silents and disconnects    Labs may be the same, labs ordered in march will give the results needed

## 2021-09-14 NOTE — TELEPHONE ENCOUNTER
Patient's wife calling in regards to her . He went to the lab to get blood work done for his be well visit. Patient's wife stated that they ismael is blood from an old order from a previous doctor. Patient's wife stated he needs this done by 09/17/2021. Is there anyway he can use the blood the already ismael from today and test it with the new orders? Please advise.

## 2021-09-16 ENCOUNTER — OFFICE VISIT (OUTPATIENT)
Dept: FAMILY MEDICINE CLINIC | Age: 71
End: 2021-09-16
Payer: COMMERCIAL

## 2021-09-16 VITALS
HEART RATE: 78 BPM | OXYGEN SATURATION: 98 % | SYSTOLIC BLOOD PRESSURE: 126 MMHG | WEIGHT: 212 LBS | HEIGHT: 65 IN | BODY MASS INDEX: 35.32 KG/M2 | DIASTOLIC BLOOD PRESSURE: 74 MMHG

## 2021-09-16 DIAGNOSIS — E78.2 MIXED HYPERLIPIDEMIA: ICD-10-CM

## 2021-09-16 DIAGNOSIS — Z00.00 ANNUAL PHYSICAL EXAM: Primary | ICD-10-CM

## 2021-09-16 DIAGNOSIS — I10 BENIGN ESSENTIAL HTN: ICD-10-CM

## 2021-09-16 DIAGNOSIS — F32.1 MODERATE SINGLE CURRENT EPISODE OF MAJOR DEPRESSIVE DISORDER (HCC): ICD-10-CM

## 2021-09-16 PROCEDURE — 90694 VACC AIIV4 NO PRSRV 0.5ML IM: CPT | Performed by: FAMILY MEDICINE

## 2021-09-16 PROCEDURE — 90471 IMMUNIZATION ADMIN: CPT | Performed by: FAMILY MEDICINE

## 2021-09-16 PROCEDURE — 99397 PER PM REEVAL EST PAT 65+ YR: CPT | Performed by: FAMILY MEDICINE

## 2021-09-16 PROCEDURE — 3052F HG A1C>EQUAL 8.0%<EQUAL 9.0%: CPT | Performed by: FAMILY MEDICINE

## 2021-09-16 PROCEDURE — 99213 OFFICE O/P EST LOW 20 MIN: CPT | Performed by: FAMILY MEDICINE

## 2021-09-16 SDOH — ECONOMIC STABILITY: FOOD INSECURITY: WITHIN THE PAST 12 MONTHS, THE FOOD YOU BOUGHT JUST DIDN'T LAST AND YOU DIDN'T HAVE MONEY TO GET MORE.: NEVER TRUE

## 2021-09-16 SDOH — ECONOMIC STABILITY: FOOD INSECURITY: WITHIN THE PAST 12 MONTHS, YOU WORRIED THAT YOUR FOOD WOULD RUN OUT BEFORE YOU GOT MONEY TO BUY MORE.: NEVER TRUE

## 2021-09-16 ASSESSMENT — SOCIAL DETERMINANTS OF HEALTH (SDOH): HOW HARD IS IT FOR YOU TO PAY FOR THE VERY BASICS LIKE FOOD, HOUSING, MEDICAL CARE, AND HEATING?: NOT HARD AT ALL

## 2021-09-16 NOTE — PROGRESS NOTES
SUBJECTIVE:   Dat Upton is a 66 y.o. male presenting for his annual checkup. HPI:   Diabetes Mellitus Type II, Follow-up--   Lab Results   Component Value Date    LABA1C 8.3 09/14/2021      Lab Results   Component Value Date    .5 09/14/2021     Checks sugars at home:Yes. have been high lately in the AM.  Fasting glucose running , postprandial sugars are elevated up to 250. Feels he eats low carb diet. Pt is on ACEI or ARB. Pt denies foot ulcerations, paresthesia of the feet and visual disturbances. Insulin Treated? Yes. Follows w endo. Has appt in 2 weeks. Is taking lantus 30 units + is supposed to be taking humalog 7-8 units  w meals +SSI. States he uses 5-6 units w meals. HTN--Pt seen here for follow up regarding HTN. BP checks at home:No   Pt denies blurred vision, palpitations and peripheral edema. Pt complains of none. Tolerating medications: Yes. Pt does exercise regularly and does adhere to a low salt and low fat diet. Hyperlipidemia:    Lab Results   Component Value Date    LDLCALC 64 09/14/2021   . He does not have myalgias from medication. Pt is  following Lifestyle modification including Low fat/low cholesterol diet, low carbohydrate diet, and  does exercise regularly. Starting walking 1/4m mile daily.          Patient Active Problem List   Diagnosis    Hyperlipidemia    Uncontrolled type 2 diabetes mellitus with complication, with long-term current use of insulin ([address]    Vitamin D deficiency    Benign essential HTN    Diabetes mellitus without complication (HCC)    GERD (gastroesophageal reflux disease)    Osteoarthritis    Proteinuria    Rosacea    Asthma    Coronary artery calcification seen on CAT scan    Morbidly obese (HCC)    Moderate single current episode of major depressive disorder ([address]       Past Medical History:   Diagnosis Date    Asthma     childhood     CAD (coronary artery disease)     Coronary artery calcification seen on CAT scan  GERD (gastroesophageal reflux disease)     Hyperlipidemia     Hypertension     [facility]     Melanoma in situ of skin of buttock ([facility] 2016    RT abdomen    Moderate single current episode of major depressive disorder ([facility] 2017    Obesity     Osteoarthritis     Proteinuria     Salcedo Hunt syndrome     Rosacea     Stricture, esophagus     Type II or unspecified type diabetes mellitus without mention of complication, not stated as uncontrolled     Vitamin D deficiency        Past Surgical History:   Procedure Laterality Date    CATARACT REMOVAL Right 10/2020    CHOLECYSTECTOMY      COLONOSCOPY      TOE AMPUTATION Left     lawnmower accident- 2 toes    UPPER GASTROINTESTINAL ENDOSCOPY      VASECTOMY         Social History     Socioeconomic History    Marital status:      Spouse name: Not on file    Number of children: Not on file    Years of education: Not on file    Highest education level: Not on file   Occupational History    Occupation: retired    Tobacco Use    Smoking status: Former Smoker     Packs/day: 2.00     Years: 15.00     Pack years: 30.00     Types: Cigarettes     Quit date:      Years since quittin.7    Smokeless tobacco: Never Used    Tobacco comment: quit    Vaping Use    Vaping Use: Never used   Substance and Sexual Activity    Alcohol use: Yes     Comment: rarely    Drug use: No    Sexual activity: Yes     Partners: Female   Other Topics Concern    Not on file   Social History Narrative    Using [employer] and test to ensure he is accurate for his dosing      Social Determinants of Health     Financial Resource Strain: Low Risk     Difficulty of Paying Living Expenses: Not hard at all   Food Insecurity: No Food Insecurity    Worried About Running Out of Food in the Last Year: Never true    Radha of Food in the Last Year: Never true   Transportation Needs:     Lack of Transportation (Medical):  Lack of Transportation (Non-Medical):    Physical Activity:     Days of Exercise per Week:     Minutes of Exercise per Session:    Stress:     Feeling of Stress :    Social Connections:     Frequency of Communication with Friends and Family:     Frequency of Social Gatherings with Friends and Family:     Attends Church Services:     Active Member of Clubs or Organizations:     Attends Club or Organization Meetings:     Marital Status:    Intimate Partner Violence:     Fear of Current or Ex-Partner:     Emotionally Abused:     Physically Abused:     Sexually Abused:        Family History   Problem Relation Age of Onset    Cancer Father         kidney    Diabetes Father     Kidney Disease Father     Diabetes Sister     Cancer Paternal Grandfather         melanoma       Current Outpatient Medications   Medication Sig Dispense Refill    Continuous Blood Gluc Sensor (FREESTYLE LA 14 DAY SENSOR) MISC USE ONE UNDER THE SKIN EVERY 14 DAYS 6 each 2    insulin lispro, 1 Unit Dial, (HUMALOG KWIKPEN) 100 UNIT/ML SOPN INJECT 10 UNITS UNDER THE SKIN THREE TIMES A DAY BEFORE MEALS 10 pen 1    insulin glargine (LANTUS) 100 UNIT/ML injection vial INJECT 34 UNITS INTO THE SKIN DAILY. (Patient taking differently: INJECT 32 UNITS INTO THE SKIN DAILY. ) 4 vial 1    Insulin Pen Needle (TRUEPLUS PEN NEEDLES) 31G X 8 MM MISC USE TO INJECT INSULIN 4 TIMES DAILY.  400 each 1    Insulin Syringe-Needle U-100 (TRUEPLUS INSULIN SYRINGE) 31G X 5/16\" 1 ML MISC USE DAILY 100 each 3    losartan (COZAAR) 100 MG tablet TAKE 1 TABLET BY MOUTH ONE TIME A DAY 90 tablet 1    Empagliflozin-metFORMIN HCl ER (SYNJARDY XR)  MG TB24 TAKE 1 TABLET BY MOUTH ONE TIME A DAY 90 tablet 1    atorvastatin (LIPITOR) 20 MG tablet TAKE 1 TABLET BY MOUTH ONE TIME A DAY 90 tablet 1    amLODIPine (NORVASC) 5 MG tablet TAKE 1 TABLET BY MOUTH ONE TIME A DAY 90 tablet 1    omeprazole (PRILOSEC) 40 MG delayed headaches  Hematologic/Lymphatic/Immunologic: no abnormal bleeding/bruising, fever, chills, night sweats, swollen glands, or unexplained weight loss  Endocrine: no heat or cold intolerance and no polyphagia, polydipsia, or polyuria    OBJECTIVE:   /74 (Site: Right Upper Arm, Position: Sitting, Cuff Size: Medium Adult)   Pulse 78   Ht 5' 5\" (1.651 m)   Wt 212 lb (96.2 kg)   SpO2 98%   BMI 35.28 kg/m²   General appearance: healthy, alert, no distress  Skin: Skin color, texture, turgor normal. No rashes or suspicious lesions. No induration or tightening palpated. Head: Normocephalic. No masses, lesions, tenderness or abnormalities  Eyes: Conjunctivae/corneas clear. PERRL, EOM's intact. Ears: External ears normal. Hearing grossly normal.  Nose/Sinuses: Nares normal.   Oropharynx: Lips, mucosa, and tongue normal. Teeth and gums normal. Oropharynx clear with no exudate seen. Neck: Neck supple, and symmetric. No adenopathy. Thyroid symmetric, normal size, without nodule. Trachea is midline. Back: Back symmetric, no curvature. ROM normal. No CVA tenderness. Lungs: Good diaphragmatic excursion. Lungs clear to auscultation bilaterally. No retractions or use of accessory muscles. Heart: PMI is not displaced, and no thrill noted. Regular rate and rhythm, with no rub, murmur or gallop noted. Abdomen: Abdomen soft, non-tender. BS normal. No masses, organomegaly. No hernia noted. Extremities: Extremities normal. No deformities, edema, or skin discoloration. No cyanosis or clubbing noted to the nails. Hands and feet were warm and well-perfused with palpable dorsalis pedis pulses bilaterally. Lymph: No lymphadenopathy of the neck or supraclavicular regions. Musculoskeletal: Spine ROM normal. Muscular strength intact. Neuro: Cranial nerves intact, gait normal. No focal weakness. ASSESSMENT/PLAN:  1. Annual physical exam    2.  Moderate single current episode of major depressive disorder

## 2021-10-04 ENCOUNTER — OFFICE VISIT (OUTPATIENT)
Dept: ENDOCRINOLOGY | Age: 71
End: 2021-10-04
Payer: COMMERCIAL

## 2021-10-04 VITALS
WEIGHT: 209 LBS | HEART RATE: 66 BPM | SYSTOLIC BLOOD PRESSURE: 132 MMHG | RESPIRATION RATE: 16 BRPM | HEIGHT: 65 IN | DIASTOLIC BLOOD PRESSURE: 72 MMHG | BODY MASS INDEX: 34.82 KG/M2

## 2021-10-04 DIAGNOSIS — E55.9 VITAMIN D DEFICIENCY: ICD-10-CM

## 2021-10-04 DIAGNOSIS — E78.2 MIXED HYPERLIPIDEMIA: ICD-10-CM

## 2021-10-04 PROCEDURE — 95251 CONT GLUC MNTR ANALYSIS I&R: CPT | Performed by: INTERNAL MEDICINE

## 2021-10-04 PROCEDURE — 99214 OFFICE O/P EST MOD 30 MIN: CPT | Performed by: INTERNAL MEDICINE

## 2021-10-04 PROCEDURE — 3052F HG A1C>EQUAL 8.0%<EQUAL 9.0%: CPT | Performed by: INTERNAL MEDICINE

## 2021-10-04 NOTE — PROGRESS NOTES
Cristian Serrano is a 69 y.o. male who is here for a follow-up for management of uncontrolled DM. Patient has a PMH of Type 2 DM, hypertension, hyperlipidemia , melanoma ( managed by Dr. Ifrah Guzman), obesity. Eye surgery ( 10/.20 )     Diagnosed with Diabetes Mellitus type 2 since the age of 38 yrs,  Course has been variable . Microvascular complications: No known retinopathy (Last eye exam: 4/18), No nephropathy . No Peripheral neuropathy  No Macrovascular complications. Had a normal CTA coronary arteries in 11/2013. Home regimen:  Lantus 34 units in A.M   Humalog 6-7 units with meals. Synjardy  mg daily. Previous meds : Unable to tolerate victoza. Trulicity ( nausea)Xigduo 5-500 mg daily. Using freestyle danyell  Hypoglycemia : No significant episodes recently. Has Hypoglycemia awareness. Diet:. Eats breakfast at noon, lunch at 5 p.m and dinner at 9 P.M  Had Nutrition education:  No planned exercise. Patient also has hyperlipidemia and is on Lipitor 20 mg daily. Tolerating without side effects    He carries the diagnosis of  HTN for many yrs  Was on Exforge 5-160 mg daily. Now taking losartan 100 mg daily, amlodipine 5 mg daily. Tolerating without side effects    INTERIM    Patient's notes arise in glucose around noon although he claims that he does not eat anything but diets drink his coffee over those hours. His fasting glucose are at target he did not had any hypoglycemia on continuous glucose sensor    No Known Allergies  Outpatient Medications Marked as Taking for the 10/4/21 encounter (Office Visit) with Vinita Golden MD   Medication Sig Dispense Refill    Continuous Blood Gluc Sensor (FREESTYLE DANYELL 14 DAY SENSOR) [facility] USE ONE UNDER THE SKIN EVERY 14 DAYS 6 each 2    insulin lispro, 1 Unit Dial, (HUMALOG KWIKPEN) 100 UNIT/ML SOPN INJECT 10 UNITS UNDER THE SKIN THREE TIMES A DAY BEFORE MEALS 10 pen 1    insulin glargine (LANTUS) 100 UNIT/ML injection vial INJECT 34 UNITS INTO THE SKIN DAILY. (Patient taking differently: INJECT 32 UNITS INTO THE SKIN DAILY. ) 4 vial 1    Insulin Pen Needle (TRUEPLUS PEN NEEDLES) 31G X 8 MM MISC USE TO INJECT INSULIN 4 TIMES DAILY. 400 each 1    Insulin Syringe-Needle U-100 (TRUEPLUS INSULIN SYRINGE) 31G X 5/16\" 1 ML MISC USE DAILY 100 each 3    losartan (COZAAR) 100 MG tablet TAKE 1 TABLET BY MOUTH ONE TIME A DAY 90 tablet 1    Empagliflozin-metFORMIN HCl ER (SYNJARDY XR)  MG TB24 TAKE 1 TABLET BY MOUTH ONE TIME A DAY 90 tablet 1    atorvastatin (LIPITOR) 20 MG tablet TAKE 1 TABLET BY MOUTH ONE TIME A DAY 90 tablet 1    amLODIPine (NORVASC) 5 MG tablet TAKE 1 TABLET BY MOUTH ONE TIME A DAY 90 tablet 1    omeprazole (PRILOSEC) 40 MG delayed release capsule TAKE 1 CAPSULE BY MOUTH DAILY 90 capsule 1    albuterol sulfate  (90 Base) MCG/ACT inhaler 2 puffs every 6 hours as needed for shortness of breath or wheezing 1 Inhaler 3    Continuous Blood Gluc  (FREESTYLE LA 14 DAY READER) DORA 1 each by Does not apply route daily 1 Device 0    Cholecalciferol (VITAMIN D3) 1000 UNITS CAPS Take 1,000 each by mouth daily.  aspirin EC 81 MG EC tablet Take 1 tablet by mouth daily. 30 tablet 11     Body mass index is 34.78 kg/m².      Wt Readings from Last 3 Encounters:   10/04/21 209 lb (94.8 kg)   09/16/21 212 lb (96.2 kg)   06/21/21 210 lb (95.3 kg)     BP Readings from Last 3 Encounters:   10/04/21 132/72   09/16/21 126/74   06/21/21 (!) 152/80        Past Medical History:   Diagnosis Date    Asthma     childhood     CAD (coronary artery disease)     Coronary artery calcification seen on CAT scan     GERD (gastroesophageal reflux disease)     Hyperlipidemia     Hypertension     Melanoma in situ ([address]     Melanoma in situ of skin of buttock ([address] 11/2016    RT abdomen    Moderate single current episode of major depressive disorder ([address] 7/6/2017    Obesity     Osteoarthritis     Proteinuria     Salcedo Hunt syndrome 1999    Rosacea  Stricture, esophagus     Type II or unspecified type diabetes mellitus without mention of complication, not stated as uncontrolled     Vitamin D deficiency      Past Surgical History:   Procedure Laterality Date    CATARACT REMOVAL Right 10/2020    CHOLECYSTECTOMY      COLONOSCOPY  2002    TOE AMPUTATION Left     lawnmower accident- 2 toes    UPPER GASTROINTESTINAL ENDOSCOPY  2002    VASECTOMY       Family History   Problem Relation Age of Onset    Cancer Father         kidney    Diabetes Father     Kidney Disease Father     Diabetes Sister     Cancer Paternal Grandfather         melanoma     Social History     Tobacco Use   Smoking Status Former Smoker    Packs/day: 2.00    Years: 15.00    Pack years: 30.00    Types: Cigarettes    Quit date:     Years since quittin.7   Smokeless Tobacco Never Used   Tobacco Comment    quit       Social History     Substance and Sexual Activity   Alcohol Use Yes    Comment: rarely     ROS   I have reviewed the review of system questionnaire filled by the patient .   Patient was advised to contact PCP for non endocrine signs and symptoms       EXAM   Constitutional: no acute distress, well appearing, well nourished  Psychiatric: oriented to person, place and time, judgement, insight and normal, recent and remote memory and intact and mood, affect are normal  Skin: skin and subcutaneous tissue is normal without mass,   Head and Face: examination of head and face revealed no abnormalities  Eyes: no lid or conjunctival swelling, no erythema or discharge, pupils are normal,   Ears/Nose: external inspection of ears and nose revealed no abnormalities, hearing is grossly normal  Oropharynx/Mouth/Face: lips, tongue and gums are normal with no lesions, the voice quality was normal  Neck: neck is supple and symmetric, with midline trachea and no masses, thyroid is normal    Pulmonary: no increased work of breathing or signs of respiratory distress, lungs are clear to auscultation  Cardiovascular: normal heart rate and rhythm, normal S1 and S2,   Musculoskeletal: normal gait and station,   Neurological: normal coordination, normal general cortical function    Lab Results   Component Value Date    LABA1C 8.3 09/14/2021     Lab Results   Component Value Date    .5 09/14/2021       Assessment/Plan      --- Uncontrolled Type 2 DM     Jose Whitney is a 66 y.o. male has Type 2 DM with obesity and insulin resistance. Uncontrolled. Aic 7.8 >>8.3  Uing freestyle danyell, but does not scan has often previously had phobia of low sugars. -on  lantus 32 units QAM   -Use Humalog 7-8 units with meals + SSI but mostly takes 6 units with breakfast and does not take any insulin with lunch as he worries about hypoglycemia and takes it after he eats lunch. I tried to explain to patient that he needs to take meal boluses 10 minutes prior to eating  --advised to take meal boluses 10 min   He will take 2 extra units at 11 am to bring the spike down that happens around noon to one   -ContinueSynjardy to  mg daily. Advised to reduce carb intake. Health maintenance     Advised follow-up with the ophthalmologist once a year. last one was oct 2020    Last urine microalbumin/cr ratio high in march 2021 pt was told . On losartan elevated    Discussed foot care. Pt on ASA. Former smoker. 2. Hypertension. BP  at goal.   Check at home     3. Hyperlipidemia. On statins. Labs in 07/18--> 01/19---> 03/19--> 02/20      4. Melanoma S/p surgery. Diabetes Continuous Glucose Monitoring Report         Reason for Study:     - improve diabetic control without risk of hypoglycemia       Current Medication regimen:   lantus 32 units   6 units with breakfast        CGMS Report     CGMS data collection was performed on oct 4 , 2021   Patient provided information on his  diet, activities and insulin dosing  during this period.    Data was available for 14  days Sensor Data Report:   - 12 AM to 6 AM: Overnight blood glucose pattern shows stable glycemia   - 6   AM to 10 AM:  Post breakfast hyperglycemia  is noted  --10AM to 5 PM : no   hypoglycemia observed during this time.   - 5   PM to 8 PM: Post meal  is noted       Average reading  163 mg/dL     Standard Dev 24.6  mg/dL   % of time <70 mg/dL 0 %   % of time >180  mg/dL  34%   % of time within range  66  %  Number of hypoglycemia episodes noted: 0     Impression:   CGMS shows stable glycemia overnite      Recommendation:      Patient was advised to make the following changes in his diabetic regimen  Advised to start taking meal boluses 10 minutes before eating   --- lantus 32 units   He is having a spike at noon so he will take 2 additional units at 11 am as he he drinks his coffee at that time

## 2021-10-05 ENCOUNTER — CARE COORDINATION (OUTPATIENT)
Dept: OTHER | Facility: CLINIC | Age: 71
End: 2021-10-05

## 2021-10-05 NOTE — CARE COORDINATION
Ambulatory Care Coordination Note  10/5/2021  CM Risk Score: 3  Charlson 10 Year Mortality Risk Score: 79%     ACC: Mary Burgess LPN    Ambulatory Care Manager (ACM) contacted the patient by telephone to follow up on progress, discuss new issues or concerns, and reinforce/ provide patient education. Verified name and  with patient as identifiers. Patient states after talking with his endocrinologist yesterday, a plan was formulated with his insulin. He stated so far today, he did not take the extra units to avoid an afternoon spike, and so far today, he did not have a spike. ACM explained to patient to follow Dr. Salo Jung POC, to take the 2 extra units at 11 am and monitor his BG in the afternoon. If he ever drops too low, he will eat a CHO snack with 15g CHO or less. Explained that consistency will help prevent any hyper and hyoglycemic episodes. Patient denies further need for DM education and/or resources. He will out reach this ACM if he changes his mind. Care Coordination Interventions    Program Enrollment: Rising Risk  Referral from Primary Care Provider: No  Suggested Interventions and Community Resources  Diabetes Education: In Process (Comment: ACM DM education)  Disease Specific Clinic: Completed (Comment: Harrison Duke MD- ENDO)  Pharmacist: Completed (Comment: [facility])  Zone Management Tools: Completed (Comment: DM zone management emailed to patient)         Goals Addressed                 This Visit's Progress     COMPLETED: Self Monitoring   Improving     Self-Monitored Blood Glucose - I will notify my provider of any trends of increasing or decreasing blood sugars over a 1 month period. I will notify my provider if I have any blood sugar readings less than 70 more than 2 times a month.       Barriers: overwhelmed by complexity of regimen  Plan for overcoming my barriers: I will utilize the resources provided to me by my ACM and notify my PCP or specialists or any new or worsening changes. Confidence: 9/10  Anticipated Goal Completion Date: 6/22/21              Prior to Admission medications    Medication Sig Start Date End Date Taking? Authorizing Provider   Continuous Blood Gluc Sensor (FREESTYLE LA 14 DAY SENSOR) MISC USE ONE UNDER THE SKIN EVERY 14 DAYS 7/14/21   Glen Mehta MD   insulin lispro, 1 Unit Dial, (HUMALOG KWIKPEN) 100 UNIT/ML SOPN INJECT 10 UNITS UNDER THE SKIN THREE TIMES A DAY BEFORE MEALS 7/14/21   Glen Mehta MD   insulin glargine (LANTUS) 100 UNIT/ML injection vial INJECT 34 UNITS INTO THE SKIN DAILY. Patient taking differently: INJECT 32 UNITS INTO THE SKIN DAILY. 7/14/21   Glen Mehta MD   Insulin Pen Needle (TRUEPLUS PEN NEEDLES) 31G X 8 MM MISC USE TO INJECT INSULIN 4 TIMES DAILY. 7/14/21   Glen Mehta MD   Insulin Syringe-Needle U-100 (TRUEPLUS INSULIN SYRINGE) 31G X 5/16\" 1 ML MISC USE DAILY 7/14/21   Glen Mehta MD   losartan (COZAAR) 100 MG tablet TAKE 1 TABLET BY MOUTH ONE TIME A DAY 7/14/21   Glen Mehta MD   Empagliflozin-metFORMIN HCl ER (SYNJARDY XR)  MG TB24 TAKE 1 TABLET BY MOUTH ONE TIME A DAY 7/14/21   Glen Mehta MD   atorvastatin (LIPITOR) 20 MG tablet TAKE 1 TABLET BY MOUTH ONE TIME A DAY 7/14/21   Glen Mehta MD   amLODIPine (NORVASC) 5 MG tablet TAKE 1 TABLET BY MOUTH ONE TIME A DAY 7/14/21   Glen Mehta MD   omeprazole (PRILOSEC) 40 MG delayed release capsule TAKE 1 CAPSULE BY MOUTH DAILY 7/14/21   Glen Mehta MD   albuterol sulfate  (90 Base) MCG/ACT inhaler 2 puffs every 6 hours as needed for shortness of breath or wheezing 2/16/21   Davide Samson MD   Continuous Blood Gluc  (FREESTYLE LA 14 DAY READER) DORA 1 each by Does not apply route daily 1/30/19   Kinza De Leon MD   Cholecalciferol (VITAMIN D3) 1000 UNITS CAPS Take 1,000 each by mouth daily. Historical Provider, MD   aspirin EC 81 MG EC tablet Take 1 tablet by mouth daily.  4/6/12   Wilber Beckwith, DO       Future Appointments Date Time Provider Nikunj Casanova   1/31/2022  2:00 PM Pauly Vega MD [facility] [place]. Angelica Jones, [address] Coordinator  Associate Care Management  [address] [place] [address]  Phone: [phone]  [email or web address]

## 2021-10-18 ENCOUNTER — TELEPHONE (OUTPATIENT)
Dept: FAMILY MEDICINE CLINIC | Age: 71
End: 2021-10-18

## 2021-10-18 ENCOUNTER — TELEPHONE (OUTPATIENT)
Dept: ENDOCRINOLOGY | Age: 71
End: 2021-10-18

## 2021-10-18 NOTE — TELEPHONE ENCOUNTER
Has he been taking that extra 2 units at 10 AM for his coffee, if he has been then he can increase it to 3 units otherwise take 2 units around 10 AM+

## 2021-10-18 NOTE — TELEPHONE ENCOUNTER
Please ask them to go online and print and get us a copy of the be well form. Sorry for the inconvenience. It does not seem to be going automatically electronically this year.

## 2021-11-17 ENCOUNTER — OFFICE VISIT (OUTPATIENT)
Dept: FAMILY MEDICINE CLINIC | Age: 71
End: 2021-11-17
Payer: COMMERCIAL

## 2021-11-17 VITALS
BODY MASS INDEX: 34.16 KG/M2 | SYSTOLIC BLOOD PRESSURE: 148 MMHG | DIASTOLIC BLOOD PRESSURE: 98 MMHG | HEART RATE: 103 BPM | OXYGEN SATURATION: 97 % | HEIGHT: 65 IN | WEIGHT: 205 LBS

## 2021-11-17 DIAGNOSIS — F41.1 GAD (GENERALIZED ANXIETY DISORDER): Primary | ICD-10-CM

## 2021-11-17 PROCEDURE — 99215 OFFICE O/P EST HI 40 MIN: CPT | Performed by: FAMILY MEDICINE

## 2021-11-17 PROCEDURE — 3052F HG A1C>EQUAL 8.0%<EQUAL 9.0%: CPT | Performed by: FAMILY MEDICINE

## 2021-11-17 RX ORDER — HYDROXYZINE HYDROCHLORIDE 25 MG/1
25 TABLET, FILM COATED ORAL EVERY 8 HOURS PRN
Qty: 30 TABLET | Refills: 1 | Status: SHIPPED | OUTPATIENT
Start: 2021-11-17 | End: 2021-11-27

## 2021-11-17 RX ORDER — ESCITALOPRAM OXALATE 10 MG/1
10 TABLET ORAL DAILY
Qty: 30 TABLET | Refills: 3 | Status: SHIPPED | OUTPATIENT
Start: 2021-11-17 | End: 2021-11-30

## 2021-11-17 NOTE — PROGRESS NOTES
Ana Spencer is a 66 y.o. male. HPI:  Has been taking lantus 32 units qam. Around 1pm notes his glucose dropping quickly after eating. Taking humalog with each meal. Does not take it before hand, usually takes it after eating. Notices his glucose spiking then plummeting. Has continuous monitor, checking his sugar probably 20-30 times per day per patient. Is very worried when high and when drops suddenly. Has called EMS twice for it dropping suddenly although his glucose was in the 140s when they came. His anxiety about his diabetes is affecting his life. Has trouble leaving hte house, constantly worried about his glucose. Causing trouble with his wife. Feels she doesn't want to hear it anymore. Feels he has no one to talk to and is letting her down. Feels he has let everyone down his whole life. Has not slept a few nights this week, was just awake watching his glucose all night. ROS:  Gen:  Denies fever, chills, headaches. HEENT:  Denies cold symptoms, sore throat. CV:  Denies chest pain or tightness, palpitations. Pulm:  Denies shortness of breath, cough. Abd:  Denies abdominal pain, change in bowel habits. I have reviewed the patient's medical/surgical/family/social in detail and updated the computerized patient record as appropriate. Current Outpatient Medications   Medication Sig Dispense Refill    Continuous Blood Gluc Sensor (FREESTYLE LA 14 DAY SENSOR) MISC USE ONE UNDER THE SKIN EVERY 14 DAYS 6 each 2    insulin lispro, 1 Unit Dial, (HUMALOG KWIKPEN) 100 UNIT/ML SOPN INJECT 10 UNITS UNDER THE SKIN THREE TIMES A DAY BEFORE MEALS 10 pen 1    insulin glargine (LANTUS) 100 UNIT/ML injection vial INJECT 34 UNITS INTO THE SKIN DAILY. (Patient taking differently: INJECT 32 UNITS INTO THE SKIN DAILY. ) 4 vial 1    Insulin Pen Needle (TRUEPLUS PEN NEEDLES) 31G X 8 MM MISC USE TO INJECT INSULIN 4 TIMES DAILY.  400 each 1    Insulin Syringe-Needle U-100 (TRUEPLUS INSULIN SYRINGE) 31G X 5/16\" 1 ML MISC USE DAILY 100 each 3    losartan (COZAAR) 100 MG tablet TAKE 1 TABLET BY MOUTH ONE TIME A DAY 90 tablet 1    Empagliflozin-metFORMIN HCl ER (SYNJARDY XR)  MG TB24 TAKE 1 TABLET BY MOUTH ONE TIME A DAY 90 tablet 1    atorvastatin (LIPITOR) 20 MG tablet TAKE 1 TABLET BY MOUTH ONE TIME A DAY 90 tablet 1    amLODIPine (NORVASC) 5 MG tablet TAKE 1 TABLET BY MOUTH ONE TIME A DAY 90 tablet 1    omeprazole (PRILOSEC) 40 MG delayed release capsule TAKE 1 CAPSULE BY MOUTH DAILY 90 capsule 1    albuterol sulfate  (90 Base) MCG/ACT inhaler 2 puffs every 6 hours as needed for shortness of breath or wheezing 1 Inhaler 3    Continuous Blood Gluc  (SlidebeanE 14 DAY READER) DORA 1 each by Does not apply route daily 1 Device 0    Cholecalciferol (VITAMIN D3) 1000 UNITS CAPS Take 1,000 each by mouth daily.  aspirin EC 81 MG EC tablet Take 1 tablet by mouth daily. 30 tablet 11     No current facility-administered medications for this visit.        Past Medical History:   Diagnosis Date    Asthma     childhood     CAD (coronary artery disease)     Coronary artery calcification seen on CAT scan     GERD (gastroesophageal reflux disease)     Hyperlipidemia     Hypertension     Melanoma in situ ([address]     Melanoma in situ of skin of buttock ([address] 11/2016    RT abdomen    Moderate single current episode of major depressive disorder ([address] 7/6/2017    Obesity     Osteoarthritis     Proteinuria     Salcedo Hunt syndrome 1999    Rosacea     Stricture, esophagus     Type II or unspecified type diabetes mellitus without mention of complication, not stated as uncontrolled     Vitamin D deficiency      Past Surgical History:   Procedure Laterality Date    CATARACT REMOVAL Right 10/2020    CHOLECYSTECTOMY  2000    COLONOSCOPY  2002    TOE AMPUTATION Left 1980    lawnmower accident- 2 toes    UPPER GASTROINTESTINAL ENDOSCOPY  2002    VASECTOMY       Family History   Problem Relation Age of Onset    Cancer Father         kidney    Diabetes Father     Kidney Disease Father     Diabetes Sister     Cancer Paternal Grandfather         melanoma     Social History     Socioeconomic History    Marital status:      Spouse name: Not on file    Number of children: Not on file    Years of education: Not on file    Highest education level: Not on file   Occupational History    Occupation: retired    Tobacco Use    Smoking status: Former Smoker     Packs/day: 2.00     Years: 15.00     Pack years: 30.00     Types: Cigarettes     Quit date:      Years since quittin.8    Smokeless tobacco: Never Used    Tobacco comment: quit    Vaping Use    Vaping Use: Never used   Substance and Sexual Activity    Alcohol use: Yes     Comment: rarely    Drug use: No    Sexual activity: Yes     Partners: Female   Other Topics Concern    Not on file   Social History Narrative    Using [employer] and test to ensure he is accurate for his dosing      Social Determinants of Health     Financial Resource Strain: Low Risk     Difficulty of Paying Living Expenses: Not hard at all   Food Insecurity: No Food Insecurity    Worried About [address] in the Last Year: Never true    [address] in the Last Year: Never true   Transportation Needs:     Lack of Transportation (Medical): Not on file    Lack of Transportation (Non-Medical):  Not on file   Physical Activity:     Days of Exercise per Week: Not on file    Minutes of Exercise per Session: Not on file   Stress:     Feeling of Stress : Not on file   Social Connections:     Frequency of Communication with Friends and Family: Not on file    Frequency of Social Gatherings with Friends and Family: Not on file    Attends Yazidism Services: Not on file    Active Member of Clubs or Organizations: Not on file    Attends Club or Organization Meetings: Not on file    Marital Status: Not on file Intimate Partner Violence:     Fear of Current or Ex-Partner: Not on file    Emotionally Abused: Not on file    Physically Abused: Not on file    Sexually Abused: Not on file   Housing Stability:     Unable to Pay for Housing in the Last Year: Not on file    Number of Alejandro in the Last Year: Not on file    Unstable Housing in the Last Year: Not on file         OBJECTIVE:  BP (!) 148/98 (Site: Right Upper Arm, Position: Sitting, Cuff Size: Medium Adult)   Pulse 103   Ht 5' 5\" (1.651 m)   Wt 205 lb (93 kg)   SpO2 97%   BMI 34.11 kg/m²   GEN:  WDWN, NAD  HEENT:  NCAT, PERRL, EOMI. PSYCH: labile affect. Impaired judgement and insight. Tearful/angry at times  NEURO: A&O x 3    ASSESSMENT/PLAN:  1. FLAVIA (generalized anxiety disorder)  Long discussion about anxiety treatment. Recommend daily ssri. Patient was counseled on starting on antidepressant and that it takes 4-6 weeks to take full effect. Counseled on possible side effects including worsening suicidal ideation, nausea, fatigue and sexual side effects. Patient to call our office if not tolerating the medication. Otherwise in 1 month(s)  Will also start hydroxyzine PRN  Set up with [employer] [facility] as well  - escitalopram (LEXAPRO) 10 MG tablet; Take 1 tablet by mouth daily  Dispense: 30 tablet; Refill: 3  - hydrOXYzine (ATARAX) 25 MG tablet; Take 1 tablet by mouth every 8 hours as needed for Anxiety  Dispense: 30 tablet; Refill: 1    2. Uncontrolled type 2 diabetes mellitus with complication, with long-term current use of insulin ([address]  Long discussion about fear of hypoglycemia although has not occurred in 2+ years on his monitoring  Discussed timing of his insulin doses at length and why he should take humalog 10 min prior to meals as well as meal choices  Follow up w mahsa as scheduled as well    I spent a total of 41 minutes on the day of the visit.

## 2021-11-18 ENCOUNTER — CARE COORDINATION (OUTPATIENT)
Dept: OTHER | Facility: CLINIC | Age: 71
End: 2021-11-18

## 2021-11-18 NOTE — CARE COORDINATION
mouth daily 11/17/21   Saumya Ugarte MD   hydrOXYzine (ATARAX) 25 MG tablet Take 1 tablet by mouth every 8 hours as needed for Anxiety 11/17/21 11/27/21  Saumya Ugarte MD   Continuous Blood Gluc Sensor (FREESTYLE LA 14 DAY SENSOR) MISC USE ONE UNDER THE SKIN EVERY 14 DAYS 7/14/21   Glen Mehta MD   insulin lispro, 1 Unit Dial, (HUMALOG KWIKPEN) 100 UNIT/ML SOPN INJECT 10 UNITS UNDER THE SKIN THREE TIMES A DAY BEFORE MEALS 7/14/21   Glen Mehta MD   insulin glargine (LANTUS) 100 UNIT/ML injection vial INJECT 34 UNITS INTO THE SKIN DAILY. Patient taking differently: INJECT 32 UNITS INTO THE SKIN DAILY. 7/14/21   Glen Mehta MD   Insulin Pen Needle (TRUEPLUS PEN NEEDLES) 31G X 8 MM MISC USE TO INJECT INSULIN 4 TIMES DAILY. 7/14/21   Glen Mehta MD   Insulin Syringe-Needle U-100 (TRUEPLUS INSULIN SYRINGE) 31G X 5/16\" 1 ML MISC USE DAILY 7/14/21   Glen Mehta MD   losartan (COZAAR) 100 MG tablet TAKE 1 TABLET BY MOUTH ONE TIME A DAY 7/14/21   Glen Mehta MD   Empagliflozin-metFORMIN HCl ER (SYNJARDY XR)  MG TB24 TAKE 1 TABLET BY MOUTH ONE TIME A DAY 7/14/21   Glen Mehta MD   atorvastatin (LIPITOR) 20 MG tablet TAKE 1 TABLET BY MOUTH ONE TIME A DAY 7/14/21   Glen Mehta MD   amLODIPine (NORVASC) 5 MG tablet TAKE 1 TABLET BY MOUTH ONE TIME A DAY 7/14/21   Glen Mehta MD   omeprazole (PRILOSEC) 40 MG delayed release capsule TAKE 1 CAPSULE BY MOUTH DAILY 7/14/21   Glen Mehta MD   albuterol sulfate  (90 Base) MCG/ACT inhaler 2 puffs every 6 hours as needed for shortness of breath or wheezing 2/16/21   Davide Samson MD   Continuous Blood Gluc  (FREESTYLE LA 14 DAY READER) DORA 1 each by Does not apply route daily 1/30/19   Kinza De Leon MD   Cholecalciferol (VITAMIN D3) 1000 UNITS CAPS Take 1,000 each by mouth daily. Historical Provider, MD   aspirin EC 81 MG EC tablet Take 1 tablet by mouth daily.  4/6/12   Wilber Beckwith, DO       Future Appointments   Date Time Provider Nikunj Casanova   11/18/2021  3:00 PM [facility]   12/15/2021  4:00 PM Jamal Escobedo MD [facility] [place] [facility]   1/31/2022  2:00 PM Samira Dominguez MD [facility]         Darci Hemphill. José Luis Ruiz, [address] Coordinator  Associate Care Management  [address] [place] [address]  Phone: [phone]  [email or web address]

## 2021-11-23 ENCOUNTER — CARE COORDINATION (OUTPATIENT)
Dept: OTHER | Facility: CLINIC | Age: 71
End: 2021-11-23

## 2021-11-26 ENCOUNTER — CARE COORDINATION (OUTPATIENT)
Dept: OTHER | Facility: CLINIC | Age: 71
End: 2021-11-26

## 2021-11-26 NOTE — CARE COORDINATION
Ambulatory Care Coordination Note  11/26/2021  CM Risk Score: 3  Charlson 10 Year Mortality Risk Score: 79%     ACC: Sundar Eli LPN    Spoke to patient. He states he has been taking the Lexapro daily and the Hydroxyzine usually in the afternoons when his anxiety spikes worrying about his blood sugars. Patient states he does feel like the Hydroxyzine is working, advised him to give the Lexapro a few more weeks as this usually takes 4-6 weeks to begin its affect. Patient had a scheduled appointment with a counselor through his PCP office but cancelled it, as he feels like \"I don't want to have to worry about a counselor, I can't get these blood sugars out of my head. \"     Patient checks his sugars on his CGM throughout the day and night. Recently he has not been sleeping well d/t the anxiety of checking his BG.     ACM discussed how following up with a counselor is vital to his mental health right now. Encouraged him to reschedule this appointment and try to focus on activities around the house to stay busy. , like starting a new project of some sort. Patient has been dosing his SA 10 minutes prior to meals. Patient continuing to feel the need to express his concerns to Dr. Nik Cordero- he made an appointment for next week. He was unsure how to send her his numbers, so he plans to bring in his CGM and discuss his medications and BG. Care Coordination Interventions    Program Enrollment: Rising Risk  Referral from Primary Care Provider: No  Suggested Interventions and Community Resources         Goals Addressed                 This Visit's Progress    [address]   On track     I will work towards the following [facility] goals: I will continue to follow up with my psychologist /counselor and/or psychiatrist. and I will take my medications daily as prescribed.     Barriers: lack of support and lack of education  Plan for overcoming my barriers: I will continue to follow my counselor, Wilder Yarbrough, and be 100% compliant with my Lexapro and take my Hydroxyzine as needed. I will let my PCP, ACM, or my counselor know if I have any concerns. Confidence: 8/10  Anticipated Goal Completion Date: 12/18/21            Prior to Admission medications    Medication Sig Start Date End Date Taking? Authorizing Provider   escitalopram (LEXAPRO) 10 MG tablet Take 1 tablet by mouth daily 11/17/21  Yes Lesleigh Mohs, MD   hydrOXYzine (ATARAX) 25 MG tablet Take 1 tablet by mouth every 8 hours as needed for Anxiety 11/17/21 11/27/21 Yes Lesleigh Mohs, MD   Continuous Blood Gluc Sensor (FREESTYLE LA 14 DAY SENSOR) MISC USE ONE UNDER THE SKIN EVERY 14 DAYS 7/14/21  Yes Cristina Garcia MD   insulin lispro, 1 Unit Dial, (HUMALOG KWIKPEN) 100 UNIT/ML SOPN INJECT 10 UNITS UNDER THE SKIN THREE TIMES A DAY BEFORE MEALS 7/14/21  Yes Cristina Garcia MD   insulin glargine (LANTUS) 100 UNIT/ML injection vial INJECT 34 UNITS INTO THE SKIN DAILY. Patient taking differently: INJECT 32 UNITS INTO THE SKIN DAILY. 7/14/21  Yes Cristina Garcia MD   Insulin Pen Needle (TRUEPLUS PEN NEEDLES) 31G X 8 MM MISC USE TO INJECT INSULIN 4 TIMES DAILY.  7/14/21  Yes Cristina Garcia MD   Insulin Syringe-Needle U-100 (TRUEPLUS INSULIN SYRINGE) 31G X 5/16\" 1 ML MISC USE DAILY 7/14/21  Yes Cristina Garcia MD   losartan (COZAAR) 100 MG tablet TAKE 1 TABLET BY MOUTH ONE TIME A DAY 7/14/21  Yes Cristina Garcia MD   Empagliflozin-metFORMIN HCl ER (SYNJARDY XR)  MG TB24 TAKE 1 TABLET BY MOUTH ONE TIME A DAY 7/14/21  Yes Cristina Garcia MD   atorvastatin (LIPITOR) 20 MG tablet TAKE 1 TABLET BY MOUTH ONE TIME A DAY 7/14/21  Yes Cristina Garcia MD   amLODIPine (NORVASC) 5 MG tablet TAKE 1 TABLET BY MOUTH ONE TIME A DAY 7/14/21  Yes Cristina Garcia MD   omeprazole (PRILOSEC) 40 MG delayed release capsule TAKE 1 CAPSULE BY MOUTH DAILY 7/14/21  Yes Cristina Garcia MD   albuterol sulfate  (90 Base) MCG/ACT inhaler 2 puffs every 6 hours as needed for shortness of breath or wheezing 2/16/21  Yes Darryl Brown MD   Continuous Blood Gluc  (FREESTYLE LA 14 DAY READER) DORA 1 each by Does not apply route daily 1/30/19  Yes Maria R Costello MD   Cholecalciferol (VITAMIN D3) 1000 UNITS CAPS Take 1,000 each by mouth daily. Yes Historical Provider, MD   aspirin EC 81 MG EC tablet Take 1 tablet by mouth daily. 4/6/12  Yes Alejandro Bah, DO       Future Appointments   Date Time Provider Nikunj Casanova   12/15/2021  4:00 PM Kwasi Aparicio MD [facility] [place] [facility]   1/31/2022  2:00 PM Arti Hudson MD [facility]       Giuliana Gomez. Yeimi Palmer, [address] Coordinator  Associate Care Management  [address] [place] [address] Street  Phone: [phone]  [email or web address]

## 2021-11-30 ENCOUNTER — OFFICE VISIT (OUTPATIENT)
Dept: ENDOCRINOLOGY | Age: 71
End: 2021-11-30
Payer: COMMERCIAL

## 2021-11-30 VITALS
SYSTOLIC BLOOD PRESSURE: 144 MMHG | HEART RATE: 91 BPM | RESPIRATION RATE: 16 BRPM | BODY MASS INDEX: 33.82 KG/M2 | HEIGHT: 65 IN | DIASTOLIC BLOOD PRESSURE: 74 MMHG | WEIGHT: 203 LBS

## 2021-11-30 DIAGNOSIS — E66.01 MORBIDLY OBESE (HCC): ICD-10-CM

## 2021-11-30 DIAGNOSIS — E78.2 MIXED HYPERLIPIDEMIA: ICD-10-CM

## 2021-11-30 PROCEDURE — 95251 CONT GLUC MNTR ANALYSIS I&R: CPT | Performed by: INTERNAL MEDICINE

## 2021-11-30 PROCEDURE — 3052F HG A1C>EQUAL 8.0%<EQUAL 9.0%: CPT | Performed by: INTERNAL MEDICINE

## 2021-11-30 PROCEDURE — 99215 OFFICE O/P EST HI 40 MIN: CPT | Performed by: INTERNAL MEDICINE

## 2021-11-30 NOTE — PROGRESS NOTES
Enoch Gleason is a 69 y.o. male who is here for a follow-up for management of uncontrolled DM. Patient has a PMH of Type 2 DM, hypertension, hyperlipidemia , melanoma ( managed by Dr. Mario Urbina), obesity. Eye surgery ( 10/.20 )     Diagnosed with Diabetes Mellitus type 2 since the age of 38 yrs,  Course has been variable . Microvascular complications: No known retinopathy (Last eye exam: 4/18), No nephropathy . No Peripheral neuropathy  No Macrovascular complications. Had a normal CTA coronary arteries in 11/2013. Home regimen:  Lantus 34 units in A.M   Humalog 6-7 units with meals. Synjardy  mg daily. Previous meds : Unable to tolerate victoza. Trulicity ( nausea)Xigduo 5-500 mg daily. Using freestyle danyell  Hypoglycemia : No significant episodes recently. Has Hypoglycemia awareness. Diet:. Eats breakfast at noon, lunch at 5 p.m and dinner at 9 P.M  Had Nutrition education:  No planned exercise. Patient also has hyperlipidemia and is on Lipitor 20 mg daily. Tolerating without side effects    He carries the diagnosis of  HTN for many yrs  Was on Exforge 5-160 mg daily. Now taking losartan 100 mg daily, amlodipine 5 mg daily. Tolerating without side effects    INTERIM  Patient requested sooner follow-up visit as he is extremely frustrated when his glucose values. Patient has severe phobia of hypoglycemia which is hindering his care of diabetes. Patient understands that he is currently not having any hypoglycemia. Today I discussed target glucose values with him in detail and also discussed hypoglycemia management as well as definition of hypoglycemia. He fully understands it and realizes that he ends up correcting glucose way above 150 just to avoid hypoglycemia. On review of his continuous glucose data there was 0 documented hypoglycemia most glucose values were above 150 ranging up to 350.   Patient was started on Lexapro by his primary care physician for his possible anxiety disorder he has taken it for 2 weeks and notes significant night sweats and wants to discontinue it. He was advised to consult with his primary care physician. No Known Allergies  Outpatient Medications Marked as Taking for the 11/30/21 encounter (Office Visit) with Irwin Andersen MD   Medication Sig Dispense Refill    Continuous Blood Gluc Sensor (FREESTYLE LA 14 DAY SENSOR) MISC USE ONE UNDER THE SKIN EVERY 14 DAYS 6 each 2    insulin lispro, 1 Unit Dial, (HUMALOG KWIKPEN) 100 UNIT/ML SOPN INJECT 10 UNITS UNDER THE SKIN THREE TIMES A DAY BEFORE MEALS 10 pen 1    insulin glargine (LANTUS) 100 UNIT/ML injection vial INJECT 34 UNITS INTO THE SKIN DAILY. (Patient taking differently: INJECT 32 UNITS INTO THE SKIN DAILY. ) 4 vial 1    Insulin Pen Needle (TRUEPLUS PEN NEEDLES) 31G X 8 MM MISC USE TO INJECT INSULIN 4 TIMES DAILY. 400 each 1    Insulin Syringe-Needle U-100 (TRUEPLUS INSULIN SYRINGE) 31G X 5/16\" 1 ML MISC USE DAILY 100 each 3    losartan (COZAAR) 100 MG tablet TAKE 1 TABLET BY MOUTH ONE TIME A DAY 90 tablet 1    Empagliflozin-metFORMIN HCl ER (SYNJARDY XR)  MG TB24 TAKE 1 TABLET BY MOUTH ONE TIME A DAY 90 tablet 1    atorvastatin (LIPITOR) 20 MG tablet TAKE 1 TABLET BY MOUTH ONE TIME A DAY 90 tablet 1    amLODIPine (NORVASC) 5 MG tablet TAKE 1 TABLET BY MOUTH ONE TIME A DAY 90 tablet 1    omeprazole (PRILOSEC) 40 MG delayed release capsule TAKE 1 CAPSULE BY MOUTH DAILY 90 capsule 1    albuterol sulfate  (90 Base) MCG/ACT inhaler 2 puffs every 6 hours as needed for shortness of breath or wheezing 1 Inhaler 3    Continuous Blood Gluc  (FREESTYLE LA 14 DAY READER) DORA 1 each by Does not apply route daily 1 Device 0    Cholecalciferol (VITAMIN D3) 1000 UNITS CAPS Take 1,000 each by mouth daily.  aspirin EC 81 MG EC tablet Take 1 tablet by mouth daily. 30 tablet 11     Body mass index is 33.78 kg/m².      Wt Readings from Last 3 Encounters:   11/30/21 203 lb (92.1 kg)   11/17/21 205 lb (93 kg)   10/04/21 209 lb (94.8 kg)     BP Readings from Last 3 Encounters:   21 (!) 144/74   21 (!) 148/98   10/04/21 132/72        Past Medical History:   Diagnosis Date    Asthma     childhood     CAD (coronary artery disease)     Coronary artery calcification seen on CAT scan     GERD (gastroesophageal reflux disease)     Hyperlipidemia     Hypertension     [facility]     Melanoma in situ of skin of buttock ([facility] [facility] 2016    RT abdomen    Moderate single current episode of major depressive disorder ([facility] 2017    Obesity     Osteoarthritis     Proteinuria     Salcedo Hunt syndrome     Rosacea     Stricture, esophagus     Type II or unspecified type diabetes mellitus without mention of complication, not stated as uncontrolled     Vitamin D deficiency      Past Surgical History:   Procedure Laterality Date    CATARACT REMOVAL Right 10/2020    CHOLECYSTECTOMY      COLONOSCOPY      TOE AMPUTATION Left     lawnmower accident- 2 toes    UPPER GASTROINTESTINAL ENDOSCOPY      VASECTOMY       Family History   Problem Relation Age of Onset    Cancer Father         kidney    Diabetes Father     Kidney Disease Father     Diabetes Sister     Cancer Paternal Grandfather         melanoma     Social History     Tobacco Use   Smoking Status Former Smoker    Packs/day: 2.00    Years: 15.00    Pack years: 30.00    Types: Cigarettes    Quit date:     Years since quittin.9   Smokeless Tobacco Never Used   Tobacco Comment    quit       Social History     Substance and Sexual Activity   Alcohol Use Yes    Comment: rarely     ROS   I have reviewed the review of system questionnaire filled by the patient .   Patient was advised to contact PCP for non endocrine signs and symptoms       EXAM   Constitutional: no acute distress, well appearing, well nourished  Psychiatric: oriented to person, place and time, judgement, insight and normal, recent and remote memory and intact and mood, affect are normal  Skin: skin and subcutaneous tissue is normal without mass,   Head and Face: examination of head and face revealed no abnormalities  Eyes: no lid or conjunctival swelling, no erythema or discharge, pupils are normal,   Ears/Nose: external inspection of ears and nose revealed no abnormalities, hearing is grossly normal  Oropharynx/Mouth/Face: lips, tongue and gums are normal with no lesions, the voice quality was normal  Neck: neck is supple and symmetric, with midline trachea and no masses, thyroid is normal    Pulmonary: no increased work of breathing or signs of respiratory distress, lungs are clear to auscultation  Cardiovascular: normal heart rate and rhythm, normal S1 and S2,   Musculoskeletal: normal gait and station,   Neurological: normal coordination, normal general cortical function    Lab Results   Component Value Date    LABA1C 8.3 09/14/2021     Lab Results   Component Value Date    .5 09/14/2021       Assessment/Plan      --- Uncontrolled Type 2 DM     Yg Ball is a 66 y.o. male has Type 2 DM with obesity and insulin resistance. Severe Anxiety which is hindering diabetes care. As worried about glucose getting low which is not the case in the l;ast few months   He panicks at glucose of 150 and start consuming quick acting carbs. Uncontrolled. Aic 7.8 >>8.3  He tends to miss doses of insulin specifically lunchtime doses to avoid low glucose. I had a lengthy discussion with the patient regarding glucose targets and advised him not to correct a glucose value above 90. Client also advised him to take his meal boluses 10 minutes prior to eating even when glucose values are between 100-1 30.    -on  lantus 32 units QAM   -Use Humalog 7-8 units with meals + SSI but mostly takes 6 units with breakfast and does not take any insulin with lunch as he worries about hypoglycemia and takes it after he eats lunch.   He was advised to increase hs meal bolus by 1 unit and slowly uptitrate as it  He has fear of hypoglycemia ,PCP started on esctilopram but he is having sweating and doesn't want to continue it      -Continue Synjardy to  mg daily. Advised to reduce carb intake. Health maintenance     Advised follow-up with the ophthalmologist once a year. last one was oct 2020    Last urine microalbumin/cr ratio high in march 2021 pt was told . On losartan elevated    Discussed foot care. Pt on ASA. Former smoker. 2. Hypertension. BP  at goal at home slightly elevated today  Advised to check at home     3. Hyperlipidemia. On statins. Labs in 07/18--> 01/19---> 03/19--> 02/20      4. Melanoma S/p surgery. Diabetes Continuous Glucose Monitoring Report         Reason for Study:     - improve diabetic control without risk of hypoglycemia       Current Medication regimen:   lantus 32 units   6 units with breakfast        CGMS Report     CGMS data collection was performed on November 30, 2021   Patient provided information on his  diet, activities and insulin dosing  during this period. Data was available for 14  days     Sensor Data Report:   - 12 AM to 6 AM: Overnight blood glucose pattern shows stable glycemia   - 6   AM to 10 AM:  Post breakfast hyperglycemia  is noted  --10AM to 5 PM : no   hypoglycemia observed during this time. - 5   PM to 8 PM: Post meal  is noted       Average reading 225mg/dL     Standard Dev 25.2  mg/dL   % of time <70 mg/dL 0 %   % of time >180  mg/dL 70 %   % of time within range  30 %  Number of hypoglycemia episodes noted: 0     Impression:   CGMS shows stable glycemia overnite      Recommendation:      Patient was advised to make the following changes in his diabetic regimen  Advised to start taking meal boluses 10 minutes before eating   --- lantus 32 units   Advised to slowly uptitrate meal boluses and should continue taking meal boluses even with glucose values between 100-1 50.   He will send continuous glucose sensor data to me in 2 to 3 weeks. I spent  40 + minutes which includes reviewing patient chart , interpreting previous lab results  , discussing and providing counseling and coordinating care of patient's multiple health issues with  the patient.

## 2021-12-01 ENCOUNTER — CARE COORDINATION (OUTPATIENT)
Dept: OTHER | Facility: CLINIC | Age: 71
End: 2021-12-01

## 2021-12-01 NOTE — CARE COORDINATION
Ambulatory Care Coordination Note  12/1/2021  CM Risk Score: 3  Charlson 10 Year Mortality Risk Score: 79%     ACC: Leander Faulkner LPN    Spoke to patient. He is overall doing much better. He states he has a plan, had a great discussion with endo yesterday. He states he feels more \"secure\" with taking his correct dose of SA insulin. He is now taking 8 units before breakfast, and 7 units before lunch and dinner. Continuing his LA HS. Patient stated he d/c the Lexapro and Hydroxyzine as he feels less anxious and was having significant sweating with the Lexapro. He states his clothes were soaked with sweat during the night. Patient states he may cancel his f/u with PCP he has on 12/15. ACM recommended he keep this appt to discuss his anxiety level r/t his BG numbers. ACM discussed the following RED FLAGS and encouraged patient to contact 911 for life threatening emergencies and PCP office 24/7 for non life-threatening symptoms. ACM also encouraged outreach to Nurse Access Line and/or ACM for assessment and intervention guidance as needed. Reminded patient of alternatives to ED such as urgent care, walk in clinics and e-visits as available. Care Coordination Interventions    Program Enrollment: Rising Risk  Referral from Primary Care Provider: No  Suggested Interventions and Community Resources         Goals Addressed                 This Visit's Progress     Behavioral Health   No change     I will work towards the following Behavioral Health goals: I will continue to follow up with my psychologist /counselor and/or psychiatrist. and I will take my medications daily as prescribed. Barriers: lack of support and lack of education  Plan for overcoming my barriers: I will continue to follow my counselor, Paul Stewart, and be 100% compliant with my Lexapro and take my Hydroxyzine as needed. I will let my PCP, ACM, or my counselor know if I have any concerns.   Confidence: 8/10  Anticipated Goal Completion Date: 12/18/21            Prior to Admission medications    Medication Sig Start Date End Date Taking? Authorizing Provider   Continuous Blood Gluc Sensor (FREESTYLE LA 14 DAY SENSOR) MISC USE ONE UNDER THE SKIN EVERY 14 DAYS 7/14/21   Jennifer Joseph MD   insulin lispro, 1 Unit Dial, (HUMALOG KWIKPEN) 100 UNIT/ML SOPN INJECT 10 UNITS UNDER THE SKIN THREE TIMES A DAY BEFORE MEALS 7/14/21   Jennifer Joseph MD   insulin glargine (LANTUS) 100 UNIT/ML injection vial INJECT 34 UNITS INTO THE SKIN DAILY. Patient taking differently: INJECT 32 UNITS INTO THE SKIN DAILY. 7/14/21   Jennifer Joseph MD   Insulin Pen Needle (TRUEPLUS PEN NEEDLES) 31G X 8 MM MISC USE TO INJECT INSULIN 4 TIMES DAILY. 7/14/21   Jennifer Joseph MD   Insulin Syringe-Needle U-100 (TRUEPLUS INSULIN SYRINGE) 31G X 5/16\" 1 ML MISC USE DAILY 7/14/21   Jennifer Joseph MD   losartan (COZAAR) 100 MG tablet TAKE 1 TABLET BY MOUTH ONE TIME A DAY 7/14/21   Jennifer Joseph MD   Empagliflozin-metFORMIN HCl ER (SYNJARDY XR)  MG TB24 TAKE 1 TABLET BY MOUTH ONE TIME A DAY 7/14/21   Jennifer Joseph MD   atorvastatin (LIPITOR) 20 MG tablet TAKE 1 TABLET BY MOUTH ONE TIME A DAY 7/14/21   eJnnifer Joseph MD   amLODIPine (NORVASC) 5 MG tablet TAKE 1 TABLET BY MOUTH ONE TIME A DAY 7/14/21   Jennifer Joseph MD   omeprazole (PRILOSEC) 40 MG delayed release capsule TAKE 1 CAPSULE BY MOUTH DAILY 7/14/21   Jennifer Joseph MD   albuterol sulfate  (90 Base) MCG/ACT inhaler 2 puffs every 6 hours as needed for shortness of breath or wheezing 2/16/21   Minerva Kahn MD   Continuous Blood Gluc  (FREESTYLE LA 14 DAY READER) DORA 1 each by Does not apply route daily 1/30/19   Jamin Powell MD   Cholecalciferol (VITAMIN D3) 1000 UNITS CAPS Take 1,000 each by mouth daily. Historical Provider, MD   aspirin EC 81 MG EC tablet Take 1 tablet by mouth daily.  4/6/12   Kimi Violette, DO       Future Appointments   Date Time Provider Nkiunj Casanova   12/15/2021  4:00 PM Joe Hollis Deepak Sherman MD [facility]   1/31/2022  2:00 PM Rigoberto Aviles MD [facility]

## 2021-12-07 ENCOUNTER — TELEPHONE (OUTPATIENT)
Dept: PHARMACY | Facility: CLINIC | Age: 71
End: 2021-12-07

## 2021-12-07 NOTE — TELEPHONE ENCOUNTER
As of 12/3/21 patient has used $710 of the maximum of $600 a year in waived co pays for specific medications and pharmacy-related supplies through the [address] [place] for the DM Program.    Patient currently enrolled in the DM Program and has used all of the maximum of $600 a year in waived co pays for specific medications and pharmacy-related supplies through the [address] [place]. Courtesy call placed to patient to advise them of the above information. Patient unavailable at the time of call. Message left on TAD: Maximum waived co pays for the DM Program has been met and they will begin to be charged their co-payments once again. I left our number: [phone], option #3 if patient has any questions/concerns.       Forrest Matos, [address]   Phone: [phone], option #3

## 2021-12-14 ENCOUNTER — CARE COORDINATION (OUTPATIENT)
Dept: OTHER | Facility: CLINIC | Age: 71
End: 2021-12-14

## 2021-12-14 NOTE — CARE COORDINATION
Ambulatory Care Coordination Note  12/14/2021  CM Risk Score: 3  Charlson 10 Year Mortality Risk Score: 79%     ACC: Rhiannon Ny LPN    Spoke to patient. States he has \"good days and bad days. \" Some days, his BG remains more stable, others not so much. AC advised him to start a food journal so he can get a better understanding of his food choices and his BG. He states last HS, his sugar preprandial was 270, took 6 units, he fears taking more units. He states his sugar dropped to 200. ACM discussed taking the recommended 7-8 units especially if his sugar is 270. Reminded him that none of his numbers are low. ACM did congratulate patient for not having any readings above 300. Highest has been 270. ACM encouraged patient to record as many BG as he can using his CGM sam on his phone- that way the log will be sent to the endo office. ACM stated if he can remember to do this, Dr. Anand Aguayo can review his numbers and make adjustments as needed. This will end up decreasing his anxiety. He voiced understanding. Care Coordination Interventions    Program Enrollment: Rising Risk  Referral from Primary Care Provider: No  Suggested Interventions and Community Resources         Goals Addressed                 This Visit's Progress     Behavioral Health   No change     I will work towards the following Behavioral Health goals: I will continue to follow up with my psychologist /counselor and/or psychiatrist. and I will take my medications daily as prescribed. Barriers: lack of support and lack of education  Plan for overcoming my barriers: I will continue to follow my counselor, Dollie Gaucher, and be 100% compliant with my Lexapro and take my Hydroxyzine as needed. I will let my PCP, ACM, or my counselor know if I have any concerns. Confidence: 8/10  Anticipated Goal Completion Date: 12/18/21            Prior to Admission medications    Medication Sig Start Date End Date Taking?  Authorizing Provider   Continuous Blood Gluc [address]  Phone: [phone]  [email or web address]

## 2022-01-05 ENCOUNTER — TELEPHONE (OUTPATIENT)
Dept: PHARMACY | Facility: CLINIC | Age: 72
End: 2022-01-05

## 2022-01-05 NOTE — TELEPHONE ENCOUNTER
2022 Annual Pharmacist Visit    Called patient to schedule 2022 yearly pharmacist appointment to discuss medications for Diabetes Management Program.    No answer. Left VM on mobile TAD: Please call back at [phone] Option #3. [address]   Department, toll free: [phone] Option #3

## 2022-01-05 NOTE — TELEPHONE ENCOUNTER
For [place] in place:  No   Recommendation Provided To: Patient/Caregiver: 1 via Telephone   Intervention Detail: Scheduled Appointment   Gap Closed?: Yes    Intervention Accepted By: Patient/Caregiver: 1   Time Spent (min): 10

## 2022-01-05 NOTE — TELEPHONE ENCOUNTER
Patient called back and scheduled appointment for 1/11/22 at 2:00pm.         Alonzo Smith, [address]   Phone: [phone]

## 2022-01-07 ENCOUNTER — CARE COORDINATION (OUTPATIENT)
Dept: OTHER | Facility: CLINIC | Age: 72
End: 2022-01-07

## 2022-01-07 NOTE — CARE COORDINATION
ACC: Merlyn Jefferson LPN CM Risk Score: 3  Charlson 10 Year Mortality Risk Score: 79%     Care Coordination Interventions    Program Enrollment: Rising Risk  Referral from Primary Care Provider: No  Suggested Interventions and Community Resources       ACM attempted to reach patient for follow up call regarding anxiety, DM. HIPAA compliant message left requesting a return phone call at patients convenience. Will continue to follow. Fátima Munoz, [address] Coordinator  [facility]  [address] [place] [address]  Phone: [phone]  [email or web address]

## 2022-01-11 ENCOUNTER — SCHEDULED TELEPHONE ENCOUNTER (OUTPATIENT)
Dept: PHARMACY | Facility: CLINIC | Age: 72
End: 2022-01-11

## 2022-01-11 RX ORDER — FLASH GLUCOSE SCANNING READER
EACH MISCELLANEOUS
Qty: 1 EACH | Refills: 0 | Status: SHIPPED | OUTPATIENT
Start: 2022-01-11 | End: 2022-01-11 | Stop reason: SDUPTHER

## 2022-01-11 RX ORDER — FLASH GLUCOSE SENSOR
KIT MISCELLANEOUS
Qty: 6 EACH | Refills: 5 | Status: SHIPPED | OUTPATIENT
Start: 2022-01-11 | End: 2022-01-17 | Stop reason: SDUPTHER

## 2022-01-11 RX ORDER — FLASH GLUCOSE SENSOR
KIT MISCELLANEOUS
Qty: 2 EACH | Refills: 5 | Status: SHIPPED | OUTPATIENT
Start: 2022-01-11 | End: 2022-01-11 | Stop reason: SDUPTHER

## 2022-01-11 RX ORDER — FLASH GLUCOSE SCANNING READER
EACH MISCELLANEOUS
Qty: 1 EACH | Refills: 0 | Status: SHIPPED | OUTPATIENT
Start: 2022-01-11 | End: 2022-10-31

## 2022-01-11 NOTE — TELEPHONE ENCOUNTER
Please advise patient that I have sent prescription for danyell freestyle sensor 2   He can send me his download in 2 to 3 weeks after he starts wearing the new danyell.

## 2022-01-11 NOTE — TELEPHONE ENCOUNTER
Patient aware. Patient is wanting to know how much it is going to cost. I explained there is no way for us to know how much it is going to cost but I did explain we did send the prescriptions and he can call his pharmacy to ask. Patient verbalized understanding.

## 2022-01-11 NOTE — TELEPHONE ENCOUNTER
[facility] CLINICAL PHARMACY REVIEW - Be Well with Diabetes    Radhames Gipson is a 66 y.o. male enrolled in the [address] [place] [address],[address] Employee Diabetes Program. Patient provided Vadimsera Davis with verbal consent to remain in the program for this year. Patient enrolled 2018. Medications:  Current Outpatient Medications   Medication Sig Dispense Refill    Continuous Blood Gluc Sensor (FREESTYLE LA 14 DAY SENSOR) MISC USE ONE UNDER THE SKIN EVERY 14 DAYS 6 each 2    insulin lispro, 1 Unit Dial, (HUMALOG KWIKPEN) 100 UNIT/ML SOPN    *reports injecting 8, 7, 7 - still hesitant for 10u despite no BG reading <140   INJECT 10 UNITS UNDER THE SKIN THREE TIMES A DAY BEFORE MEALS 10 pen 1    insulin glargine (LANTUS) 100 UNIT/ML injection vial INJECT 34 UNITS INTO THE SKIN DAILY. (Patient taking differently: INJECT 32 UNITS INTO THE SKIN DAILY. ) 4 vial 1    Insulin Pen Needle (TRUEPLUS PEN NEEDLES) 31G X 8 MM MISC    *Leader / $0; sts adequate size and sufficient qty (for Humalog pen)   USE TO INJECT INSULIN 4 TIMES DAILY.  400 each 1    Insulin Syringe-Needle U-100 (TRUEPLUS INSULIN SYRINGE) 31G X 5/16\" 1 ML MISC     *Leader / $0; sts adequate size and sufficient qty (for Lantus vial - prefers to have as vial v pen)   USE DAILY 100 each 3    losartan (COZAAR) 100 MG tablet TAKE 1 TABLET BY MOUTH ONE TIME A DAY 90 tablet 1    Empagliflozin-metFORMIN HCl ER (SYNJARDY XR)  MG TB24 TAKE 1 TABLET BY MOUTH ONE TIME A DAY 90 tablet 1    atorvastatin (LIPITOR) 20 MG tablet TAKE 1 TABLET BY MOUTH ONE TIME A DAY 90 tablet 1    amLODIPine (NORVASC) 5 MG tablet TAKE 1 TABLET BY MOUTH ONE TIME A DAY 90 tablet 1    omeprazole (PRILOSEC) 40 MG delayed release capsule TAKE 1 CAPSULE BY MOUTH DAILY 90 capsule 1    albuterol sulfate  (90 Base) MCG/ACT inhaler 2 puffs every 6 hours as needed for shortness of breath or wheezing 1 Inhaler 3    Continuous Blood Gluc  (FREESTYLE LA 14 DAY READER) DORA 1 each by Does not apply route daily 1 Device 0    Cholecalciferol (VITAMIN D3) 1000 UNITS CAPS Take 1,000 each by mouth daily.  aspirin EC 81 MG EC tablet    *purchases OTC for $2-4 and ok to continue as OTC Take 1 tablet by mouth daily. 30 tablet 11     Current Pharmacy: [facility]    Allergies:  No Known Allergies     Vitals/Labs:  BP Readings from Last 3 Encounters:   11/30/21 (!) 144/74   11/17/21 (!) 148/98   10/04/21 132/72     Lab Results   Component Value Date    LABMICR 11.70 (H) 09/14/2021     Lab Results   Component Value Date    LABA1C 8.3 09/14/2021    LABA1C 8.7 03/02/2021    LABA1C 8.4 11/09/2020     Lab Results   Component Value Date    CHOL 124 09/14/2021    TRIG 86 09/14/2021    HDL 43 09/14/2021    LDLCALC 64 09/14/2021     ALT   Date Value Ref Range Status   09/14/2021 15 10 - 40 U/L Final     AST   Date Value Ref Range Status   09/14/2021 15 15 - 37 U/L Final     The ASCVD Risk score (Christy Deluna., et al., 2013) failed to calculate for the following reasons:     The valid total cholesterol range is 130 to 320 mg/dL     Lab Results   Component Value Date    CREATININE 0.9 09/14/2021     Immunizations:  Immunization History   Administered Date(s) Administered    COVID-19, J&J, PF, 0.5 mL 03/06/2021, 11/08/2021    Influenza 10/25/2013    Influenza Virus Vaccine 10/11/2010, 09/16/2011, 10/03/2012    Influenza, High Dose (Fluzone 65 yrs and older) 12/03/2015, 10/24/2016, 10/24/2017, 10/17/2018    Influenza, High-dose, Ltanya Julienne, 65 yrs +, IM (Fluzone) 11/02/2020    Influenza, Quadv, adjuvanted, 65 yrs +, IM, PF (Fluad) 09/16/2021    Influenza, Triv, inactivated, subunit, adjuvanted, IM (Fluad 65 yrs and older) 10/17/2019    Pneumococcal Conjugate 13-valent ([id number]) 12/03/2015    Pneumococcal Conjugate 7-valent (Prevnar7) 01/01/2002    Pneumococcal Polysaccharide ([id number]) 12/09/2010, 07/06/2017    Tdap (Boostrix, Adacel) 10/10/2012    Zoster Live (Zostavax) 10/25/2013      Social History:  Social History     Tobacco Use    Smoking status: Former Smoker     Packs/day: 2.00     Years: 15.00     Pack years: 30.00     Types: Cigarettes     Quit date:      Years since quittin.0    Smokeless tobacco: Never Used    Tobacco comment: quit    Substance Use Topics    Alcohol use: Yes     Comment: rarely     ASSESSMENT:  Initial Program Requirements (Y indicates has completed for the year, N indicates needs to be completed by 2022): No - Provider Visit for DM (1st)  No - A1c (1st)     Ongoing Program Requirements (Y indicates has completed for the year, N indicates needs to be completed by 2022): No - Provider Visit for DM (2nd)  No - ACC/diabetes educator visit (if A1c over 8%) - care [facility]  No - A1c (2nd)  No - Lipid panel  No - Urine microalbumin  Yes - Pneumococcal vaccination: up to date  No - Influenza vaccination for 2022  No - Medication adherence over 70%  Yes - On statin or contraindication(s) - prescribed atorvastatin  Yes - On ACEi/ARB or contraindication(s) - prescribed losartan     Current medications eligible for copay waiver, up to $600, through [employer]:  - amlodipine, atorvastatin, Humalog, Lantus, losartan, Synjardy  - [place] Ekaterina and generic pen needles and syringes     Diabetes Care:   - Glycemic Goal: Type 2 DM under inadequate control as evidenced by A1c >8. Did not tolerate Victoza or Trulicity d/t nausea. Is taking Synjardy daily, and Lantus 32u daily as directed; taking Humalog 8u - 7u- 7u TID with meals, which does sound to be a slight increase from how he previously was, but has not increased to 10u as advised d/t continued concern for hypoglycemia. - Any episodes of hypoglycemia? No - Worries about hypoglycemia, so lowers/skips insulin dose or eats when 150 BG d/t concern.  Per 21 endo visit note: \"I had a lengthy discussion with the patient regarding glucose targets and advised him not to correct a glucose value above 90. Client also advised him to take his meal boluses 10 minutes prior to eating even when glucose values are between 100-1 30.\" Patient reports lowest BG @140, most often still 170s or above. - Eye exam current (within one year): yes  - Foot exam current (within one year): yes  - Daily aspirin? Yes - did not have opportunity to discuss or review benefit/risk; appears per Nov endo visit, plan was to continue    Other Considerations:  - Blood Pressure Goal: BP less than 140/90 mmHg due to history of DM: last couple readings around high end of target, likely due to anxiety. Continue to monitor.  - Lipids: taking moderate-intensity statin (atorvastatin 20mg daily); LDL <70  - Adherent to ACEi/ARB and/or statin: confirmed per [employer] fill hx  - Smoking status: former smoker    PLAN:  - Consideration(s) for provider:   · Freestyle Libre2 to replace [employer] 14 day system  · Discussed with patient CGM vs current system and ability to set alerts (uncertain if this would help reassure patient or make him more anxious? He thinks it may help and plans to look more into and ask at endo appt later this month)  · Reviewed that anticipated cost for sensors are the same for his current as for Libre2 (@$130 per 3-month supply)  · Email to [email or web address] re prior authorization for Tamiko Gipson if patient and provider decide to try - received email response that current PA for Rabia Allen 14 day should cover/include Libre2  · FYI routing comment to endocrinology  - DM program gaps identified:   · Initial requirements: Provider Visit for DM (1st) and A1c (1st)   · Ongoing requirements: Provider visit for DM (2nd), ACC/diabetes educator visit (if A1c over 8%), A1c (2nd), Lipid panel, Urine microalbumin, Influenza vaccination for [id number] and Medication adherence over 70%   - Education to patient: @15 min on phone with patient d/t patient had just finished eating, so wanted to wrap up to monitor his BG  · Discussed ways to avoid symptomatic hypoglycemia   · Attempted to follow-up in reassuring patient on insulin/titrating and BG readings as per advice in Nov endo visit  · Reminder A1c and lipid panel can be completed for free at [facility] Well screenings   · Discussed that [facility] Delmy Silverio or carlos a) will contribute to >$600 program benefit, as occurred in     - Upcoming appointments:   Future Appointments   Date Time Provider Nikunj Casanova   2022  2:00 PM Kyle Judd MD [facility] YESY Santos, PharmD, [facility]  Department, toll free: [phone], option 3     =======================================================   For Pharmacy Admin Tracking Only     CPA in place:  No   Recommendation Provided To: Patient/Caregiver: 1 via Telephone   Intervention Detail: Adherence Monitorin   Gap Closed?: Yes    Intervention Accepted By: Patient/Caregiver: 0   Time Spent (min): 30

## 2022-01-12 ENCOUNTER — TELEPHONE (OUTPATIENT)
Dept: ENDOCRINOLOGY | Age: 72
End: 2022-01-12

## 2022-01-13 NOTE — TELEPHONE ENCOUNTER
Submitted PA for [employer] Katya [employer] 14 Day [employer] device  Key: [id number]  Via CMM STATUS: PENDING      Submitted PA for freestyle sensor  Key: [id number]  Via CMM STATUS: PENDING

## 2022-01-17 ENCOUNTER — TELEPHONE (OUTPATIENT)
Dept: ENDOCRINOLOGY | Age: 72
End: 2022-01-17

## 2022-01-17 RX ORDER — FLASH GLUCOSE SENSOR
KIT MISCELLANEOUS
Qty: 6 EACH | Refills: 5 | Status: SHIPPED | OUTPATIENT
Start: 2022-01-17 | End: 2022-03-24

## 2022-01-17 NOTE — TELEPHONE ENCOUNTER
Fax from [address] regarding Prior Auth for [facility] 14 day reader    Need questionnaire completed see scan in media

## 2022-01-18 ENCOUNTER — CARE COORDINATION (OUTPATIENT)
Dept: OTHER | Facility: CLINIC | Age: 72
End: 2022-01-18

## 2022-01-18 NOTE — CARE COORDINATION
Ambulatory Care Coordination Note  1/18/2022  CM Risk Score: 3  Charlson 10 Year Mortality Risk Score: 79%     ACC: Rupali LizarragaSARATH      Spoke to patient, he states his average BG is now around 100, much improved within the past 3-4 months, was previously averaging over 300. Advised him to continue the gradual decrease of his average, as a slower pace will lessen his anxiety of \"too low. \"     This morning FBS was 120- felt great, no s/s. Has been taking 8 units before breakfast, 7 before lunch, 7 before dinner. Appt 1/31- will get fasting labs a few days prior to this appt- plans to discuss GCM. Care Coordination Interventions    Program Enrollment: Rising Risk  Referral from Primary Care Provider: No  Suggested Interventions and Community Resources         Goals Addressed                 This Visit's Progress     Behavioral Health   No change     I will work towards the following Behavioral Health goals: I will continue to follow up with my psychologist /counselor and/or psychiatrist. and I will take my medications daily as prescribed. Barriers: lack of support and lack of education  Plan for overcoming my barriers: I will continue to follow my counselor, Bruno Rodriguez, and be 100% compliant with my Lexapro and take my Hydroxyzine as needed. I will let my PCP, ACM, or my counselor know if I have any concerns. Confidence: 8/10  Anticipated Goal Completion Date: 12/18/21            Prior to Admission medications    Medication Sig Start Date End Date Taking?  Authorizing Provider   Continuous Blood Gluc Sensor (FREESTYLE LA 2 SENSOR) MISC Change every 14 days 1/17/22   Kyle Judd MD   Continuous Blood Gluc  (FREESTYLE LA 2 READER) DORA To check glucose levels 1/11/22   Kyle Judd MD   insulin lispro, 1 Unit Dial, (HUMALOG KWIKPEN) 100 UNIT/ML SOPN INJECT 10 UNITS UNDER THE SKIN THREE TIMES A DAY BEFORE MEALS  Patient taking differently: INJECTING 7-8 UNITS UNDER THE SKIN THREE TIMES A DAY BEFORE MEALS 7/14/21   Darya Calhoun MD   insulin glargine (LANTUS) 100 UNIT/ML injection vial INJECT 34 UNITS INTO THE SKIN DAILY. Patient taking differently: INJECT 32 UNITS INTO THE SKIN DAILY. 7/14/21   Darya Calhoun MD   Insulin Pen Needle (TRUEPLUS PEN NEEDLES) 31G X 8 MM MISC USE TO INJECT INSULIN 4 TIMES DAILY. 7/14/21   Darya Calhoun MD   Insulin Syringe-Needle U-100 (TRUEPLUS INSULIN SYRINGE) 31G X 5/16\" 1 ML MISC USE DAILY 7/14/21   Darya Calhoun MD   losartan (COZAAR) 100 MG tablet TAKE 1 TABLET BY MOUTH ONE TIME A DAY 7/14/21   Darya Calhoun MD   Empagliflozin-metFORMIN HCl ER (SYNJARDY XR)  MG TB24 TAKE 1 TABLET BY MOUTH ONE TIME A DAY 7/14/21   Darya Calhoun MD   atorvastatin (LIPITOR) 20 MG tablet TAKE 1 TABLET BY MOUTH ONE TIME A DAY 7/14/21   Darya Calhoun MD   amLODIPine (NORVASC) 5 MG tablet TAKE 1 TABLET BY MOUTH ONE TIME A DAY 7/14/21   Darya Calhoun MD   omeprazole (PRILOSEC) 40 MG delayed release capsule TAKE 1 CAPSULE BY MOUTH DAILY 7/14/21   Darya Calhoun MD   albuterol sulfate  (90 Base) MCG/ACT inhaler 2 puffs every 6 hours as needed for shortness of breath or wheezing 2/16/21   Maribell Guerrero MD   Cholecalciferol (VITAMIN D3) 1000 UNITS CAPS Take 1,000 each by mouth daily. Historical Provider, MD   aspirin EC 81 MG EC tablet Take 1 tablet by mouth daily. 4/6/12   Di Barnett, DO       Future Appointments   Date Time Provider Nikunj Casanova   1/31/2022  2:00 PM Darya Calhoun MD [facility] Eber Grimaldo. Althea Carolyne, [address] Coordinator  Associate Care Management  [address] [place] [address]  Phone: [phone]  [email or web address]

## 2022-01-20 NOTE — TELEPHONE ENCOUNTER
Another fax from [phone] did not meet our guidelines for the Prior Auth for [facility] 14 day reader

## 2022-01-25 DIAGNOSIS — E66.01 MORBIDLY OBESE (HCC): ICD-10-CM

## 2022-01-25 DIAGNOSIS — E55.9 VITAMIN D DEFICIENCY: ICD-10-CM

## 2022-01-25 DIAGNOSIS — E78.2 MIXED HYPERLIPIDEMIA: ICD-10-CM

## 2022-01-25 LAB
A/G RATIO: 2.1 (ref 1.1–2.2)
ALBUMIN SERPL-MCNC: 4.2 G/DL (ref 3.4–5)
ALP BLD-CCNC: 84 U/L (ref 40–129)
ALT SERPL-CCNC: 13 U/L (ref 10–40)
ANION GAP SERPL CALCULATED.3IONS-SCNC: 16 MMOL/L (ref 3–16)
AST SERPL-CCNC: 14 U/L (ref 15–37)
BILIRUB SERPL-MCNC: 0.4 MG/DL (ref 0–1)
BUN BLDV-MCNC: 16 MG/DL (ref 7–20)
CALCIUM SERPL-MCNC: 9.4 MG/DL (ref 8.3–10.6)
CHLORIDE BLD-SCNC: 99 MMOL/L (ref 99–110)
CHOLESTEROL, TOTAL: 144 MG/DL (ref 0–199)
CO2: 25 MMOL/L (ref 21–32)
CREAT SERPL-MCNC: 1 MG/DL (ref 0.8–1.3)
CREATININE URINE: 41.8 MG/DL (ref 39–259)
GFR AFRICAN AMERICAN: >60
GFR NON-AFRICAN AMERICAN: >60
GLUCOSE BLD-MCNC: 286 MG/DL (ref 70–99)
HDLC SERPL-MCNC: 43 MG/DL (ref 40–60)
LDL CHOLESTEROL CALCULATED: 70 MG/DL
MICROALBUMIN UR-MCNC: 4.1 MG/DL
MICROALBUMIN/CREAT UR-RTO: 98.1 MG/G (ref 0–30)
POTASSIUM SERPL-SCNC: 4.7 MMOL/L (ref 3.5–5.1)
SODIUM BLD-SCNC: 140 MMOL/L (ref 136–145)
TOTAL PROTEIN: 6.2 G/DL (ref 6.4–8.2)
TRIGL SERPL-MCNC: 154 MG/DL (ref 0–150)
TSH SERPL DL<=0.05 MIU/L-ACNC: 2.47 UIU/ML (ref 0.27–4.2)
VITAMIN D 25-HYDROXY: 34.2 NG/ML
VLDLC SERPL CALC-MCNC: 31 MG/DL

## 2022-01-26 LAB
ESTIMATED AVERAGE GLUCOSE: 205.9 MG/DL
HBA1C MFR BLD: 8.8 %

## 2022-01-31 ENCOUNTER — OFFICE VISIT (OUTPATIENT)
Dept: ENDOCRINOLOGY | Age: 72
End: 2022-01-31
Payer: COMMERCIAL

## 2022-01-31 VITALS
HEIGHT: 65 IN | WEIGHT: 202.4 LBS | RESPIRATION RATE: 16 BRPM | DIASTOLIC BLOOD PRESSURE: 78 MMHG | BODY MASS INDEX: 33.72 KG/M2 | SYSTOLIC BLOOD PRESSURE: 152 MMHG | HEART RATE: 98 BPM

## 2022-01-31 PROCEDURE — 95251 CONT GLUC MNTR ANALYSIS I&R: CPT | Performed by: INTERNAL MEDICINE

## 2022-01-31 PROCEDURE — 99215 OFFICE O/P EST HI 40 MIN: CPT | Performed by: INTERNAL MEDICINE

## 2022-01-31 PROCEDURE — 3052F HG A1C>EQUAL 8.0%<EQUAL 9.0%: CPT | Performed by: INTERNAL MEDICINE

## 2022-01-31 NOTE — PROGRESS NOTES
Carla Keenan is a 69 y.o. male who is here for a follow-up for management of uncontrolled DM. Patient has a PMH of Type 2 DM, hypertension, hyperlipidemia , melanoma ( managed by Dr. Molly Flowers), obesity. Eye surgery ( 10/.20 )     Diagnosed with Diabetes Mellitus type 2 since the age of 38 yrs,  Course has been variable . Microvascular complications: No known retinopathy (Last eye exam: 4/18), No nephropathy . No Peripheral neuropathy  No Macrovascular complications. Had a normal CTA coronary arteries in 11/2013. Home regimen:  Lantus 34 units in A.M   Humalog 6-7 units with meals. Synjardy  mg daily. Previous meds : Unable to tolerate victoza. Trulicity ( nausea)Xigduo 5-500 mg daily. Using freestyle danyell  Hypoglycemia : No significant episodes recently. Has Hypoglycemia awareness. Diet:. Eats breakfast at noon, lunch at 5 p.m and dinner at 9 P.M  Had Nutrition education:  No planned exercise. Patient also has hyperlipidemia and is on Lipitor 20 mg daily. Tolerating without side effects    He carries the diagnosis of  HTN for many yrs  Was on Exforge 5-160 mg daily. Now taking losartan 100 mg daily, amlodipine 5 mg daily. Tolerating without side effects    INTERIM  I had a lengthy discussion with the patient again today and we both agreed that he needs to see a psychiatrist for proper treatment of his anxiety and phobias. He has not been leaving home as he is worried that he will have hypoglycemia when in fact he had 0 episodes of hypoglycemia. Patient has severe phobia of hypoglycemia which is hindering his care of diabetes. Patient understands that he is currently not having any hypoglycemia. Today I discussed target glucose values with him in detail and also discussed hypoglycemia management as well as definition of hypoglycemia. He fully understands it and realizes that he ends up correcting glucose way above 150 just to avoid hypoglycemia.   On review of his continuous glucose data there was 0 documented hypoglycemia most glucose values were above 150 ranging up to 350. No Known Allergies  Outpatient Medications Marked as Taking for the 1/31/22 encounter (Office Visit) with John Butcher MD   Medication Sig Dispense Refill    Continuous Blood Gluc  (FREESTYLE LA 2 READER) DORA To check glucose levels 1 each 0    insulin lispro, 1 Unit Dial, (HUMALOG KWIKPEN) 100 UNIT/ML SOPN INJECT 10 UNITS UNDER THE SKIN THREE TIMES A DAY BEFORE MEALS (Patient taking differently: INJECTING 7-8 UNITS UNDER THE SKIN THREE TIMES A DAY BEFORE MEALS) 10 pen 1    insulin glargine (LANTUS) 100 UNIT/ML injection vial INJECT 34 UNITS INTO THE SKIN DAILY. (Patient taking differently: INJECT 32 UNITS INTO THE SKIN DAILY. ) 4 vial 1    Insulin Pen Needle (TRUEPLUS PEN NEEDLES) 31G X 8 MM MISC USE TO INJECT INSULIN 4 TIMES DAILY. 400 each 1    Insulin Syringe-Needle U-100 (TRUEPLUS INSULIN SYRINGE) 31G X 5/16\" 1 ML MISC USE DAILY 100 each 3    losartan (COZAAR) 100 MG tablet TAKE 1 TABLET BY MOUTH ONE TIME A DAY 90 tablet 1    Empagliflozin-metFORMIN HCl ER (SYNJARDY XR)  MG TB24 TAKE 1 TABLET BY MOUTH ONE TIME A DAY 90 tablet 1    atorvastatin (LIPITOR) 20 MG tablet TAKE 1 TABLET BY MOUTH ONE TIME A DAY 90 tablet 1    amLODIPine (NORVASC) 5 MG tablet TAKE 1 TABLET BY MOUTH ONE TIME A DAY 90 tablet 1    omeprazole (PRILOSEC) 40 MG delayed release capsule TAKE 1 CAPSULE BY MOUTH DAILY 90 capsule 1    albuterol sulfate  (90 Base) MCG/ACT inhaler 2 puffs every 6 hours as needed for shortness of breath or wheezing 1 Inhaler 3    Cholecalciferol (VITAMIN D3) 1000 UNITS CAPS Take 1,000 each by mouth daily.  aspirin EC 81 MG EC tablet Take 1 tablet by mouth daily. 30 tablet 11     Body mass index is 33.68 kg/m².      Wt Readings from Last 3 Encounters:   01/31/22 202 lb 6.4 oz (91.8 kg)   11/30/21 203 lb (92.1 kg)   11/17/21 205 lb (93 kg)     BP Readings from Last 3 Encounters:   01/31/22 (!) 152/78   21 (!) 144/74   21 (!) 148/98        Past Medical History:   Diagnosis Date    Asthma     childhood     CAD (coronary artery disease)     Coronary artery calcification seen on CAT scan     GERD (gastroesophageal reflux disease)     Hyperlipidemia     Hypertension     [facility]     Melanoma in situ of skin of buttock ([facility] 2016    RT abdomen    Moderate single current episode of major depressive disorder ([facility] 2017    Obesity     Osteoarthritis     Proteinuria     Salcedo Hunt syndrome     Rosacea     Stricture, esophagus     Type II or unspecified type diabetes mellitus without mention of complication, not stated as uncontrolled     Vitamin D deficiency      Past Surgical History:   Procedure Laterality Date    CATARACT REMOVAL Right 10/2020    CHOLECYSTECTOMY      COLONOSCOPY      TOE AMPUTATION Left     lawnmower accident- 2 toes    UPPER GASTROINTESTINAL ENDOSCOPY      VASECTOMY       Family History   Problem Relation Age of Onset    Cancer Father         kidney    Diabetes Father     Kidney Disease Father     Diabetes Sister     Cancer Paternal Grandfather         melanoma     Social History     Tobacco Use   Smoking Status Former Smoker    Packs/day: 2.00    Years: 15.00    Pack years: 30.00    Types: Cigarettes    Quit date:     Years since quittin.0   Smokeless Tobacco Never Used   Tobacco Comment    quit       Social History     Substance and Sexual Activity   Alcohol Use Yes    Comment: rarely     ROS   I have reviewed the review of system questionnaire filled by the patient .   Patient was advised to contact PCP for non endocrine signs and symptoms       EXAM   Constitutional: no acute distress, well appearing, well nourished  Psychiatric: oriented to person, place and time, judgement, insight and normal, recent and remote memory and intact and mood, affect are normal  Skin: skin and subcutaneous tissue is normal without mass,   Head and Face: examination of head and face revealed no abnormalities  Eyes: no lid or conjunctival swelling, no erythema or discharge, pupils are normal,   Ears/Nose: external inspection of ears and nose revealed no abnormalities, hearing is grossly normal  Oropharynx/Mouth/Face: lips, tongue and gums are normal with no lesions, the voice quality was normal  Neck: neck is supple and symmetric, with midline trachea and no masses, thyroid is normal    Pulmonary: no increased work of breathing or signs of respiratory distress, lungs are clear to auscultation  Cardiovascular: normal heart rate and rhythm, normal S1 and S2,   Musculoskeletal: normal gait and station,   Neurological: normal coordination, normal general cortical function    Lab Results   Component Value Date    LABA1C 8.8 01/25/2022     Lab Results   Component Value Date    .9 01/25/2022       Assessment/Plan      --- Uncontrolled Type 2 DM     Mando Branham is a 66 y.o. male has Type 2 DM with obesity and insulin resistance. Severe Anxiety and phobia of having hypoglycemia  which is hindering diabetes care. As worried about glucose getting low which is not the case in the last few months   He panicks at glucose of 150 and start consuming quick acting carbs. Uncontrolled. Aic 7.8 >>8.3>>8.8  He has not been leaving house die to fear of hypoglycemia   He tends to miss doses of insulin specifically lunchtime doses to avoid low glucose. I had a lengthy discussion with the patient regarding glucose targets and advised him not to correct a glucose value above 90. Client also advised him to take his meal boluses 10 minutes prior to eating even when glucose values are between 100-1 30.    -on  lantus 32 units QAM   -Use Humalog 7-8 units with meals + SSI but mostly takes 6 units with breakfast and does not take any insulin with lunch as he worries about hypoglycemia and takes it after he eats lunch.   He was advised to increase hs meal bolus by 1 unit and slowly uptitrate as it  He has fear of hypoglycemia ,PCP started on esctilopram but he is having sweating and doesn't want to continue it      -Continue Synjardy to  mg daily. Advised to reduce carb intake. Health maintenance     Advised follow-up with the ophthalmologist once a year. last one was oct 2020    Last urine microalbumin/cr ratio high>>98 in jna 2022  in march 2021 pt was told . On losartan elevated    Discussed foot care. Pt on ASA. Former smoker. 2. Hypertension. BP  at goal at home  elevated today as he is upset about his glucose values   Advised to check at home     3. Hyperlipidemia. On statins. Labs in 07/18--> 01/19---> 03/19--> 02/20      4. Melanoma S/p surgery. Diabetes Continuous Glucose Monitoring Report         Reason for Study:     - improve diabetic control without risk of hypoglycemia       Current Medication regimen:   lantus 32 units   7 units with breakfast, typically does not take other boluses and skips boluses if he is leaving home as he is worried about having hypoglycemia. CGMS Report     CGMS data collection was performed on Jan 31, 2022   Patient provided information on his  diet, activities and insulin dosing  during this period. Data was available for 14  days     Sensor Data Report:     - 12 AM to 6 AM: Overnight blood glucose pattern shows stable glycemia   - 6   AM to 10 AM:  Post breakfast hyperglycemia  is noted  --10AM to 5 PM : no  hypoglycemia observed during this time.   - 5   PM to 8 PM: Post meal  is noted        Average reading 214 mg/dL     Standard Dev 24.9   mg/dL   % of time <70 mg/dL 0 %   % of time >180  mg/dL 70 %   % of time within range  30 %  Number of hypoglycemia episodes noted: 0     Impression:   CGMS shows stable glycemia overnite      Recommendation:      Patient was advised to make the following changes in his diabetic regimen  Advised to start taking meal boluses 10 minutes before eating   --- lantus 32 units   Advised to slowly increase meal boluses by 1 unit until he gets up to taking 10 to 15 units of NovoLog with each meal and he will call me back for further instructions. I spent 40  + minutes which includes reviewing patient chart , interpreting previous lab results  , discussing and providing counseling and coordinating care of patient's multiple health issues with  the patient.

## 2022-02-03 ENCOUNTER — CARE COORDINATION (OUTPATIENT)
Dept: OTHER | Facility: CLINIC | Age: 72
End: 2022-02-03

## 2022-02-03 NOTE — CARE COORDINATION
Ambulatory Care Coordination Note  2/3/2022  CM Risk Score: 3  Charlson 10 Year Mortality Risk Score: 79%     ACC: Cheng Ca LPN      Care Coordination Interventions    Program Enrollment: Rising Risk  Referral from Primary Care Provider: No  Suggested Interventions and Community Resources         Spoke to patient, states he had a good visit with Dr. Austyn Felder. He has been now taking 9 unts SA with breakfast, 8 units with lunch, and 8 with dinner. Lantus 32 QAM. No med changes except for small increase in SA. ACM encouraged him to stay compliant with the SA unit dosing, as his A1C was up this time, 8.7 from 8.3. He doesn't feel ready yet to see a Psychiatrist. ACM encouraged him to think about it and how it could benefit his mental and emotional health. CM sent him list of in-network providers- he will review and call me when he is ready to get established. Diabetes education provided today:    Nutrition as a mainstream of diabetes therapy. [place] about label reading. Carbs: good carbs and bad carbs, importance of carb counting, incorporation of protein with each meal to reduce Glycemic index, importance of portions, Carb/insulin ratio. Fats: Good fats and bad fats, meal planning and supplements. Physical activity: advised to exercise 5-7 days a week 30-60 mins at least. Discussed how it affects BS readings. Continuous Glucose monitor. How it works and checks blood sugars every 5 min. for 4 days during our tests. Different diabetic medications. Rotation of sites for subcutaneous medication injection. Goals Addressed                 This Visit's Progress     Behavioral Health   No change     I will work towards the following Behavioral Health goals: I will continue to follow up with my psychologist /counselor and/or psychiatrist. and I will take my medications daily as prescribed.     Barriers: lack of support and lack of education  Plan for overcoming my barriers: I will continue to follow my counselor, Pina Felt, and be 100% compliant with my Lexapro and take my Hydroxyzine as needed. I will let my PCP, ACM, or my counselor know if I have any concerns. Confidence: 8/10  Anticipated Goal Completion Date: 12/18/21            Prior to Admission medications    Medication Sig Start Date End Date Taking? Authorizing Provider   Continuous Blood Gluc Sensor (FREESTYLE LA 2 SENSOR) MISC Change every 14 days 1/17/22   Darya Calhoun MD   Continuous Blood Gluc  (FREESTYLE LA 2 READER) DORA To check glucose levels 1/11/22   Darya Calhoun MD   insulin lispro, 1 Unit Dial, (HUMALOG KWIKPEN) 100 UNIT/ML SOPN INJECT 10 UNITS UNDER THE SKIN THREE TIMES A DAY BEFORE MEALS  Patient taking differently: INJECTING 7-8 UNITS UNDER THE SKIN THREE TIMES A DAY BEFORE MEALS 7/14/21   Darya Calhoun MD   insulin glargine (LANTUS) 100 UNIT/ML injection vial INJECT 34 UNITS INTO THE SKIN DAILY. Patient taking differently: INJECT 32 UNITS INTO THE SKIN DAILY. 7/14/21   Darya Calhoun MD   Insulin Pen Needle (TRUEPLUS PEN NEEDLES) 31G X 8 MM MISC USE TO INJECT INSULIN 4 TIMES DAILY.  7/14/21   Darya Calhoun MD   Insulin Syringe-Needle U-100 (TRUEPLUS INSULIN SYRINGE) 31G X 5/16\" 1 ML MISC USE DAILY 7/14/21   Darya Calhoun MD   losartan (COZAAR) 100 MG tablet TAKE 1 TABLET BY MOUTH ONE TIME A DAY 7/14/21   Darya Calhoun MD   Empagliflozin-metFORMIN HCl ER (SYNJARDY XR)  MG TB24 TAKE 1 TABLET BY MOUTH ONE TIME A DAY 7/14/21   Darya Calhoun MD   atorvastatin (LIPITOR) 20 MG tablet TAKE 1 TABLET BY MOUTH ONE TIME A DAY 7/14/21   Darya Calhoun MD   amLODIPine (NORVASC) 5 MG tablet TAKE 1 TABLET BY MOUTH ONE TIME A DAY 7/14/21   Darya Calhoun MD   omeprazole (PRILOSEC) 40 MG delayed release capsule TAKE 1 CAPSULE BY MOUTH DAILY 7/14/21   Darya Calhoun MD   albuterol sulfate  (90 Base) MCG/ACT inhaler 2 puffs every 6 hours as needed for shortness of breath or wheezing 2/16/21   Maribell Guerrero MD   Cholecalciferol (VITAMIN D3)

## 2022-02-16 ENCOUNTER — CARE COORDINATION (OUTPATIENT)
Dept: OTHER | Facility: CLINIC | Age: 72
End: 2022-02-16

## 2022-02-18 ENCOUNTER — CARE COORDINATION (OUTPATIENT)
Dept: OTHER | Facility: CLINIC | Age: 72
End: 2022-02-18

## 2022-02-18 NOTE — CARE COORDINATION
Ambulatory Care Coordination Note  2/18/2022  CM Risk Score: 3  Charlson 10 Year Mortality Risk Score: 79%     ACC: Marcio Kam LPN    Spoke to patient, states his numbers have been averaging low 200's, has only been taking 8U of SA TID, fearful to go up to 9 or 10U. States in the evening around 10 or 11PM, he gets \"low\" numbers 120-130's, so he eats something. Patient still not leaving his home, has not contacted Psychiatry. ACM encouraged him to think about getting the Dexcom as he would be able to set a low threshold to his preference, recommended 70 or 80. The alarm would help his fear and anxiety of hypoglycemia. He agrees to think about it as it is a little bit more expensive. Care Coordination Interventions    Program Enrollment: Rising Risk  Referral from Primary Care Provider: No  Suggested Interventions and Community Resources         Goals Addressed                 This Visit's Progress     Behavioral Health   No change     I will work towards the following Behavioral Health goals: I will continue to follow up with my psychologist /counselor and/or psychiatrist. and I will take my medications daily as prescribed. Barriers: lack of support and lack of education  Plan for overcoming my barriers: I will continue to follow my counselor, Johann Diaz, and be 100% compliant with my Lexapro and take my Hydroxyzine as needed. I will let my PCP, ACM, or my counselor know if I have any concerns. Confidence: 8/10  Anticipated Goal Completion Date: 12/18/21            Prior to Admission medications    Medication Sig Start Date End Date Taking?  Authorizing Provider   Continuous Blood Gluc Sensor (FREESTYLE LA 2 SENSOR) MISC Change every 14 days 1/17/22   Ahmet Lopez MD   Continuous Blood Gluc  (FREESTYLE LA 2 READER) DORA To check glucose levels 1/11/22   Ahmet Lopez MD   insulin lispro, 1 Unit Dial, (HUMALOG KWIKPEN) 100 UNIT/ML SOPN INJECT 10 UNITS UNDER THE SKIN THREE TIMES A DAY BEFORE MEALS  Patient taking differently: INJECTING 7-8 UNITS UNDER THE SKIN THREE TIMES A DAY BEFORE MEALS 7/14/21   Sebastian Ferrell MD   insulin glargine (LANTUS) 100 UNIT/ML injection vial INJECT 34 UNITS INTO THE SKIN DAILY. Patient taking differently: INJECT 32 UNITS INTO THE SKIN DAILY. 7/14/21   Sebastian Ferrell MD   Insulin Pen Needle (TRUEPLUS PEN NEEDLES) 31G X 8 MM MISC USE TO INJECT INSULIN 4 TIMES DAILY. 7/14/21   Sebastian Ferrell MD   Insulin Syringe-Needle U-100 (TRUEPLUS INSULIN SYRINGE) 31G X 5/16\" 1 ML MISC USE DAILY 7/14/21   Sebastian Ferrell MD   losartan (COZAAR) 100 MG tablet TAKE 1 TABLET BY MOUTH ONE TIME A DAY 7/14/21   Sebastian Ferrell MD   Empagliflozin-metFORMIN HCl ER (SYNJARDY XR)  MG TB24 TAKE 1 TABLET BY MOUTH ONE TIME A DAY 7/14/21   Sebastian Ferrell MD   atorvastatin (LIPITOR) 20 MG tablet TAKE 1 TABLET BY MOUTH ONE TIME A DAY 7/14/21   Sebastian Ferrell MD   amLODIPine (NORVASC) 5 MG tablet TAKE 1 TABLET BY MOUTH ONE TIME A DAY 7/14/21   Sebastian Ferrell MD   omeprazole (PRILOSEC) 40 MG delayed release capsule TAKE 1 CAPSULE BY MOUTH DAILY 7/14/21   Sebastian Ferrell MD   albuterol sulfate  (90 Base) MCG/ACT inhaler 2 puffs every 6 hours as needed for shortness of breath or wheezing 2/16/21   Lenora Veloz MD   Cholecalciferol (VITAMIN D3) 1000 UNITS CAPS Take 1,000 each by mouth daily. Historical Provider, MD   aspirin EC 81 MG EC tablet Take 1 tablet by mouth daily. 4/6/12   Larisa Herron, DO       Future Appointments   Date Time Provider Nikunj Casanova   6/6/2022  2:20 PM Sebastian Ferrell MD [facility] Claudy. Mena Ordaz, [address] Coordinator  Associate Care Management  [address] [place] [address], [address]  Phone: [phone]  [email or web address]

## 2022-02-23 DIAGNOSIS — E11.9 DIABETES MELLITUS WITHOUT COMPLICATION (HCC): ICD-10-CM

## 2022-02-23 DIAGNOSIS — I10 BENIGN ESSENTIAL HTN: ICD-10-CM

## 2022-02-23 DIAGNOSIS — E78.2 MIXED HYPERLIPIDEMIA: ICD-10-CM

## 2022-02-24 RX ORDER — OMEPRAZOLE 40 MG/1
CAPSULE, DELAYED RELEASE ORAL
Qty: 90 CAPSULE | Refills: 1 | Status: SHIPPED | OUTPATIENT
Start: 2022-02-24 | End: 2022-08-03

## 2022-02-24 RX ORDER — LOSARTAN POTASSIUM 100 MG/1
TABLET ORAL
Qty: 90 TABLET | Refills: 1 | Status: SHIPPED | OUTPATIENT
Start: 2022-02-24 | End: 2022-08-03

## 2022-02-24 RX ORDER — EMPAGLIFLOZIN, METFORMIN HYDROCHLORIDE 25; 1000 MG/1; MG/1
TABLET, EXTENDED RELEASE ORAL
Qty: 90 TABLET | Refills: 1 | Status: SHIPPED | OUTPATIENT
Start: 2022-02-24 | End: 2022-08-03

## 2022-02-24 RX ORDER — AMLODIPINE BESYLATE 5 MG/1
TABLET ORAL
Qty: 90 TABLET | Refills: 1 | Status: SHIPPED | OUTPATIENT
Start: 2022-02-24 | End: 2022-08-03

## 2022-02-24 RX ORDER — INSULIN GLARGINE 100 [IU]/ML
INJECTION, SOLUTION SUBCUTANEOUS
Qty: 40 ML | Refills: 1 | Status: SHIPPED | OUTPATIENT
Start: 2022-02-24 | End: 2022-08-03

## 2022-02-24 RX ORDER — ATORVASTATIN CALCIUM 20 MG/1
TABLET, FILM COATED ORAL
Qty: 90 TABLET | Refills: 1 | Status: SHIPPED | OUTPATIENT
Start: 2022-02-24 | End: 2022-08-03

## 2022-02-24 RX ORDER — INSULIN LISPRO 100 [IU]/ML
INJECTION, SOLUTION INTRAVENOUS; SUBCUTANEOUS
Qty: 30 ML | Refills: 1 | Status: SHIPPED | OUTPATIENT
Start: 2022-02-24 | End: 2022-08-03

## 2022-03-07 ENCOUNTER — TELEPHONE (OUTPATIENT)
Dept: PHARMACY | Facility: CLINIC | Age: 72
End: 2022-03-07

## 2022-03-07 NOTE — TELEPHONE ENCOUNTER
[facility] CLINICAL PHARMACY REVIEW - BE WELL WITH DIABETES: HIGH A1C  =============================================  Kenna Gallego is a 66 y.o. male enrolled in the [address] [place] [address],[address] Be Well with Diabetes program.    Identified care gap(s): A1c > 8%    DM Program Prescriptions:  Current Outpatient Medications   Medication Sig Dispense Refill    amLODIPine (NORVASC) 5 MG tablet TAKE 1 TABLET BY MOUTH ONE TIME A DAY 90 tablet 1    SYNJARDY XR  MG TB24 TAKE 1 TABLET BY MOUTH ONE TIME A DAY 90 tablet 1    insulin glargine (LANTUS) 100 UNIT/ML injection vial INJECT 34 UNITS UNDER THE SKIN DAILY 40 mL 1    atorvastatin (LIPITOR) 20 MG tablet TAKE 1 TABLET BY MOUTH ONE TIME A DAY 90 tablet 1    insulin lispro, 1 Unit Dial, (HUMALOG KWIKPEN) 100 UNIT/ML SOPN INJECT 10 UNITS UNDER THE SKIN THREE TIMES A DAY BEFORE MEALS 30 mL 1    losartan (COZAAR) 100 MG tablet TAKE 1 TABLET BY MOUTH ONE TIME A DAY 90 tablet 1    omeprazole (PRILOSEC) 40 MG delayed release capsule TAKE 1 CAPSULE BY MOUTH ONE TIME A DAY 90 capsule 1    Continuous Blood Gluc Sensor (FREESTYLE LA 2 SENSOR) MISC Change every 14 days 6 each 5    Continuous Blood Gluc  (FREESTYLE LA 2 READER) DORA To check glucose levels 1 each 0    Insulin Pen Needle (TRUEPLUS PEN NEEDLES) 31G X 8 MM MISC USE TO INJECT INSULIN 4 TIMES DAILY. 400 each 1    Insulin Syringe-Needle U-100 (TRUEPLUS INSULIN SYRINGE) 31G X 5/16\" 1 ML MISC USE DAILY 100 each 3    albuterol sulfate  (90 Base) MCG/ACT inhaler 2 puffs every 6 hours as needed for shortness of breath or wheezing 1 Inhaler 3    Cholecalciferol (VITAMIN D3) 1000 UNITS CAPS Take 1,000 each by mouth daily.  aspirin EC 81 MG EC tablet Take 1 tablet by mouth daily. 30 tablet 11     No current facility-administered medications for this visit.         Allergies:  No Known Allergies     Labs:  Lab Results   Component Value Date    LABA1C 8.8 01/25/2022    LABA1C 8.3 09/14/2021 LABA1C 8.7 03/02/2021     Estimated Creatinine Clearance: 70 mL/min (based on SCr of 1 mg/dL). - Upcoming appointments:   Future Appointments   Date Time Provider Nikunj Casanova   6/6/2022  2:20 PM MD Oma Dawkins [facility]       Diabetes Care:  - Glycemic Goal: directed by provider. Is not at blood glucose goal has a fear of lows and uses less insulin than prescribed. .   - Appropriateness of Insulin Therapy: sees mahsa, is on long and short but he is so scared his sugar will do low so does not give correct prescribed doses  - Therapy Optimization: use danyell 2 system to help patient get more comfortable he will not have dangerous low sugars  - Previous meds : Unable to tolerate victoza. Trulicity ( nausea)Xigduo 5-500 mg daily. Plan:    Reached patient to review. Barrier(s) identified: scared sugar will go low so keeps them higher     Patient filled 14 day danyell instead of danyell 2 and patient is scared to have low sugars which prevents him A1c from being at goal. [facility] states when the called patient he said to fill the 14 day. Trying to reach patient to discuss and make sure he understands this device has the capability to alarm him if his sugars go to high or low. Also wanted to clarify if he wants [employer] reader or will use his phone. He does use a reader for the 14 day. He states he read some reviews online about the "[employer] 2 system and decided not to get it. Discussed it is the same system he is currently using, would function the exact same way with the added safety function of the alarms of hi or low. Patient wanted to set low to 100 and states he will eat something sugary when it is 100. But even when it is 240, like it was during our call, patient extremely worried it will drop. Educated patient his high sugars are doing more damage than a normal sugar of 100. Patient states he is aware of this but still cant get over this fear. Patient does not even want to leave the house due to this fear. Patient does see mahsa who recommended phychiatry to patient. Patient has not follow up regarding this referral.     Patient states he will try danyell 2 but He just filled 14 day in Feb, [facility] states can fill danyell 2,  On 4/14. Patient asking for a return call on 4/14 so we can help make sure reader and sensor for 2 are being filled this day.       Vidal Robison, PharmD, [address] Pharmacist  Department: [address] Only     CPA in place:  No   Recommendation Provided To: Patient/Caregiver: 1 via Telephone   Gap Closed?: Yes    Intervention Accepted By: Patient/Caregiver: 1   Time Spent (min): 20

## 2022-03-08 ENCOUNTER — TELEPHONE (OUTPATIENT)
Dept: ENDOCRINOLOGY | Age: 72
End: 2022-03-08

## 2022-03-08 NOTE — TELEPHONE ENCOUNTER
Submitted PA for . [employer] 2 [place] device  Key: [id number]       Via CMM STATUS: PENDING

## 2022-03-10 NOTE — TELEPHONE ENCOUNTER
Fax back from [address]    W/ questions to answer regarding the Prior Auth for the [facility] [address] [place]

## 2022-03-18 ENCOUNTER — CARE COORDINATION (OUTPATIENT)
Dept: OTHER | Facility: CLINIC | Age: 72
End: 2022-03-18

## 2022-03-18 NOTE — CARE COORDINATION
Ambulatory Care Coordination Note  3/18/2022  CM Risk Score: 3  Charlson 10 Year Mortality Risk Score: 79%     ACC: Juan Briscoe LPN      Spoke to patient. Continuing to fear any BG reading under 200. Taking 8 units TID with meals. Mid afternoon averages 280-300's. Patient knows this is high but prefers this over lower. Lengthy discussion of encouragement:    - Get his quality of life back, leaving his home, participating in hobbies he enjoys   - Stop letting diabetes control his life- he will learn to control his DM  - Set up appointment with Mary Zaidi Psychologist at PCP office. Patient agrees to call to schedule with Kristopher MCKEON to f/u 3/23 afternoon to ensure he has something scheduled. Care Coordination Interventions    Program Enrollment: Rising Risk  Referral from Primary Care Provider: No  Suggested Interventions and Community Resources         Goals Addressed                 This Visit's Progress     Behavioral Health   No change     I will work towards the following Behavioral Health goals: I will continue to follow up with my psychologist /counselor and/or psychiatrist. and I will take my medications daily as prescribed. Barriers: lack of support and lack of education  Plan for overcoming my barriers: I will continue to follow my counselor, Mary Zaidi, and be 100% compliant with my Lexapro and take my Hydroxyzine as needed. I will let my PCP, ACM, or my counselor know if I have any concerns. Confidence: 8/10  Anticipated Goal Completion Date: 12/18/21            Prior to Admission medications    Medication Sig Start Date End Date Taking?  Authorizing Provider   amLODIPine (NORVASC) 5 MG tablet TAKE 1 TABLET BY MOUTH ONE TIME A DAY 2/24/22   Adelaide Mendez MD   SYNJARDY XR  MG TB24 TAKE 1 TABLET BY MOUTH ONE TIME A DAY 2/24/22   Adelaide Mendez MD   insulin glargine (LANTUS) 100 UNIT/ML injection vial INJECT 34 UNITS UNDER THE SKIN DAILY 2/24/22   Adelaide Mendez MD   atorvastatin (LIPITOR) 20 MG tablet TAKE 1 TABLET BY MOUTH ONE TIME A DAY 2/24/22   Mikaela Christine MD   insulin lispro, 1 Unit Dial, (HUMALOG KWIKPEN) 100 UNIT/ML SOPN INJECT 10 UNITS UNDER THE SKIN THREE TIMES A DAY BEFORE MEALS 2/24/22   Mikaela Christine MD   losartan (COZAAR) 100 MG tablet TAKE 1 TABLET BY MOUTH ONE TIME A DAY 2/24/22   Mikaela Christine MD   omeprazole (PRILOSEC) 40 MG delayed release capsule TAKE 1 CAPSULE BY MOUTH ONE TIME A DAY 2/24/22   Mikaela Christine MD   Continuous Blood Gluc Sensor (FREESTYLE LA 2 SENSOR) MISC Change every 14 days 1/17/22   Mikaela Christine MD   Continuous Blood Gluc  (FREESTYLE LA 2 READER) DORA To check glucose levels 1/11/22   Mikaela Christine MD   Insulin Pen Needle (TRUEPLUS PEN NEEDLES) 31G X 8 MM MISC USE TO INJECT INSULIN 4 TIMES DAILY. 7/14/21   Mikaela Christine MD   Insulin Syringe-Needle U-100 (TRUEPLUS INSULIN SYRINGE) 31G X 5/16\" 1 ML MISC USE DAILY 7/14/21   Mikaela Christine MD   albuterol sulfate  (90 Base) MCG/ACT inhaler 2 puffs every 6 hours as needed for shortness of breath or wheezing 2/16/21   Eloy Phoenix, MD   Cholecalciferol (VITAMIN D3) 1000 UNITS CAPS Take 1,000 each by mouth daily. Historical Provider, MD   aspirin EC 81 MG EC tablet Take 1 tablet by mouth daily. 4/6/12   [facility],        Future Appointments   Date Time Provider Nikunj Casanova   6/6/2022  2:20 PM Mikaela Christine MD [facility] Eber Lorenzo. Linzy Homans, [address] Coordinator  Associate Care Management  [address] [place] [address], [address] [place] [address]  Phone: [phone]  [email or web address]

## 2022-03-22 ENCOUNTER — TELEPHONE (OUTPATIENT)
Dept: PHARMACY | Facility: CLINIC | Age: 72
End: 2022-03-22

## 2022-03-24 ENCOUNTER — TELEPHONE (OUTPATIENT)
Dept: PHARMACY | Facility: CLINIC | Age: 72
End: 2022-03-24

## 2022-03-24 RX ORDER — FLASH GLUCOSE SENSOR
KIT MISCELLANEOUS
Qty: 6 EACH | Refills: 2 | Status: SHIPPED | OUTPATIENT
Start: 2022-03-24 | End: 2022-10-31

## 2022-03-24 NOTE — TELEPHONE ENCOUNTER
[facility] CLINICAL PHARMACY REVIEW - BE WELL WITH DIABETES  =============================================  Yaa Cervantes is a 66 y.o. male enrolled in the [address] [place] [address],[address] Be Well with Diabetes program.  Patient returned call and discussed the following:    Patient was working with Kat Lambert and decided to start Hoover 2. Initially wanted sensors only and changed mind and decided he wanted the reader also. Messaged P and they have reactivated the reader prescription. Both the reader and sensors are set to process on 4/14/22. Attempted to call patient to let him know everything is in place with his Hoover 2 fills next month. No answer. Unable to leave message. Will route back to Kat Lambert as OLAF Oropeza, PharmD, [facility]  Department, toll free: [phone]      For Pharmacy Admin Tracking Only   Time Spent (min): 10

## 2022-04-06 LAB
CHOLESTEROL, TOTAL: 150 MG/DL (ref 0–199)
GLUCOSE BLD-MCNC: 263 MG/DL (ref 70–99)
HDLC SERPL-MCNC: 44 MG/DL (ref 40–60)
LDL CHOLESTEROL CALCULATED: 84 MG/DL
TRIGL SERPL-MCNC: 108 MG/DL (ref 0–150)

## 2022-04-07 LAB
ESTIMATED AVERAGE GLUCOSE: 214.5 MG/DL
HBA1C MFR BLD: 9.1 %

## 2022-04-14 ENCOUNTER — TELEPHONE (OUTPATIENT)
Dept: PHARMACY | Facility: CLINIC | Age: 72
End: 2022-04-14

## 2022-04-14 NOTE — TELEPHONE ENCOUNTER
POPULATION HEALTH CLINICAL PHARMACY REVIEW - BE WELL WITH DIABETES: HIGH A1C  =============================================  Antoine Patterson is a 66 y.o. male enrolled in the [facility] [place] Be Well with Diabetes program.    Identified care gap(s): A1c > 9%    DM Program Prescriptions:  Current Outpatient Medications   Medication Sig Dispense Refill    Continuous Blood Gluc Sensor (FREESTYLE LA 14 DAY SENSOR) MISC USE AS DIRECTED CHANGE EVERY 14 DAYS 6 each 2    amLODIPine (NORVASC) 5 MG tablet TAKE 1 TABLET BY MOUTH ONE TIME A DAY 90 tablet 1    SYNJARDY XR  MG TB24 TAKE 1 TABLET BY MOUTH ONE TIME A DAY 90 tablet 1    insulin glargine (LANTUS) 100 UNIT/ML injection vial INJECT 34 UNITS UNDER THE SKIN DAILY 40 mL 1    atorvastatin (LIPITOR) 20 MG tablet TAKE 1 TABLET BY MOUTH ONE TIME A DAY 90 tablet 1    insulin lispro, 1 Unit Dial, (HUMALOG KWIKPEN) 100 UNIT/ML SOPN INJECT 10 UNITS UNDER THE SKIN THREE TIMES A DAY BEFORE MEALS 30 mL 1    losartan (COZAAR) 100 MG tablet TAKE 1 TABLET BY MOUTH ONE TIME A DAY 90 tablet 1    omeprazole (PRILOSEC) 40 MG delayed release capsule TAKE 1 CAPSULE BY MOUTH ONE TIME A DAY 90 capsule 1    Continuous Blood Gluc  (FREESTYLE LA 2 READER) DORA To check glucose levels 1 each 0    Insulin Pen Needle (TRUEPLUS PEN NEEDLES) 31G X 8 MM MISC USE TO INJECT INSULIN 4 TIMES DAILY. 400 each 1    Insulin Syringe-Needle U-100 (TRUEPLUS INSULIN SYRINGE) 31G X 5/16\" 1 ML MISC USE DAILY 100 each 3    albuterol sulfate  (90 Base) MCG/ACT inhaler 2 puffs every 6 hours as needed for shortness of breath or wheezing 1 Inhaler 3    Cholecalciferol (VITAMIN D3) 1000 UNITS CAPS Take 1,000 each by mouth daily.  aspirin EC 81 MG EC tablet Take 1 tablet by mouth daily. 30 tablet 11     No current facility-administered medications for this visit.         Allergies:  No Known Allergies     Labs:  Lab Results   Component Value Date    LABA1C 9.1 04/06/2022 LABA1C 8.8 01/25/2022    LABA1C 8.3 09/14/2021     Estimated Creatinine Clearance: 70 mL/min (based on SCr of 1 mg/dL). Care Team:   - Physician (endocrinologist):   - Upcoming appointments:   Future Appointments   Date Time Provider Nikunj Gonzalezi   6/6/2022  2:20 PM Heidi Gonzalez MD [facility]     Diabetes Care:  - Glycemic Goal: <7.0% and directed by provider. Is not at blood glucose goal but is on insulin therapy. .   - Appropriateness of Insulin Therapy: Patient has a huge fear of hypoglycemia and prefers to let his sugars run high. He is on basal and bolus per endo but does not give as much as is prescribed  - Therapy Optimization: was recommended to see physiatry but is not interested. Will get Usama [employer] 2 soon and I am hoping the alarms will give him some confidence. - Medication changes since last A1c: just had be well appt and A1c worsened but has not seen provider yet after this A1c. Has follow up in June    Plan:    Reached patient for review. Patient wanted a return call to make sure freestyle danyell 2 was in process at [facility]. He does want reader and sensors. Both are in process. He states current sensors are not reading correctly this has never done this to him. He says it is reading about 40-50 lower on the sensor than his meter. He is aware CGM tests interstitial fluid and there will be a variance. He is hoping new sensors will not have such a large variance. He is still not leaving the house but has a goal to go to a shopping center that is 5 min from his house and walk around and get a coffee. He does carry glucose tabs but had never heard of glucagon. Discussed injection and nasal glucagon. He will research glucagon and see if this is something he would like to have on hand. Is doing lantus 32 units in AM. Use to do 34 units but lowered it to 32. And is taking humalog 8 units with each meal and is going to try to take 9 units with each meal consistently.  Encouraged patient to continue to titrate insulin doses to get to prescribed doses. He states he is really going to try hard with this new danyell 2 to get more confident in controlling his sugars and will see what next a1c is after having danyell 2 for some time.       Gisell Mcdowell, PharmD, [address] Pharmacist  Department: [phone]    For Pharmacy Admin Tracking Only     Gap Closed?: Yes    Time Spent (min): 20

## 2022-04-18 ENCOUNTER — CARE COORDINATION (OUTPATIENT)
Dept: OTHER | Facility: CLINIC | Age: 72
End: 2022-04-18

## 2022-04-18 NOTE — CARE COORDINATION
ACM attempted to reach patient for follow up call regarding Freestyle Selene 2, did he get yet?, insulin doses, counseling? Angel Ou HIPAA compliant message left requesting a return phone call at patients convenience. Will continue to follow. Fátima Ledezma, [address] [facility] Coordinator  Associate Care Management  [address] [place] [address] Street  Phone: [phone]  [email or web address]

## 2022-05-02 ENCOUNTER — CARE COORDINATION (OUTPATIENT)
Dept: OTHER | Facility: CLINIC | Age: 72
End: 2022-05-02

## 2022-05-02 NOTE — CARE COORDINATION
Ambulatory Care Coordination Note  2022  CM Risk Score: 3  [place] 10 Year Mortality Risk Score: 79%     ACC: Rand Fabry, LPN    Ambulatory Care Manager (ACM) contacted the patient by telephone to follow up on progress, discuss new issues or concerns, and reinforce/ provide patient education. Verified name and  with patient as identifiers. Spoke to patient- states he did get the [facility] [id number], is able to get more sleep at night. Feels somewhat safer, less anxious that he can set an alarm. Has it set for 100. However, his fasting sugars are still over 200 daily. He takes 8 units TID with meals, one night madrigal, he took 9 units, and awoke with blood sugar of 160, went down to 140. Patient then became very anxious and ate a carb-loaded breakfast. ACM provided reassurance that 140 is much better than 200. Also reminded him that his sensor will alarm at 100. He agrees to try to take 9 units more often and also agrees to contact the Psychologist within his PCPs office. Reinforced/ Provided Education:  Discussed red flags and appropriate site of care based on symptoms and resources available to patient including: PCP  Specialist  Benefits related nurse triage line  When to call 911. Importance and benefits of: Follow up with PCP and specialist, medication adherence, self monitoring and reporting of symptoms. Plan:  Continue biweekly outreaches to provide telephonic support, education and resources as needed. Discuss / follow up on: Goal progress, anxiety     Pt verbalized understanding and is agreeable to follow up contact.        Care Coordination Interventions    Program Enrollment: Rising Risk  Referral from Primary Care Provider: No  Suggested Interventions and Community Resources         Goals Addressed                 This Visit's Progress     Behavioral Health   Improving     I will work towards the following Behavioral Health goals: I will continue to follow up with my psychologist /counselor and/or psychiatrist. and I will take my medications daily as prescribed. Barriers: lack of support and lack of education  Plan for overcoming my barriers: I will continue to follow my counselor, Justin Gunter, and be 100% compliant with my Lexapro and take my Hydroxyzine as needed. I will let my PCP, ACM, or my counselor know if I have any concerns. Confidence: 8/10  Anticipated Goal Completion Date: 12/18/21            Prior to Admission medications    Medication Sig Start Date End Date Taking? Authorizing Provider   Continuous Blood Gluc Sensor (FREESTYLE LA 14 DAY SENSOR) MISC USE AS DIRECTED CHANGE EVERY 14 DAYS 3/24/22  Yes Zak Hylton MD   amLODIPine (NORVASC) 5 MG tablet TAKE 1 TABLET BY MOUTH ONE TIME A DAY 2/24/22  Yes Zak Hylton MD   SYNJARDY XR  MG TB24 TAKE 1 TABLET BY MOUTH ONE TIME A DAY 2/24/22  Yes Zak Hylton MD   insulin glargine (LANTUS) 100 UNIT/ML injection vial INJECT 34 UNITS UNDER THE SKIN DAILY 2/24/22  Yes Zak Hylton MD   atorvastatin (LIPITOR) 20 MG tablet TAKE 1 TABLET BY MOUTH ONE TIME A DAY 2/24/22  Yes Zak Hylton MD   insulin lispro, 1 Unit Dial, (HUMALOG KWIKPEN) 100 UNIT/ML SOPN INJECT 10 UNITS UNDER THE SKIN THREE TIMES A DAY BEFORE MEALS 2/24/22  Yes Zak Hylton MD   losartan (COZAAR) 100 MG tablet TAKE 1 TABLET BY MOUTH ONE TIME A DAY 2/24/22  Yes Zak Hylton MD   omeprazole (PRILOSEC) 40 MG delayed release capsule TAKE 1 CAPSULE BY MOUTH ONE TIME A DAY 2/24/22  Yes Zak Hylton MD   Continuous Blood Gluc  (FREESTYLE LA 2 READER) DORA To check glucose levels 1/11/22  Yes Zak Hylton MD   Insulin Pen Needle (TRUEPLUS PEN NEEDLES) 31G X 8 MM MISC USE TO INJECT INSULIN 4 TIMES DAILY.  7/14/21  Yes Zak Hylton MD   Insulin Syringe-Needle U-100 (TRUEPLUS INSULIN SYRINGE) 31G X 5/16\" 1 ML MISC USE DAILY 7/14/21  Yes Zak Hylton MD   albuterol sulfate  (90 Base) MCG/ACT inhaler 2 puffs every 6 hours as needed for shortness of breath or wheezing 2/16/21  Yes Minerva Hung MD   Cholecalciferol (VITAMIN D3) 1000 UNITS CAPS Take 1,000 each by mouth daily. Yes Historical Provider, MD   aspirin EC 81 MG EC tablet Take 1 tablet by mouth daily. 4/6/12  Yes Patti Shay, DO       Future Appointments   Date Time Provider Nikunj Casanova   6/6/2022  2:20 PM Yarely Tang MD [facility]         Yokasta Zamudio. Jackie Soto, [address] Coordinator  Associate Care Management  [address] [place] [address]  Phone: [phone]  [email or web address]

## 2022-05-12 RX ORDER — PEN NEEDLE, DIABETIC 31 GX5/16"
NEEDLE, DISPOSABLE MISCELLANEOUS
Qty: 400 EACH | Refills: 5 | Status: SHIPPED | OUTPATIENT
Start: 2022-05-12

## 2022-05-17 RX ORDER — BLOOD SUGAR DIAGNOSTIC
STRIP MISCELLANEOUS
Qty: 800 STRIP | Refills: 5 | Status: SHIPPED | OUTPATIENT
Start: 2022-05-17

## 2022-05-26 ENCOUNTER — TELEPHONE (OUTPATIENT)
Dept: PHARMACY | Facility: CLINIC | Age: 72
End: 2022-05-26

## 2022-05-26 NOTE — TELEPHONE ENCOUNTER
[facility] CLINICAL PHARMACY REVIEW - BE WELL WITH DIABETES: HIGH A1C  =============================================  Kayleigh Gaston is a 66 y.o. male enrolled in the [address] [place] [address],[address] Be Well with Diabetes program.    Identified care gap(s): A1c > 9%    DM Program Prescriptions:  Current Outpatient Medications   Medication Sig Dispense Refill    AGAMATRIX PRESTO TEST strip TEST BLOOD SUGAR 8 TIMES A  strip 5    Insulin Pen Needle (TRUEPLUS PEN NEEDLES) 31G X 8 MM MISC USE TO INJECT INSULIN FOUR TIMES A  each 5    Continuous Blood Gluc Sensor (FREESTYLE LA 14 DAY SENSOR) MISC USE AS DIRECTED CHANGE EVERY 14 DAYS 6 each 2    amLODIPine (NORVASC) 5 MG tablet TAKE 1 TABLET BY MOUTH ONE TIME A DAY 90 tablet 1    SYNJARDY XR  MG TB24 TAKE 1 TABLET BY MOUTH ONE TIME A DAY 90 tablet 1    insulin glargine (LANTUS) 100 UNIT/ML injection vial INJECT 34 UNITS UNDER THE SKIN DAILY 40 mL 1    atorvastatin (LIPITOR) 20 MG tablet TAKE 1 TABLET BY MOUTH ONE TIME A DAY 90 tablet 1    insulin lispro, 1 Unit Dial, (HUMALOG KWIKPEN) 100 UNIT/ML SOPN INJECT 10 UNITS UNDER THE SKIN THREE TIMES A DAY BEFORE MEALS 30 mL 1    losartan (COZAAR) 100 MG tablet TAKE 1 TABLET BY MOUTH ONE TIME A DAY 90 tablet 1    omeprazole (PRILOSEC) 40 MG delayed release capsule TAKE 1 CAPSULE BY MOUTH ONE TIME A DAY 90 capsule 1    Continuous Blood Gluc  (FREESTYLE LA 2 READER) DORA To check glucose levels 1 each 0    Insulin Syringe-Needle U-100 (TRUEPLUS INSULIN SYRINGE) 31G X 5/16\" 1 ML MISC USE DAILY 100 each 3    albuterol sulfate  (90 Base) MCG/ACT inhaler 2 puffs every 6 hours as needed for shortness of breath or wheezing 1 Inhaler 3    Cholecalciferol (VITAMIN D3) 1000 UNITS CAPS Take 1,000 each by mouth daily.  aspirin EC 81 MG EC tablet Take 1 tablet by mouth daily. 30 tablet 11     No current facility-administered medications for this visit.         Allergies:  No Known Allergies     Labs:  Lab Results   Component Value Date    LABA1C 9.1 04/06/2022    LABA1C 8.8 01/25/2022    LABA1C 8.3 09/14/2021     Estimated Creatinine Clearance: 70 mL/min (based on SCr of 1 mg/dL). - Upcoming appointments:   Future Appointments   Date Time Provider Nikunj Casanova   6/6/2022  2:20 PM Osmani Peñaloza MD [facility]       Diabetes Care:  - Glycemic Goal: <7.0% and directed by provider. Is not at blood glucose goal but unable or unwilling to increase dose or take additional medication. .   - Appropriateness of Insulin Therapy: basal and bolus, pt non adherent to insulin doses due to anxiety of going too low  - Therapy Optimization: is also following the ACM, suppose to take humalog 10 untis with meals however he takes 8 units and sugars are still in 200's. Plan:    Reached patient for review. How is he liking the [place] 2? Is this helping him be more confident to increase his dose of his humalog? He is using reader and would also like to get his phone set up as a reader. But he states you can only have one device that actually alarms. Is giving humalog 8 units with meals, is still afraid to take 9 units     When gets to 150-160 he eats crackers to get it to 180-200 due to his anxiety but the last 2 nights he did not. What dose of lantus is he giving? Is giving 32 units of lantus, educated this has been increased to 34 units, pt states it has not. What are his [place] reading averages? 7 day avg 230  14 day avg 227  30 day avg 233  90 day avg 230    Usually checks BS every 2 hrs throughout the night. Couple nights he slept and did not check it from 230 to 350 [facility] seems to read about 30 points over fingerstick which should provider him more comfort since it will alarm him at 100 and he truly prob is 120-130. Let's make first goal of avg for danyell- to get average under 200- patient agreed to this goal and will try to get his danyell avg less than 200.      When is FU with endo? June 6th    Recommended counseling.       Janice Rucker, PharmD, [address] Pharmacist  Department: [phone]    For Pharmacy Admin Tracking Only     Gap Closed?: Yes    Time Spent (min): 15

## 2022-05-27 ENCOUNTER — CARE COORDINATION (OUTPATIENT)
Dept: OTHER | Facility: CLINIC | Age: 72
End: 2022-05-27

## 2022-05-27 NOTE — CARE COORDINATION
ACM briefly spoke to patient, states he is just about to give himself his injection and then make lunch. ACM reminded him to take 9 or 10 units as instructed by mahsa as well as his new goal to have averages under 200. He voiced understanding and is asking for a call back next week. Fátima Friedman, [address] Coordinator  Associate Care Management  [address] [place] [address], [address] [place] [address] Street  Phone: [phone]  [email or web address]

## 2022-06-03 LAB
A/G RATIO: 2.3 (ref 1.1–2.2)
ALBUMIN SERPL-MCNC: 4.2 G/DL (ref 3.4–5)
ALP BLD-CCNC: 62 U/L (ref 40–129)
ALT SERPL-CCNC: 14 U/L (ref 10–40)
ANION GAP SERPL CALCULATED.3IONS-SCNC: 14 MMOL/L (ref 3–16)
AST SERPL-CCNC: 15 U/L (ref 15–37)
BILIRUB SERPL-MCNC: 0.4 MG/DL (ref 0–1)
BUN BLDV-MCNC: 14 MG/DL (ref 7–20)
CALCIUM SERPL-MCNC: 9.1 MG/DL (ref 8.3–10.6)
CHLORIDE BLD-SCNC: 103 MMOL/L (ref 99–110)
CHOLESTEROL, TOTAL: 138 MG/DL (ref 0–199)
CO2: 24 MMOL/L (ref 21–32)
CREAT SERPL-MCNC: 0.9 MG/DL (ref 0.8–1.3)
CREATININE URINE: 106.7 MG/DL (ref 39–259)
GFR AFRICAN AMERICAN: >60
GFR NON-AFRICAN AMERICAN: >60
GLUCOSE BLD-MCNC: 112 MG/DL (ref 70–99)
HDLC SERPL-MCNC: 42 MG/DL (ref 40–60)
LDL CHOLESTEROL CALCULATED: 75 MG/DL
MICROALBUMIN UR-MCNC: 14 MG/DL
MICROALBUMIN/CREAT UR-RTO: 131.2 MG/G (ref 0–30)
POTASSIUM SERPL-SCNC: 3.9 MMOL/L (ref 3.5–5.1)
SODIUM BLD-SCNC: 141 MMOL/L (ref 136–145)
TOTAL PROTEIN: 6 G/DL (ref 6.4–8.2)
TRIGL SERPL-MCNC: 106 MG/DL (ref 0–150)
TSH SERPL DL<=0.05 MIU/L-ACNC: 3.52 UIU/ML (ref 0.27–4.2)
VLDLC SERPL CALC-MCNC: 21 MG/DL

## 2022-06-04 LAB
ESTIMATED AVERAGE GLUCOSE: 223.1 MG/DL
HBA1C MFR BLD: 9.4 %

## 2022-06-06 ENCOUNTER — OFFICE VISIT (OUTPATIENT)
Dept: ENDOCRINOLOGY | Age: 72
End: 2022-06-06
Payer: COMMERCIAL

## 2022-06-06 VITALS
SYSTOLIC BLOOD PRESSURE: 139 MMHG | WEIGHT: 203 LBS | BODY MASS INDEX: 33.82 KG/M2 | HEART RATE: 92 BPM | RESPIRATION RATE: 14 BRPM | TEMPERATURE: 98 F | HEIGHT: 65 IN | DIASTOLIC BLOOD PRESSURE: 72 MMHG

## 2022-06-06 PROCEDURE — 1123F ACP DISCUSS/DSCN MKR DOCD: CPT | Performed by: INTERNAL MEDICINE

## 2022-06-06 PROCEDURE — 3046F HEMOGLOBIN A1C LEVEL >9.0%: CPT | Performed by: INTERNAL MEDICINE

## 2022-06-06 PROCEDURE — 99214 OFFICE O/P EST MOD 30 MIN: CPT | Performed by: INTERNAL MEDICINE

## 2022-06-06 PROCEDURE — 95251 CONT GLUC MNTR ANALYSIS I&R: CPT | Performed by: INTERNAL MEDICINE

## 2022-06-06 NOTE — PROGRESS NOTES
Avi Saavedra  is here for a follow-up for management of uncontrolled DM. Patient has a PMH of Type 2 DM, hypertension, hyperlipidemia , melanoma ( managed by Dr. Jonel Swann), obesity. Eye surgery ( 10/.20 )     Diagnosed with Diabetes Mellitus type 2 since the age of 38 yrs,  Course has been variable . Microvascular complications: No known retinopathy (Last eye exam: 4/18), No nephropathy . No Peripheral neuropathy  No Macrovascular complications. Had a normal CTA coronary arteries in 11/2013. Home regimen:  Lantus 34 units in A.M   Humalog 6-7 units with meals. Synjardy  mg daily. Previous meds : Unable to tolerate victoza. Trulicity ( nausea)Xigduo 5-500 mg daily. Using freestyle danyell  Hypoglycemia : No significant episodes recently. Has Hypoglycemia awareness. Diet:. Eats breakfast at noon, lunch at 5 p.m and dinner at 9 P.M  Had Nutrition education:  No planned exercise. Patient also has hyperlipidemia and is on Lipitor 20 mg daily. Tolerating without side effects    He carries the diagnosis of  HTN for many yrs  Was on Exforge 5-160 mg daily. Now taking losartan 100 mg daily, amlodipine 5 mg daily. Tolerating without side effects    INTERIM    Still struggles with same concept about his glucose being low when it drops from 200-1 50. He starts correcting his glucose  I had a lengthy discussion with the patient again today and we both agreed that he needs to see a psychiatrist for proper treatment of his anxiety and phobias. He has not been leaving home as he is worried that he will have hypoglycemia when in fact he had 0 episodes of hypoglycemia. Patient has severe phobia of hypoglycemia which is hindering his care of diabetes. Patient understands that he is currently not having any hypoglycemia. Today I discussed target glucose values with him in detail and also discussed hypoglycemia management as well as definition of hypoglycemia.   He fully understands it and realizes that he ends up correcting glucose way above 150 just to avoid hypoglycemia. On review of his continuous glucose data there was 0 documented hypoglycemia most glucose values were above 150 ranging up to 350. No Known Allergies  Outpatient Medications Marked as Taking for the 6/6/22 encounter (Office Visit) with Cammy Dow MD   Medication Sig Dispense Refill    AGAMATRIX PRESTO TEST strip TEST BLOOD SUGAR 8 TIMES A  strip 5    Insulin Pen Needle (TRUEPLUS PEN NEEDLES) 31G X 8 MM MISC USE TO INJECT INSULIN FOUR TIMES A  each 5    amLODIPine (NORVASC) 5 MG tablet TAKE 1 TABLET BY MOUTH ONE TIME A DAY 90 tablet 1    SYNJARDY XR  MG TB24 TAKE 1 TABLET BY MOUTH ONE TIME A DAY 90 tablet 1    insulin glargine (LANTUS) 100 UNIT/ML injection vial INJECT 34 UNITS UNDER THE SKIN DAILY (Patient taking differently: INJECT 32 UNITS UNDER THE SKIN DAILY) 40 mL 1    atorvastatin (LIPITOR) 20 MG tablet TAKE 1 TABLET BY MOUTH ONE TIME A DAY 90 tablet 1    insulin lispro, 1 Unit Dial, (HUMALOG KWIKPEN) 100 UNIT/ML SOPN INJECT 10 UNITS UNDER THE SKIN THREE TIMES A DAY BEFORE MEALS (Patient taking differently: INJECT 6-8 UNITS UNDER THE SKIN THREE TIMES A DAY BEFORE MEALS) 30 mL 1    losartan (COZAAR) 100 MG tablet TAKE 1 TABLET BY MOUTH ONE TIME A DAY 90 tablet 1    omeprazole (PRILOSEC) 40 MG delayed release capsule TAKE 1 CAPSULE BY MOUTH ONE TIME A DAY 90 capsule 1    Continuous Blood Gluc  (FREESTYLE LA 2 READER) DORA To check glucose levels 1 each 0    Insulin Syringe-Needle U-100 (TRUEPLUS INSULIN SYRINGE) 31G X 5/16\" 1 ML MISC USE DAILY 100 each 3    albuterol sulfate  (90 Base) MCG/ACT inhaler 2 puffs every 6 hours as needed for shortness of breath or wheezing 1 Inhaler 3    Cholecalciferol (VITAMIN D3) 1000 UNITS CAPS Take 1,000 each by mouth daily.  aspirin EC 81 MG EC tablet Take 1 tablet by mouth daily. 30 tablet 11     Body mass index is 33.78 kg/m².      Wt Readings from Last 3 Encounters:   22 203 lb (92.1 kg)   22 202 lb 6.4 oz (91.8 kg)   21 203 lb (92.1 kg)     BP Readings from Last 3 Encounters:   22 139/72   22 (!) 152/78   21 (!) 144/74        Past Medical History:   Diagnosis Date    Asthma     childhood     CAD (coronary artery disease)     Coronary artery calcification seen on CAT scan     GERD (gastroesophageal reflux disease)     Hyperlipidemia     Hypertension     [facility]     Melanoma in situ of skin of buttock ([facility] 2016    RT abdomen    Moderate single current episode of major depressive disorder ([facility] 2017    Obesity     Osteoarthritis     Proteinuria     Salcedo Hunt syndrome     Rosacea     Stricture, esophagus     Type II or unspecified type diabetes mellitus without mention of complication, not stated as uncontrolled     Vitamin D deficiency      Past Surgical History:   Procedure Laterality Date    CATARACT REMOVAL Right 10/2020    CHOLECYSTECTOMY      COLONOSCOPY      TOE AMPUTATION Left     lawnmower accident- 2 toes    UPPER GASTROINTESTINAL ENDOSCOPY      VASECTOMY       Family History   Problem Relation Age of Onset    Cancer Father         kidney    Diabetes Father     Kidney Disease Father     Diabetes Sister     Cancer Paternal Grandfather         melanoma     Social History     Tobacco Use   Smoking Status Former Smoker    Packs/day: 2.00    Years: 15.00    Pack years: 30.00    Types: Cigarettes    Quit date:     Years since quittin.4   Smokeless Tobacco Never Used   Tobacco Comment    quit       Social History     Substance and Sexual Activity   Alcohol Use Yes    Comment: rarely     ROS   I have reviewed the review of system questionnaire filled by the patient .   Patient was advised to contact PCP for non endocrine signs and symptoms       EXAM   Constitutional: no acute distress, well appearing, well nourished  Psychiatric: oriented to person, place and time, judgement, insight and normal, recent and remote memory and intact and mood, affect are normal  Skin: skin and subcutaneous tissue is normal without mass,   Head and Face: examination of head and face revealed no abnormalities  Eyes: no lid or conjunctival swelling, no erythema or discharge, pupils are normal,   Ears/Nose: external inspection of ears and nose revealed no abnormalities, hearing is grossly normal  Oropharynx/Mouth/Face: lips, tongue and gums are normal with no lesions, the voice quality was normal  Neck: neck is supple and symmetric, with midline trachea and no masses, thyroid is normal    Pulmonary: no increased work of breathing or signs of respiratory distress, lungs are clear to auscultation  Cardiovascular: normal heart rate and rhythm, normal S1 and S2,   Musculoskeletal: normal gait and station,   Neurological: normal coordination, normal general cortical function    Lab Results   Component Value Date    LABA1C 9.4 06/03/2022     Lab Results   Component Value Date    .1 06/03/2022       Assessment/Plan      --- Uncontrolled Type 2 DM  aic was 9.4    Karen Pae is a 66 y.o. male has Type 2 DM with obesity and insulin resistance. Severe Anxiety and phobia of having hypoglycemia  which is hindering diabetes care. As worried about glucose getting low which is not the case in the last few months   He panicks at glucose of 150 and start consuming quick acting carbs. Uncontrolled. Aic 7.8 >>8.3>>8.8  Advised to increase meal bolus by 1 units   He has not been leaving house due to fear of hypoglycemia ! He had no lows in the last 2 weeks   He tends to miss doses of insulin specifically lunchtime doses to avoid low glucose. I had a lengthy discussion with the patient regarding glucose targets and advised him not to correct a glucose value above 90.   Client also advised him to take his meal boluses 10 minutes prior to eating even when glucose values are between 100-1 30.    -on  lantus 32 units QAM   -Use Humalog 7-8 units with meals + SSI but mostly takes 6 units with breakfast and does not take any insulin with lunch as he worries about hypoglycemia and takes it after he eats lunch. He was advised to increase hs meal bolus by 1 unit and slowly uptitrate . He has fear of hypoglycemia ,PCP started on esctilopram but he is having sweating and doesn't want to continue it      -Continue Synjardy to  mg daily. Advised to reduce carb intake. Health maintenance     Advised follow-up with the ophthalmologist once a year. last one was oct 2020    Last urine microalbumin/cr ratio high>>98 in jna 2022  in march 2021 pt was told . On losartan elevated    Discussed foot care. Pt on ASA. Former smoker. 2. Hypertension. BP  at goal at home  elevated today as he is upset about his glucose values   Advised to check at home     3. Hyperlipidemia. On statins. Labs in 07/18--> 01/19---> 03/19--> 02/20      4. Melanoma S/p surgery. Diabetes Continuous Glucose Monitoring Report         Reason for Study:     - improve diabetic control without risk of hypoglycemia       Current Medication regimen:   lantus 32 units   7 units with breakfast, typically does not take other boluses and skips boluses if he is leaving home as he is worried about having hypoglycemia. CGMS Report     CGMS data collection was performed on jun 6, 2022   Patient provided information on his  diet, activities and insulin dosing  during this period. Data was available for 14  days     Sensor Data Report:     - 12 AM to 6 AM: Overnight blood glucose pattern shows stable glycemia   - 6   AM to 10 AM:  Post breakfast hyperglycemia  is noted  --10AM to 5 PM : no  hypoglycemia observed during this time.   - 5   PM to 8 PM: Post meal  Hyperglycemia is noted        Average reading 201 mg/dL   Co eff variation 26.3   % of time <70 mg/dL 0 %   % of time >180  mg/dL 67 %   % of time within range  33 %  Number of hypoglycemia episodes noted: 0     Impression:   CGMS shows stable hyperglycemia overnite      Recommendation:      Patient was advised to make the following changes in his diabetic regimen  Advised to start taking meal boluses 10 minutes before eating   --- lantus 32 units   Takes 8 units with each meal

## 2022-06-09 ENCOUNTER — CARE COORDINATION (OUTPATIENT)
Dept: OTHER | Facility: CLINIC | Age: 72
End: 2022-06-09

## 2022-06-09 NOTE — CARE COORDINATION
Ambulatory Care Coordination Note  6/9/2022  CM Risk Score: 3  [place] 10 Year Mortality Risk Score: 79%     ACC: Beverli Boas, LPN     Spoke to patient, states his appt with Dr. Tyree Garcia went very well. He states he feel ready to start making changes to help his hypoglycemia anxiety and lower his A1C. Since the appt, he has stopped eating crackers and cheese in the middle of night- bringing his fasting sugars to around 100 now. Denies any hypoglycemia s/s. His 3 day average (since appt) is 183- compared to 230+. He states he continues to take 8 units TID Humalog. His goal is tomorrow to start taking 9 units at least one of the three doses. ACM informed him he can also take 1-2 units at 3pm depending on his sugar level and how comfortable he feels (adv him to make sure doses are at least 2 hours apart). He states today after his wife left for work about 11:00AM, he took a shower for the first time in 2 weeks, felt very great, proud of himself. Patient is very goal orientated and would benefit from ACM weekly check in's. ACM in agreement, as he does not wish to proceed with counseling/psychiatry at this time. Short term GOALS:  - 9 units TID -or- 1-2 units at [address] at least 2-3x/week  - Find a new hobby or project       Goals Addressed                    This Visit's Progress      Behavioral Health   Worsening      I will work towards the following [facility] goals: I will continue to follow up with my psychologist /counselor and/or psychiatrist. and I will take my medications daily as prescribed. Barriers: lack of support and lack of education  Plan for overcoming my barriers: I will continue to follow my counselor, Rona Araya, and be 100% compliant with my Lexapro and take my Hydroxyzine as needed. I will let my PCP, ACM, or my counselor know if I have any concerns.   Confidence: 8/10  Anticipated Goal Completion Date: 12/18/21        Patient Stated (pt-stated)         I will gain independence over my diabetes and lessen my fear of hypoglycemia. Barriers: impairment:  cognitive and anxiety, fear of failure, and overwhelmed by complexity of regimen  Plan for overcoming my barriers: I will work with my ACM weekly for continued education, support, and encouragement. I will consult with my Endocrinologist if I have questions or concerns. Confidence: 8/10  Anticipated Goal Completion Date: 10/31/22            Prior to Admission medications    Medication Sig Start Date End Date Taking?  Authorizing Provider   AGAMATRIX PRESTO TEST strip TEST BLOOD SUGAR 8 TIMES A DAY 5/17/22   Osmani Peñaloza MD   Insulin Pen Needle (TRUEPLUS PEN NEEDLES) 31G X 8 MM MISC USE TO INJECT INSULIN FOUR TIMES A DAY 5/12/22   Osmani Peñaloza MD   Continuous Blood Gluc Sensor (FREESTYLE LA 14 DAY SENSOR) MISC USE AS DIRECTED CHANGE EVERY 14 DAYS 3/24/22   Osmani Peñaloza MD   amLODIPine (NORVASC) 5 MG tablet TAKE 1 TABLET BY MOUTH ONE TIME A DAY 2/24/22   Osmani Peñaloza MD   SYNJARDY XR  MG TB24 TAKE 1 TABLET BY MOUTH ONE TIME A DAY 2/24/22   Osmani Peñaloza MD   insulin glargine (LANTUS) 100 UNIT/ML injection vial INJECT 34 UNITS UNDER THE SKIN DAILY  Patient taking differently: INJECT 32 UNITS UNDER THE SKIN DAILY 2/24/22   Osmani Peñaloza MD   atorvastatin (LIPITOR) 20 MG tablet TAKE 1 TABLET BY MOUTH ONE TIME A DAY 2/24/22   Osmani Peñaloza MD   insulin lispro, 1 Unit Dial, (HUMALOG KWIKPEN) 100 UNIT/ML SOPN INJECT 10 UNITS UNDER THE SKIN THREE TIMES A DAY BEFORE MEALS  Patient taking differently: INJECT 6-8 UNITS UNDER THE SKIN THREE TIMES A DAY BEFORE MEALS 2/24/22   Osmani Peñaloza MD   losartan (COZAAR) 100 MG tablet TAKE 1 TABLET BY MOUTH ONE TIME A DAY 2/24/22   Osmani Peñaloza MD   omeprazole (PRILOSEC) 40 MG delayed release capsule TAKE 1 CAPSULE BY MOUTH ONE TIME A DAY 2/24/22   Osmani Peñaloza MD   Continuous Blood Gluc  (FREESTYLE LA 2 READER) DORA To check glucose levels 1/11/22   Osmani Peñaloza MD   Insulin Syringe-Needle U-100 (TRUEPLUS INSULIN SYRINGE) 31G X 5/16\" 1 ML MISC USE DAILY 7/14/21   Anam Soto MD   albuterol sulfate  (90 Base) MCG/ACT inhaler 2 puffs every 6 hours as needed for shortness of breath or wheezing 2/16/21   Wolfgang Garcia MD   Cholecalciferol (VITAMIN D3) 1000 UNITS CAPS Take 1,000 each by mouth daily. Historical Provider, MD   aspirin EC 81 MG EC tablet Take 1 tablet by mouth daily. 4/6/12   Donna Saxena, DO       Future Appointments   Date Time Provider Nikunj Casanova   10/31/2022  2:20 PM Anam Soto MD [facility]       Aaliyah Steven. Eliu Castano, [address] Coordinator  Associate Care Management  [address] [place] [address], [address] [place] [address] Street  Phone: [phone]  [email or web address]

## 2022-06-16 ENCOUNTER — TELEPHONE (OUTPATIENT)
Dept: ENDOCRINOLOGY | Age: 72
End: 2022-06-16

## 2022-06-16 ENCOUNTER — CARE COORDINATION (OUTPATIENT)
Dept: OTHER | Facility: CLINIC | Age: 72
End: 2022-06-16

## 2022-06-16 NOTE — CARE COORDINATION
Ambulatory Care Coordination Note  2022  CM Risk Score: 3  Charlson 10 Year Mortality Risk Score: 79%     ACC: Anais Mcclelland LPN    Ambulatory Care Manager (ACM) contacted the patient by telephone to follow up on progress, discuss new issues or concerns, and reinforce/ provide patient education. Verified name and  with patient as identifiers. Spoke to patient. He reports he has been doing more things around the house and showering just about every other day, which is a big improvement for him. However, he has not yet started the increase from 8 units TID to 9 units for at least one of the doses. He has not yet started the 1-2 units at [facility] either. His current BG is 274. ACM advised him to take the extra unit during our conversation, patient declined right now. Encouragement provided. Reiterated that 274 is very high and even 1 unit will not bring it down much. Follow up in 1 week. He agrees to try to take the 3PM unit or add one dose of 9 units. Care Coordination Interventions    Program Enrollment: Rising Risk  Referral from Primary Care Provider: No  Suggested Interventions and Community Resources         Goals Addressed                    This Visit's Progress      Behavioral Health   Worsening      I will work towards the following [address] goals: I will continue to follow up with my psychologist /counselor and/or psychiatrist. and I will take my medications daily as prescribed. Barriers: lack of support and lack of education  Plan for overcoming my barriers: I will continue to follow my counselor, [facility], and be 100% compliant with my Lexapro and take my Hydroxyzine as needed. I will let my PCP, ACM, or my counselor know if I have any concerns. Confidence: 8/10  Anticipated Goal Completion Date: 21        Patient Stated (pt-stated)   No change      I will gain independence over my diabetes and lessen my fear of hypoglycemia.     Barriers: impairment:  cognitive and anxiety, fear of failure, and overwhelmed by complexity of regimen  Plan for overcoming my barriers: I will work with my ACM weekly for continued education, support, and encouragement. I will consult with my Endocrinologist if I have questions or concerns. Confidence: 8/10  Anticipated Goal Completion Date: 10/31/22            Prior to Admission medications    Medication Sig Start Date End Date Taking?  Authorizing Provider   AGAMATRIX PRESTO TEST strip TEST BLOOD SUGAR 8 TIMES A DAY 5/17/22   Anam Soto MD   Insulin Pen Needle (TRUEPLUS PEN NEEDLES) 31G X 8 MM MISC USE TO INJECT INSULIN FOUR TIMES A DAY 5/12/22   Anam Soto MD   Continuous Blood Gluc Sensor (FREESTYLE LA 14 DAY SENSOR) MISC USE AS DIRECTED CHANGE EVERY 14 DAYS 3/24/22   Anam Soto MD   amLODIPine (NORVASC) 5 MG tablet TAKE 1 TABLET BY MOUTH ONE TIME A DAY 2/24/22   Anam Soto MD   SYNJARDY XR  MG TB24 TAKE 1 TABLET BY MOUTH ONE TIME A DAY 2/24/22   Anam Soto MD   insulin glargine (LANTUS) 100 UNIT/ML injection vial INJECT 34 UNITS UNDER THE SKIN DAILY  Patient taking differently: INJECT 32 UNITS UNDER THE SKIN DAILY 2/24/22   Anam Soto MD   atorvastatin (LIPITOR) 20 MG tablet TAKE 1 TABLET BY MOUTH ONE TIME A DAY 2/24/22   Anam Soto MD   insulin lispro, 1 Unit Dial, (HUMALOG KWIKPEN) 100 UNIT/ML SOPN INJECT 10 UNITS UNDER THE SKIN THREE TIMES A DAY BEFORE MEALS  Patient taking differently: INJECT 6-8 UNITS UNDER THE SKIN THREE TIMES A DAY BEFORE MEALS 2/24/22   Anam Soto MD   losartan (COZAAR) 100 MG tablet TAKE 1 TABLET BY MOUTH ONE TIME A DAY 2/24/22   Anam Soto MD   omeprazole (PRILOSEC) 40 MG delayed release capsule TAKE 1 CAPSULE BY MOUTH ONE TIME A DAY 2/24/22   Anam Soto MD   Continuous Blood Gluc  (FREESTYLE LA 2 READER) DORA To check glucose levels 1/11/22   Anam Soto MD   Insulin Syringe-Needle U-100 (TRUEPLUS INSULIN SYRINGE) 31G X 5/16\" 1 ML [address] [facility] USE DAILY 7/14/21   Anam Soto MD albuterol sulfate  (90 Base) MCG/ACT inhaler 2 puffs every 6 hours as needed for shortness of breath or wheezing 2/16/21   Sofi Cartagena MD   Cholecalciferol (VITAMIN D3) 1000 UNITS CAPS Take 1,000 each by mouth daily. Historical Provider, MD   aspirin EC 81 MG EC tablet Take 1 tablet by mouth daily. 4/6/12   [facility], DO       Future Appointments   Date Time Provider Nikunj Casanova   10/31/2022  2:20 PM Jean-Pierre Miranda MD [facility]       Ralph Soto. Cade Baker, [address] [facility] Coordinator  Associate Care Management  [address] [place] [address], [address] [place] [address]  Phone: [phone]  [email or web address]

## 2022-06-16 NOTE — TELEPHONE ENCOUNTER
Pt is wanting a letter sent to [employer] for not being able to do jury duty because he is a diabetic. Fax it to [facility] @ [phone]    And call pt to let him know when it's been faxed.  He said to lvm if he doesn't answer    CB# for pt [phone]

## 2022-06-23 ENCOUNTER — CARE COORDINATION (OUTPATIENT)
Dept: OTHER | Facility: CLINIC | Age: 72
End: 2022-06-23

## 2022-06-23 NOTE — CARE COORDINATION
Ambulatory Care Coordination Note  2022  CM Risk Score: 3  Charlson 10 Year Mortality Risk Score: 79%     ACC: Rafal Welch LPN    Ambulatory Care Manager (ACM) contacted the patient by telephone to follow up on progress, discuss new issues or concerns, and reinforce/ provide patient education. Verified name and  with patient as identifiers. Spoke to patient. He states he has not yet increasing his SA to 9 units, still taking 8 TID. HAS not been adding in the 3PM unit neither. He states the other day he left his home to go to the [employer] and put gas in the car for his wife. Prior to leaving, his BG was 250. When he returned home it was low 200's, no exact # was given. He ate a snack at this time. Reiterated that 200 is not low. Patient continues to have excess worry and fear with his sugars being too low. He does not feel ready to add the units of insulin- he states he has too much going on at home right now, has to have a tree removal company come to his home to remove a tree that fell in their yard. He did state guilt that he is unable to help his wife perform chores around the home, like cutting the grass. ACM introduced a new goal, to focus on performing small chores; picking up groceries, running an errand, going to the bank, mowing the lawn in increments, ect. He agrees to try this to increase his quality of life and help out his wife. Patient would highly benefit from CBT if amenable. ACM to discuss this more with him at the next call.       Care Coordination Interventions    Program Enrollment: Rising Risk  Referral from Primary Care Provider: No  Suggested Interventions and Community Resources         Goals Addressed                 This Visit's Progress     Behavioral Health   Worsening     I will work towards the following [facility] goals: I will continue to follow up with my psychologist /counselor and/or psychiatrist. and I will take my medications daily as prescribed. Barriers: lack of support and lack of education  Plan for overcoming my barriers: I will continue to follow my counselor, Papi Henriquez, and be 100% compliant with my Lexapro and take my Hydroxyzine as needed. I will let my PCP, ACM, or my counselor know if I have any concerns. Confidence: 8/10  Anticipated Goal Completion Date: 12/18/21            Prior to Admission medications    Medication Sig Start Date End Date Taking?  Authorizing Provider   AGAMATRIX PRESTO TEST strip TEST BLOOD SUGAR 8 TIMES A DAY 5/17/22   Calos Harris MD   Insulin Pen Needle (TRUEPLUS PEN NEEDLES) 31G X 8 MM MISC USE TO INJECT INSULIN FOUR TIMES A DAY 5/12/22   Calos Harris MD   Continuous Blood Gluc Sensor (FREESTYLE LA 14 DAY SENSOR) MISC USE AS DIRECTED CHANGE EVERY 14 DAYS 3/24/22   Calos Harris MD   amLODIPine (NORVASC) 5 MG tablet TAKE 1 TABLET BY MOUTH ONE TIME A DAY 2/24/22   Calos Harris MD   SYNJARDY XR  MG TB24 TAKE 1 TABLET BY MOUTH ONE TIME A DAY 2/24/22   Calos Harris MD   insulin glargine (LANTUS) 100 UNIT/ML injection vial INJECT 34 UNITS UNDER THE SKIN DAILY  Patient taking differently: INJECT 32 UNITS UNDER THE SKIN DAILY 2/24/22   Calos Harris MD   atorvastatin (LIPITOR) 20 MG tablet TAKE 1 TABLET BY MOUTH ONE TIME A DAY 2/24/22   Calos Harris MD   insulin lispro, 1 Unit Dial, (HUMALOG KWIKPEN) 100 UNIT/ML SOPN INJECT 10 UNITS UNDER THE SKIN THREE TIMES A DAY BEFORE MEALS  Patient taking differently: INJECT 6-8 UNITS UNDER THE SKIN THREE TIMES A DAY BEFORE MEALS 2/24/22   Calos Harris MD   losartan (COZAAR) 100 MG tablet TAKE 1 TABLET BY MOUTH ONE TIME A DAY 2/24/22   Calos Harris MD   omeprazole (PRILOSEC) 40 MG delayed release capsule TAKE 1 CAPSULE BY MOUTH ONE TIME A DAY 2/24/22   Calos Harris MD   Continuous Blood Gluc  (FREESTYLE LA 2 READER) DORA To check glucose levels 1/11/22   Calos Harris MD   Insulin Syringe-Needle U-100 (TRUEPLUS INSULIN SYRINGE) 31G X 5/16\" 1 ML MISC USE DAILY 7/14/21   Mackenzie Arita MD   albuterol sulfate  (90 Base) MCG/ACT inhaler 2 puffs every 6 hours as needed for shortness of breath or wheezing 2/16/21   Kristyn Wells MD   Cholecalciferol (VITAMIN D3) 1000 UNITS CAPS Take 1,000 each by mouth daily. Historical Provider, MD   aspirin EC 81 MG EC tablet Take 1 tablet by mouth daily. 4/6/12   Nella Up DO       Future Appointments   Date Time Provider Nikunj Gonzalezi   10/31/2022  2:20 PM Mackenzie Arita MD [facility]       El Merchant. Dee Morales, [address] Coordinator  Associate Care Management  [address] [place] [address], [address] [place] [address] Street  Phone: [phone]  [email or web address]

## 2022-07-07 ENCOUNTER — CARE COORDINATION (OUTPATIENT)
Dept: OTHER | Facility: CLINIC | Age: 72
End: 2022-07-07

## 2022-07-18 ENCOUNTER — TELEPHONE (OUTPATIENT)
Dept: PHARMACY | Facility: CLINIC | Age: 72
End: 2022-07-18

## 2022-07-18 NOTE — TELEPHONE ENCOUNTER
As of 7/12/22 patient has used $530 of the maximum of $600 a year in waived co pays for specific medications and pharmacy-related supplies through the [address] for the DM Program.    Patient currently enrolled in the DM Program and is nearing the maximum of $600 a year in waived co pays for specific medications and pharmacy-related supplies through the [address]. Courtesy call placed to patient to advise them of the above information. Patient unavailable at the time of call. Message left on TAD: Maximum waived co pays for the DM Program has almost been met. Once maximum has been reached, they will begin to be charged their co-payment once again. I left our number: [phone], option #3 if patient has any questions/concerns.       Lilia Benitez[address]   Phone: [phone], option #3     For Pharmacy Admin Tracking Only    CPA in place:  No  Time Spent (min): 5

## 2022-08-01 ENCOUNTER — CARE COORDINATION (OUTPATIENT)
Dept: OTHER | Facility: CLINIC | Age: 72
End: 2022-08-01

## 2022-08-01 NOTE — CARE COORDINATION
Ambulatory Care Coordination Note  2022    ACC: Wolfgang Tan LPN    Ambulatory Care Manager [facility]) contacted the patient by telephone to follow up on progress, discuss new issues or concerns, and reinforce/ provide patient education. Verified name and  with patient as identifiers. Spoke to patient, he states he is still dosing 8 units TID. He states he had some issues with his Freestyle [place]- was getting  point differences between the [place] and the glucometer. He put a new sensor on a back of the other arm a bit lower, he states now numbers are only about 15-30 point off. 174-190's after breakfast. Has not been getting out of the house much. Wife's car is in the shop, she is driving his. Has been decreasing the carbs in his diet, monitoring portions with CHO snacks, like chips, eating smaller portions now. Reinforced/ Provided Education:  Discussed red flags and appropriate site of care based on symptoms and resources available to patient including: PCP  Specialist  Benefits related nurse triage line  When to call 911. Importance and benefits of: Follow up with PCP and specialist, medication adherence, self monitoring and reporting of symptoms. Plan:  Continue biweekly outreaches to provide telephonic support, education and resources as needed. Discuss / follow up on: Goal progress, encouragement to lower blood sugars by taking 9 units TID or 8 units TID plus 1 unit at 3pm.     Pt verbalized understanding and is agreeable to follow up contact.        Care Coordination Interventions    Program Enrollment: Rising Risk  Referral from Primary Care Provider: No  Suggested Interventions and Community Resources          Goals Addressed                      This Visit's Progress      Behavioral Health   Worsening      I will work towards the following Behavioral Health goals: I will continue to follow up with my psychologist /counselor and/or psychiatrist. and I will take my medications daily as prescribed. Barriers: lack of support and lack of education  Plan for overcoming my barriers: I will continue to follow my counselor, Will Mauk, and be 100% compliant with my Lexapro and take my Hydroxyzine as needed. I will let my PCP, ACM, or my counselor know if I have any concerns. Confidence: 8/10  Anticipated Goal Completion Date: 12/18/21        Patient Stated (pt-stated)   Improving      I will gain independence over my diabetes and lessen my fear of hypoglycemia. Barriers: impairment:  cognitive and anxiety, fear of failure, and overwhelmed by complexity of regimen  Plan for overcoming my barriers: I will work with my ACM weekly for continued education, support, and encouragement. I will consult with my Endocrinologist if I have questions or concerns. Confidence: 8/10  Anticipated Goal Completion Date: 10/31/22              Prior to Admission medications    Medication Sig Start Date End Date Taking?  Authorizing Provider   AGAMATRIX PRESTO TEST strip TEST BLOOD SUGAR 8 TIMES A DAY 5/17/22   William Carvajal MD   Insulin Pen Needle (TRUEPLUS PEN NEEDLES) 31G X 8 MM MISC USE TO INJECT INSULIN FOUR TIMES A DAY 5/12/22   William Carvajal MD   Continuous Blood Gluc Sensor (FREESTYLE LA 14 DAY SENSOR) MISC USE AS DIRECTED CHANGE EVERY 14 DAYS 3/24/22   William Carvajal MD   amLODIPine (NORVASC) 5 MG tablet TAKE 1 TABLET BY MOUTH ONE TIME A DAY 2/24/22   William Carvajal MD   SYNJARDY XR  MG TB24 TAKE 1 TABLET BY MOUTH ONE TIME A DAY 2/24/22   William Carvajal MD   insulin glargine (LANTUS) 100 UNIT/ML injection vial INJECT 34 UNITS UNDER THE SKIN DAILY  Patient taking differently: INJECT 32 UNITS UNDER THE SKIN DAILY 2/24/22   William Carvajal MD   atorvastatin (LIPITOR) 20 MG tablet TAKE 1 TABLET BY MOUTH ONE TIME A DAY 2/24/22   William Carvajal MD   insulin lispro, 1 Unit Dial, (HUMALOG KWIKPEN) 100 UNIT/ML SOPN INJECT 10 UNITS UNDER THE SKIN THREE TIMES A DAY BEFORE MEALS  Patient taking differently: INJECT 6-8 UNITS UNDER THE SKIN THREE TIMES A DAY BEFORE MEALS 2/24/22   Loren Santiago MD   losartan (COZAAR) 100 MG tablet TAKE 1 TABLET BY MOUTH ONE TIME A DAY 2/24/22   Loren Santiago MD   omeprazole (PRILOSEC) 40 MG delayed release capsule TAKE 1 CAPSULE BY MOUTH ONE TIME A DAY 2/24/22   Loren Santiago MD   Continuous Blood Gluc  (FREESTYLE LA 2 READER) DORA To check glucose levels 1/11/22   Loren Santiago MD   Insulin Syringe-Needle U-100 (TRUEPLUS INSULIN SYRINGE) 31G X 5/16\" 1 ML MISC USE DAILY 7/14/21   Loren Santiago MD   albuterol sulfate  (90 Base) MCG/ACT inhaler 2 puffs every 6 hours as needed for shortness of breath or wheezing 2/16/21   Inder Cheema MD   Cholecalciferol (VITAMIN D3) 1000 UNITS CAPS Take 1,000 each by mouth daily. Historical Provider, MD   aspirin EC 81 MG EC tablet Take 1 tablet by mouth daily. 4/6/12   Jany Boston,        Future Appointments   Date Time Provider Nikunj Casanova   10/31/2022  2:20 PM Loren Santiago MD [facility]       Becky Deluca. Nani Hancock, [address] Coordinator  Associate Care Management  [address] [place] [address] Street  Phone: [phone]  [email or web address]

## 2022-08-03 DIAGNOSIS — I10 BENIGN ESSENTIAL HTN: ICD-10-CM

## 2022-08-03 DIAGNOSIS — E11.9 DIABETES MELLITUS WITHOUT COMPLICATION (HCC): ICD-10-CM

## 2022-08-03 DIAGNOSIS — E78.2 MIXED HYPERLIPIDEMIA: ICD-10-CM

## 2022-08-03 RX ORDER — EMPAGLIFLOZIN, METFORMIN HYDROCHLORIDE 25; 1000 MG/1; MG/1
TABLET, EXTENDED RELEASE ORAL
Qty: 90 TABLET | Refills: 0 | Status: SHIPPED | OUTPATIENT
Start: 2022-08-03 | End: 2022-10-31

## 2022-08-03 RX ORDER — LOSARTAN POTASSIUM 100 MG/1
TABLET ORAL
Qty: 90 TABLET | Refills: 0 | Status: SHIPPED | OUTPATIENT
Start: 2022-08-03

## 2022-08-03 RX ORDER — BLOOD SUGAR DIAGNOSTIC
STRIP MISCELLANEOUS
Qty: 100 EACH | Refills: 3 | Status: SHIPPED | OUTPATIENT
Start: 2022-08-03

## 2022-08-03 RX ORDER — OMEPRAZOLE 40 MG/1
CAPSULE, DELAYED RELEASE ORAL
Qty: 90 CAPSULE | Refills: 0 | Status: SHIPPED | OUTPATIENT
Start: 2022-08-03

## 2022-08-03 RX ORDER — AMLODIPINE BESYLATE 5 MG/1
TABLET ORAL
Qty: 90 TABLET | Refills: 0 | Status: SHIPPED | OUTPATIENT
Start: 2022-08-03

## 2022-08-03 RX ORDER — ATORVASTATIN CALCIUM 20 MG/1
TABLET, FILM COATED ORAL
Qty: 90 TABLET | Refills: 0 | Status: SHIPPED | OUTPATIENT
Start: 2022-08-03

## 2022-08-03 RX ORDER — INSULIN GLARGINE 100 [IU]/ML
INJECTION, SOLUTION SUBCUTANEOUS
Qty: 40 ML | Refills: 0 | Status: SHIPPED | OUTPATIENT
Start: 2022-08-03

## 2022-08-03 RX ORDER — INSULIN LISPRO 100 [IU]/ML
INJECTION, SOLUTION INTRAVENOUS; SUBCUTANEOUS
Qty: 30 ML | Refills: 0 | Status: SHIPPED | OUTPATIENT
Start: 2022-08-03

## 2022-08-30 ENCOUNTER — CARE COORDINATION (OUTPATIENT)
Dept: OTHER | Facility: CLINIC | Age: 72
End: 2022-08-30

## 2022-08-30 NOTE — CARE COORDINATION
losartan (COZAAR) 100 MG tablet TAKE 1 TABLET BY MOUTH ONE TIME A DAY 8/3/22   Arti Hudson MD   amLODIPine (NORVASC) 5 MG tablet TAKE 1 TABLET BY MOUTH ONE TIME A DAY 8/3/22   Arti Hudson MD   Insulin Syringe-Needle U-100 (TRUEPLUS INSULIN SYRINGE) 31G X 5/16\" 1 ML MISC USE DAILY 8/3/22   Arti Hudson MD   omeprazole (PRILOSEC) 40 MG delayed release capsule TAKE 1 CAPSULE BY MOUTH ONE TIME A DAY 8/3/22   Arti Hudson MD   insulin lispro, 1 Unit Dial, (HUMALOG KWIKPEN) 100 UNIT/ML SOPN INJECT 10 UNITS UNDER THE SKIN THREE TIMES A DAY BEFORE MEALS 8/3/22   Arti Hudson MD   SYNJARDY XR  MG TB24 TAKE 1 TABLET BY MOUTH ONE TIME A DAY 8/3/22   Arti Hudson MD   atorvastatin (LIPITOR) 20 MG tablet TAKE 1 TABLET BY MOUTH ONE TIME A DAY 8/3/22   Arti Hudson MD   insulin glargine (LANTUS) 100 UNIT/ML injection vial INJECT 34 UNITS UNDER THE SKIN ONE TIME DAILY 8/3/22   Arti Hudson MD   AGAMATRIX PRESTO TEST strip TEST BLOOD SUGAR 8 TIMES A DAY 5/17/22   Arti Hudson MD   Insulin Pen Needle (TRUEPLUS PEN NEEDLES) 31G X 8 MM MISC USE TO INJECT INSULIN FOUR TIMES A DAY 5/12/22   Arti Hudson MD   Continuous Blood Gluc Sensor (FREESTYLE LA 14 DAY SENSOR) MISC USE AS DIRECTED CHANGE EVERY 14 DAYS 3/24/22   Arti Hudson MD   Continuous Blood Gluc  (FREESTYLE LA 2 READER) DORA To check glucose levels 1/11/22   Arti Hudson MD   albuterol sulfate  (90 Base) MCG/ACT inhaler 2 puffs every 6 hours as needed for shortness of breath or wheezing 2/16/21   Darryl Brown MD   Cholecalciferol (VITAMIN D3) 1000 UNITS CAPS Take 1,000 each by mouth daily. Historical Provider, MD   aspirin EC 81 MG EC tablet Take 1 tablet by mouth daily. 4/6/12   Alejandro Bah, DO       Future Appointments   Date Time Provider Nikunj Casanova   10/31/2022  2:20 PM MD [facility]       Giuliana Gomez.  Yeimi Palmer LPN- Care Coordinator  Associate Care Management  [address], [facility] [address]  Phone: [phone]  [email or web address]

## 2022-09-17 ENCOUNTER — APPOINTMENT (OUTPATIENT)
Dept: GENERAL RADIOLOGY | Age: 72
End: 2022-09-17
Payer: COMMERCIAL

## 2022-09-17 ENCOUNTER — APPOINTMENT (OUTPATIENT)
Dept: CT IMAGING | Age: 72
End: 2022-09-17
Payer: COMMERCIAL

## 2022-09-17 ENCOUNTER — HOSPITAL ENCOUNTER (EMERGENCY)
Age: 72
Discharge: HOME | End: 2022-09-17
Payer: COMMERCIAL

## 2022-09-17 ENCOUNTER — NURSE TRIAGE (OUTPATIENT)
Dept: OTHER | Facility: CLINIC | Age: 72
End: 2022-09-17

## 2022-09-17 VITALS
SYSTOLIC BLOOD PRESSURE: 172 MMHG | BODY MASS INDEX: 34.16 KG/M2 | WEIGHT: 205 LBS | OXYGEN SATURATION: 98 % | TEMPERATURE: 98.4 F | RESPIRATION RATE: 14 BRPM | DIASTOLIC BLOOD PRESSURE: 64 MMHG | HEIGHT: 65 IN | HEART RATE: 86 BPM

## 2022-09-17 DIAGNOSIS — K59.00 CONSTIPATION, UNSPECIFIED CONSTIPATION TYPE: Primary | ICD-10-CM

## 2022-09-17 LAB
ALBUMIN SERPL-MCNC: 4.4 G/DL (ref 3.4–5)
ALP BLD-CCNC: 80 U/L (ref 40–129)
ALT SERPL-CCNC: 17 U/L (ref 10–40)
ANION GAP SERPL CALCULATED.3IONS-SCNC: 10 MMOL/L (ref 3–16)
AST SERPL-CCNC: 18 U/L (ref 15–37)
BACTERIA: NORMAL /HPF
BASOPHILS ABSOLUTE: 0.1 K/UL (ref 0–0.2)
BASOPHILS RELATIVE PERCENT: 0.8 %
BILIRUB SERPL-MCNC: 0.3 MG/DL (ref 0–1)
BILIRUBIN DIRECT: <0.2 MG/DL (ref 0–0.3)
BILIRUBIN URINE: NEGATIVE
BILIRUBIN, INDIRECT: NORMAL MG/DL (ref 0–1)
BLOOD, URINE: NEGATIVE
BUN BLDV-MCNC: 15 MG/DL (ref 7–20)
CALCIUM SERPL-MCNC: 9.8 MG/DL (ref 8.3–10.6)
CHLORIDE BLD-SCNC: 101 MMOL/L (ref 99–110)
CLARITY: CLEAR
CO2: 26 MMOL/L (ref 21–32)
COLOR: YELLOW
CREAT SERPL-MCNC: 1 MG/DL (ref 0.8–1.3)
EOSINOPHILS ABSOLUTE: 0.1 K/UL (ref 0–0.6)
EOSINOPHILS RELATIVE PERCENT: 0.7 %
EPITHELIAL CELLS, UA: 0 /HPF (ref 0–5)
GFR AFRICAN AMERICAN: >60
GFR NON-AFRICAN AMERICAN: >60
GLUCOSE BLD-MCNC: 175 MG/DL (ref 70–99)
GLUCOSE URINE: >=1000 MG/DL
HCT VFR BLD CALC: 47.1 % (ref 40.5–52.5)
HEMOGLOBIN: 15.4 G/DL (ref 13.5–17.5)
HYALINE CASTS: 0 /LPF (ref 0–8)
KETONES, URINE: NEGATIVE MG/DL
LACTIC ACID: 1.3 MMOL/L (ref 0.4–2)
LEUKOCYTE ESTERASE, URINE: NEGATIVE
LIPASE: 21 U/L (ref 13–60)
LYMPHOCYTES ABSOLUTE: 1.4 K/UL (ref 1–5.1)
LYMPHOCYTES RELATIVE PERCENT: 19 %
MCH RBC QN AUTO: 31.4 PG (ref 26–34)
MCHC RBC AUTO-ENTMCNC: 32.6 G/DL (ref 31–36)
MCV RBC AUTO: 96.4 FL (ref 80–100)
MICROSCOPIC EXAMINATION: YES
MONOCYTES ABSOLUTE: 0.6 K/UL (ref 0–1.3)
MONOCYTES RELATIVE PERCENT: 8.7 %
NEUTROPHILS ABSOLUTE: 5.3 K/UL (ref 1.7–7.7)
NEUTROPHILS RELATIVE PERCENT: 70.8 %
NITRITE, URINE: NEGATIVE
PDW BLD-RTO: 13.1 % (ref 12.4–15.4)
PH UA: 7 (ref 5–8)
PLATELET # BLD: 220 K/UL (ref 135–450)
PMV BLD AUTO: 8.4 FL (ref 5–10.5)
POTASSIUM REFLEX MAGNESIUM: 4.9 MMOL/L (ref 3.5–5.1)
PROTEIN UA: ABNORMAL MG/DL
RBC # BLD: 4.89 M/UL (ref 4.2–5.9)
RBC UA: 0 /HPF (ref 0–4)
SODIUM BLD-SCNC: 137 MMOL/L (ref 136–145)
SPECIFIC GRAVITY UA: 1.02 (ref 1–1.03)
TOTAL PROTEIN: 7.2 G/DL (ref 6.4–8.2)
URINE REFLEX TO CULTURE: ABNORMAL
URINE TYPE: ABNORMAL
UROBILINOGEN, URINE: 0.2 E.U./DL
WBC # BLD: 7.5 K/UL (ref 4–11)
WBC UA: 0 /HPF (ref 0–5)

## 2022-09-17 PROCEDURE — 99285 EMERGENCY DEPT VISIT HI MDM: CPT

## 2022-09-17 PROCEDURE — 6360000004 HC RX CONTRAST MEDICATION: Performed by: PHYSICIAN ASSISTANT

## 2022-09-17 PROCEDURE — 83605 ASSAY OF LACTIC ACID: CPT

## 2022-09-17 PROCEDURE — 80076 HEPATIC FUNCTION PANEL: CPT

## 2022-09-17 PROCEDURE — 74018 RADEX ABDOMEN 1 VIEW: CPT

## 2022-09-17 PROCEDURE — 74177 CT ABD & PELVIS W/CONTRAST: CPT

## 2022-09-17 PROCEDURE — 80048 BASIC METABOLIC PNL TOTAL CA: CPT

## 2022-09-17 PROCEDURE — 85025 COMPLETE CBC W/AUTO DIFF WBC: CPT

## 2022-09-17 PROCEDURE — 71045 X-RAY EXAM CHEST 1 VIEW: CPT

## 2022-09-17 PROCEDURE — 81001 URINALYSIS AUTO W/SCOPE: CPT

## 2022-09-17 PROCEDURE — 83690 ASSAY OF LIPASE: CPT

## 2022-09-17 RX ADMIN — IOPAMIDOL 75 ML: 755 INJECTION, SOLUTION INTRAVENOUS at 16:09

## 2022-09-17 ASSESSMENT — PAIN - FUNCTIONAL ASSESSMENT: PAIN_FUNCTIONAL_ASSESSMENT: 0-10

## 2022-09-17 ASSESSMENT — PAIN SCALES - GENERAL: PAINLEVEL_OUTOF10: 3

## 2022-09-17 NOTE — DISCHARGE INSTRUCTIONS
KUB x-ray negative for constipation concern. CT scan abdomen pelvis negative for concerning constipation or obstruction. No other concerning process noted. Laboratory studies normal or within normal limits. Urinalysis showed sugar in the urine and this is consistent with your diabetes. At home I do recommend milk of magnesia 30 mL wait 2 to 3 hours and repeat that dose. I do recommend he contact healthcare provider Monday for an appointment on Wednesday or Thursday of this coming week. Return to this facility should your symptoms worsen.

## 2022-09-17 NOTE — TELEPHONE ENCOUNTER
Subjective: Caller states Leah Reeder has been having abd pain for a couple days\"     Current Symptoms: abd pain, constipation    Onset: 5 days ago; worsening    Associated Symptoms: constipation    Pain Severity: 8/10; sharp, aching; constant, waxing and waning    Temperature:   none    What has been tried: enema    Recommended disposition: go to ED now. Care advice provided, patient verbalizes understanding; denies any other questions or concerns; instructed to call back for any new or worsening symptoms. Patient/caller agrees to proceed to [facility]     Attention Provider: Thank you for allowing me to participate in the care of your patient. The patient was connected to triage in response to information provided to the ECC/PSC. Please do not respond through this encounter as the response is not directed to a shared pool.             Reason for Disposition   [1] SEVERE pain (e.g., excruciating) AND [2] present > 1 hour    Protocols used: Abdominal Pain - Male-ADULT- Patient's belongings returned

## 2022-09-17 NOTE — ED PROVIDER NOTES
[facility]        Pt Name: Masha Peck  MRN: [id number]  [place] 1950  Date of evaluation: 9/17/2022  Provider: Oswaldo Bhandari PA-C  PCP: Natacha Marcelo MD  Note Started: 2:58 PM EDT       [facility]. I have evaluated this patient. My supervising physician was available for consultation. [address]       Chief Complaint   Patient presents with    Constipation     Took miralx yest for 3 short times and hard stool last week       HISTORY OF PRESENT ILLNESS   (Location, Timing/Onset, Context/Setting, Quality, Duration, Modifying Factors, Severity, Associated Signs and Symptoms)  Note limiting factors. Chief Complaint: Abdominal pain    Masha Peck is a 67 y.o. male who presents abdominal pain progressive 1-2 weeks. States worsened about 5 days ago or this past Tuesday. Last BM about 72 hours ago. Typically has loose stool due to his diabetic medication. States he took some MiraLAX a capful yesterday and 1 again this morning. No adequate results. States lower abdominal pain right > left. No urinary complaints. No chest pain or shortness of breath. No fevers or chills. He has had prior cholecystectomy. No other abdominal surgeries. He is concerned he might have a blockage not allowing stool to pass. Nursing Notes were all reviewed and agreed with or any disagreements were addressed in the HPI. REVIEW OF SYSTEMS    (2-9 systems for level 4, 10 or more for level 5)     Review of Systems    Positives and Pertinent negatives as per HPI. Except as noted above in the ROS, all other systems were reviewed and negative.        PAST MEDICAL HISTORY     Past Medical History:   Diagnosis Date    Asthma     childhood     CAD (coronary artery disease)     Coronary artery calcification seen on CAT scan     GERD (gastroesophageal reflux disease)     Hyperlipidemia     Hypertension     Melanoma in situ ([facility]     Melanoma in situ of skin of buttock ([facility] 11/2016    RT abdomen    Moderate single current episode of major depressive disorder ([facility] 7/6/2017    Obesity     Osteoarthritis     Proteinuria     Manish Perches syndrome 1999    Rosacea     Stricture, esophagus     Type II or unspecified type diabetes mellitus without mention of complication, not stated as uncontrolled     Vitamin D deficiency          SURGICAL HISTORY     Past Surgical History:   Procedure Laterality Date    CATARACT REMOVAL Right 10/2020    CHOLECYSTECTOMY  2000    COLONOSCOPY  2002    TOE AMPUTATION Left 1980    lawnmower accident- 2 toes    UPPER GASTROINTESTINAL ENDOSCOPY  2002    VASECTOMY           CURRENTMEDICATIONS       Previous Medications    AGAMATRIX PRESTO TEST STRIP    TEST BLOOD SUGAR 8 TIMES A DAY    ALBUTEROL SULFATE  (90 BASE) MCG/ACT INHALER    2 puffs every 6 hours as needed for shortness of breath or wheezing    AMLODIPINE (NORVASC) 5 MG TABLET    TAKE 1 TABLET BY MOUTH ONE TIME A DAY    ASPIRIN EC 81 MG EC TABLET    Take 1 tablet by mouth daily. ATORVASTATIN (LIPITOR) 20 MG TABLET    TAKE 1 TABLET BY MOUTH ONE TIME A DAY    CHOLECALCIFEROL (VITAMIN D3) 1000 UNITS CAPS    Take 1,000 each by mouth daily.     CONTINUOUS BLOOD GLUC  (FREESTYLE LA 2 READER) DORA    To check glucose levels    CONTINUOUS BLOOD GLUC SENSOR (FREESTYLE LA 14 DAY SENSOR) MISC    USE AS DIRECTED CHANGE EVERY 14 DAYS    INSULIN GLARGINE (LANTUS) 100 UNIT/ML INJECTION VIAL    INJECT 34 UNITS UNDER THE SKIN ONE TIME DAILY    INSULIN LISPRO, 1 UNIT DIAL, (HUMALOG KWIKPEN) 100 UNIT/ML SOPN    INJECT 10 UNITS UNDER THE SKIN THREE TIMES A DAY BEFORE MEALS    INSULIN PEN NEEDLE (TRUEPLUS PEN NEEDLES) 31G X 8 MM MISC    USE TO INJECT INSULIN FOUR TIMES A DAY    INSULIN SYRINGE-NEEDLE U-100 (TRUEPLUS INSULIN SYRINGE) 31G X 5/16\" 1 ML MISC    USE DAILY    LOSARTAN (COZAAR) 100 MG TABLET    TAKE 1 TABLET BY MOUTH ONE TIME A DAY    OMEPRAZOLE (PRILOSEC) 40 MG DELAYED RELEASE CAPSULE    TAKE 1 CAPSULE BY MOUTH ONE TIME A DAY    SYNJARDY XR  MG TB24    TAKE 1 TABLET BY MOUTH ONE TIME A DAY         ALLERGIES     Patient has no known allergies. FAMILYHISTORY       Family History   Problem Relation Age of Onset    Cancer Father         kidney    Diabetes Father     Kidney Disease Father     Diabetes Sister     Cancer Paternal Grandfather         melanoma          SOCIAL HISTORY       Social History     Tobacco Use    Smoking status: Former     Packs/day: 2.00     Years: 15.00     Pack years: 30.00     Types: Cigarettes     Quit date:      Years since quittin.7    Smokeless tobacco: Never    Tobacco comments:     quit    Vaping Use    Vaping Use: Never used   Substance Use Topics    Alcohol use: Yes     Comment: rarely    Drug use: No       SCREENINGS    [place] Coma Scale  Eye Opening: Spontaneous  Best Verbal Response: Oriented  Best Motor Response: Obeys commands  [place] Coma Scale Score: 15        PHYSICAL EXAM    (up to 7 for level 4, 8 or more for level 5)     ED Triage Vitals   BP Temp Temp src Pulse Resp SpO2 Height Weight   -- -- -- -- -- -- -- --       Physical Exam  Vitals and nursing note reviewed. Constitutional:       Appearance: He is well-developed. HENT:      Head: Normocephalic and atraumatic. Right Ear: External ear normal.      Left Ear: External ear normal.   Eyes:      General: No scleral icterus. Right eye: No discharge. Left eye: No discharge. Conjunctiva/sclera: Conjunctivae normal.   Cardiovascular:      Rate and Rhythm: Normal rate and regular rhythm. Heart sounds: Normal heart sounds. Pulmonary:      Effort: Pulmonary effort is normal.      Breath sounds: Normal breath sounds. Abdominal:      General: Abdomen is flat. Bowel sounds are normal.      Palpations: Abdomen is soft. Tenderness: no abdominal tenderness There is no right CVA tenderness or left CVA tenderness. Comments: Scar right mid abdomen from abdominal wall/skin cancer. Tenderness appreciated lower abdomen right > left. Musculoskeletal:         General: Normal range of motion. Cervical back: Normal range of motion and neck supple. Right lower leg: No edema. Left lower leg: No edema. Skin:     General: Skin is warm and dry. Neurological:      General: No focal deficit present. Mental Status: He is alert and oriented to person, place, and time. Mental status is at baseline. Psychiatric:         Mood and Affect: Mood normal.         Behavior: Behavior normal.         Thought Content: Thought content normal.         Judgment: Judgment normal.       DIAGNOSTIC RESULTS   LABS:    Labs Reviewed   BASIC METABOLIC PANEL W/ REFLEX TO MG FOR LOW K - Abnormal; Notable for the following components:       Result Value    Glucose 175 (*)     All other components within normal limits   URINALYSIS WITH REFLEX TO CULTURE - Abnormal; Notable for the following components:    Glucose, Ur >=1000 (*)     Protein, UA TRACE (*)     All other components within normal limits   CBC WITH AUTO DIFFERENTIAL   HEPATIC FUNCTION PANEL   LACTIC ACID   LIPASE   MICROSCOPIC URINALYSIS       When ordered only abnormal lab results are displayed. All other labs were within normal range or not returned as of this dictation. EKG: When ordered, EKG's are interpreted by the Emergency Department Physician in the absence of a cardiologist.  Please see their note for interpretation of EKG. RADIOLOGY:   Non-plain film images such as CT, Ultrasound and MRI are read by the radiologist. Plain radiographic images are visualized and preliminarily interpreted by the ED Provider with the below findings:        Interpretation per the Radiologist below, if available at the time of this note:    CT ABDOMEN PELVIS W IV CONTRAST Additional Contrast? None   Final Result   No acute findings in the abdomen or pelvis.       Colonic diverticulosis. XR CHEST PORTABLE   Final Result   No radiographic evidence of an acute cardiopulmonary process. XR ABDOMEN (KUB) (SINGLE AP VIEW)   Final Result   No radiographic evidence of bowel obstruction or constipation. No results found. PROCEDURES   Unless otherwise noted below, none     Procedures    CRITICAL CARE TIME       CONSULTS:  None      EMERGENCY DEPARTMENT COURSE and DIFFERENTIAL DIAGNOSIS/MDM:   Vitals:    Vitals:    09/17/22 1459   BP: (!) 172/64   Pulse: 86   Resp: 14   Temp: 98.4 °F (36.9 °C)   SpO2: 98%   Weight: 205 lb (93 kg)   Height: 5' 5\" (1.651 m)       Patient was given the following medications:  Medications   iopamidol (ISOVUE-370) 76 % injection 75 mL (75 mLs IntraVENous Given 9/17/22 1609)         Is this patient to be included in the SEP-1 Core Measure due to severe sepsis or septic shock? No   Exclusion criteria - the patient is NOT to be included for SEP-1 Core Measure due to: Infection is not suspected    Patient presenting with concern of obstruction secondary to constipation. CT scan KUB x-ray not showing concerning constipation. Laboratory studies negative. CT scan otherwise showed no acute abdominal process. Patient reassured. Wife understanding. Recommend OTC milk of magnesia 30 mL wait 2 to 3 hours and repeat that dose as needed. Recommend continuation of MiraLAX. Recommend contact PCP on Monday for an appointment next week. They are aware to return should symptoms worsen. Both expressed understanding of the diagnosis and the treatment plan. FINAL IMPRESSION      1.  Constipation, unspecified constipation type          DISPOSITION/PLAN   DISPOSITION Decision To Discharge 09/17/2022 05:20:59 PM      PATIENT REFERRED TO:  Jamal Escobedo MD  [address] [facility] [address] [address]  [phone]    Schedule an appointment as soon as possible for a visit in 3 days      [facility] [address] [facility]  [phone]  Go to   If symptoms worsen    DISCHARGE MEDICATIONS:  New Prescriptions    No medications on file       DISCONTINUED MEDICATIONS:  Discontinued Medications    No medications on file              (Please note that portions of this note were completed with a voice recognition program.  Efforts were made to edit the dictations but occasionally words are mis-transcribed. )    Washington Pederson PA-C (electronically signed)           Washington Pederson PA-C  09/17/22 1142

## 2022-09-19 ENCOUNTER — CARE COORDINATION (OUTPATIENT)
Dept: OTHER | Facility: CLINIC | Age: 72
End: 2022-09-19

## 2022-09-19 NOTE — CARE COORDINATION
Ambulatory Care Coordination Note  2022    ACC: Clemente Keller LPN    Ambulatory Care Manager [facility]) contacted the patient by telephone to follow up on progress, discuss new issues or concerns, and reinforce/ provide patient education. Verified name and  with patient as identifiers. Spoke to patient., He states he has been struggling with constipation and mid abd cramping x2 weeks. Last Thursday, 3 days prior to his ER visit, he began with constipation. No BM x 3 days. Had a hard, small BM last . Saturday evening, took [address], had a medium semi soft BM that evening. No BM . Took Milk of Mag this morning, had a very small, hard BM this morning. Continuing with mid-quadrant abd pain, radiates from side to side and middle around umbilicus. Denies n/v, chills, fevers. No urinary issues. Patient reports eating actually relives some of the pain. ACM discussed diet for constipation and DM. Adv he can take 1 cap Miralax with 1-2 8oz glasses of water. Discussed to stay away from juices, as these will raise his sugar. Adv to take a dose of Milk of Mag this evening. No more than 60 mL/day. Increase fluids. ACM scheduled PCP appt for Wednesday at 3:40. Patient aware and wrote this down on calender. Last colon was 2012- was WNL. Patient overdue, order/referral will most likely be written on Wednesday. Patient expressed that he does plan on having a colon. Reinforced/ Provided Education:  Discussed red flags and appropriate site of care based on symptoms and resources available to patient including: PCP  Specialist  Benefits related nurse triage line  When to call 911. Importance and benefits of: Follow up with PCP and specialist, medication adherence, self monitoring and reporting of symptoms.         Care Coordination Interventions    Program Enrollment: Rising Risk  Referral from Primary Care Provider: No  Suggested Interventions and Community Resources Goals Addressed                      This Visit's Progress      Behavioral Health   No change      I will work towards the following Behavioral Health goals: I will continue to follow up with my psychologist /counselor and/or psychiatrist. and I will take my medications daily as prescribed. Barriers: lack of support and lack of education  Plan for overcoming my barriers: I will continue to follow my counselor, Desirae Chao, and be 100% compliant with my Lexapro and take my Hydroxyzine as needed. I will let my PCP, ACM, or my counselor know if I have any concerns. Confidence: 8/10  Anticipated Goal Completion Date: 12/18/21        Patient Stated (pt-stated)   Improving      I will gain independence over my diabetes and lessen my fear of hypoglycemia. Barriers: impairment:  cognitive and anxiety, fear of failure, and overwhelmed by complexity of regimen  Plan for overcoming my barriers: I will work with my ACM weekly for continued education, support, and encouragement. I will consult with my Endocrinologist if I have questions or concerns. Confidence: 8/10  Anticipated Goal Completion Date: 10/31/22              Prior to Admission medications    Medication Sig Start Date End Date Taking?  Authorizing Provider   losartan (COZAAR) 100 MG tablet TAKE 1 TABLET BY MOUTH ONE TIME A DAY 8/3/22   Bobbi Moreira MD   amLODIPine (NORVASC) 5 MG tablet TAKE 1 TABLET BY MOUTH ONE TIME A DAY 8/3/22   Bobbi Moreira MD   Insulin Syringe-Needle U-100 (TRUEPLUS INSULIN SYRINGE) 31G X 5/16\" 1 ML MISC USE DAILY 8/3/22   Bobbi Moreira MD   omeprazole (PRILOSEC) 40 MG delayed release capsule TAKE 1 CAPSULE BY MOUTH ONE TIME A DAY 8/3/22   Bobbi Moreira MD   insulin lispro, 1 Unit Dial, (HUMALOG KWIKPEN) 100 UNIT/ML SOPN INJECT 10 UNITS UNDER THE SKIN THREE TIMES A DAY BEFORE MEALS 8/3/22   Bobbi Moreira MD   SYNJARDY XR  MG TB24 TAKE 1 TABLET BY MOUTH ONE TIME A DAY 8/3/22   Bobbi Moreira MD   atorvastatin (LIPITOR) 20 MG tablet TAKE 1 TABLET BY MOUTH ONE TIME A DAY 8/3/22   Juancho Davila MD   insulin glargine (LANTUS) 100 UNIT/ML injection vial INJECT 34 UNITS UNDER THE SKIN ONE TIME DAILY 8/3/22   Juancho Davila MD   AGAMATRIX PRESTO TEST strip TEST BLOOD SUGAR 8 TIMES A DAY 5/17/22   Juancho Davila MD   Insulin Pen Needle (TRUEPLUS PEN NEEDLES) 31G X 8 MM MISC USE TO INJECT INSULIN FOUR TIMES A DAY 5/12/22   Juancho Davila MD   Continuous Blood Gluc Sensor (FREESTYLE LA 14 DAY SENSOR) MISC USE AS DIRECTED CHANGE EVERY 14 DAYS 3/24/22   Juancho Davila MD   Continuous Blood Gluc  (FREESTYLE LA 2 READER) DORA To check glucose levels 1/11/22   Juancho Davila MD   albuterol sulfate  (90 Base) MCG/ACT inhaler 2 puffs every 6 hours as needed for shortness of breath or wheezing 2/16/21   Loly Ramírez MD   Cholecalciferol (VITAMIN D3) 1000 UNITS CAPS Take 1,000 each by mouth daily. Historical Provider, MD   aspirin EC 81 MG EC tablet Take 1 tablet by mouth daily. 4/6/12   Mónica Rodríguez,        Future Appointments   Date Time Provider Nikunj Casanova   10/31/2022  2:20 PM Juancho Davila MD [facility] YESY Arce. Cresencio aB, [address] Coordinator  Associate Care Management  [address] [place] [address]  Phone: [phone]  [email or web address]

## 2022-09-21 ENCOUNTER — OFFICE VISIT (OUTPATIENT)
Dept: FAMILY MEDICINE CLINIC | Age: 72
End: 2022-09-21
Payer: MEDICARE

## 2022-09-21 VITALS
WEIGHT: 204.4 LBS | BODY MASS INDEX: 34.01 KG/M2 | HEART RATE: 84 BPM | OXYGEN SATURATION: 94 % | RESPIRATION RATE: 16 BRPM | TEMPERATURE: 99.1 F | SYSTOLIC BLOOD PRESSURE: 136 MMHG | DIASTOLIC BLOOD PRESSURE: 58 MMHG

## 2022-09-21 DIAGNOSIS — K59.09 OTHER CONSTIPATION: Primary | ICD-10-CM

## 2022-09-21 PROCEDURE — G8417 CALC BMI ABV UP PARAM F/U: HCPCS | Performed by: FAMILY MEDICINE

## 2022-09-21 PROCEDURE — G8427 DOCREV CUR MEDS BY ELIG CLIN: HCPCS | Performed by: FAMILY MEDICINE

## 2022-09-21 PROCEDURE — 1123F ACP DISCUSS/DSCN MKR DOCD: CPT | Performed by: FAMILY MEDICINE

## 2022-09-21 PROCEDURE — 1036F TOBACCO NON-USER: CPT | Performed by: FAMILY MEDICINE

## 2022-09-21 PROCEDURE — 3017F COLORECTAL CA SCREEN DOC REV: CPT | Performed by: FAMILY MEDICINE

## 2022-09-21 PROCEDURE — 99213 OFFICE O/P EST LOW 20 MIN: CPT | Performed by: FAMILY MEDICINE

## 2022-09-21 NOTE — PROGRESS NOTES
Chief complaint: Follow-Up from Hospital (Seen at [facility] due to constipation, right side flank pain)      SUBJECTIVE:  BHARATHI Garcia (:  1950) is a 67 y.o. male with a past medical history of type 2 DM who presents with a chief complaint of: f/u ED visit for constipation. CT a/p neg for SBO, etc. Reviewed ED visit. Discharged and told to use glycolax. He took milk of mag ,  and . Not last night. He has had 3 BM today. Soft, no diarrhea. No black or bloody stool. He wants to do c-scope to make sure there isn't anything going on. He's due. Last c-scope in .    Review of Systems:  General: No F/C/NS/fatigue/wt loss   Cardiovascular: No CP  Respiratory: No SOB  GI: No N/V/D/C/abd pain/blood in stool  Neuro: No HA/weakness  Psych: No depressed mood/anxiety  Musculoskeletal: No myalgias    Past Medical History:   Diagnosis Date    Asthma     childhood     CAD (coronary artery disease)     Coronary artery calcification seen on CAT scan     GERD (gastroesophageal reflux disease)     Hyperlipidemia     Hypertension     Melanoma in situ ([address]     Melanoma in situ of skin of buttock ([address] 2016    RT abdomen    Moderate single current episode of major depressive disorder ([address] 2017    Obesity     Osteoarthritis     Proteinuria     Salcedo Cristina syndrome     Rosacea     Stricture, esophagus     Type II or unspecified type diabetes mellitus without mention of complication, not stated as uncontrolled     Vitamin D deficiency      Current Outpatient Medications on File Prior to Visit   Medication Sig Dispense Refill    losartan (COZAAR) 100 MG tablet TAKE 1 TABLET BY MOUTH ONE TIME A DAY 90 tablet 0    amLODIPine (NORVASC) 5 MG tablet TAKE 1 TABLET BY MOUTH ONE TIME A DAY 90 tablet 0    Insulin Syringe-Needle U-100 (TRUEPLUS INSULIN SYRINGE) 31G X 5/16\" 1 ML MISC USE DAILY 100 each 3    omeprazole (PRILOSEC) 40 MG delayed release capsule TAKE 1 CAPSULE BY MOUTH ONE TIME A DAY 90 capsule 0 insulin lispro, 1 Unit Dial, (HUMALOG KWIKPEN) 100 UNIT/ML SOPN INJECT 10 UNITS UNDER THE SKIN THREE TIMES A DAY BEFORE MEALS 30 mL 0    SYNJARDY XR  MG TB24 TAKE 1 TABLET BY MOUTH ONE TIME A DAY 90 tablet 0    atorvastatin (LIPITOR) 20 MG tablet TAKE 1 TABLET BY MOUTH ONE TIME A DAY 90 tablet 0    insulin glargine (LANTUS) 100 UNIT/ML injection vial INJECT 34 UNITS UNDER THE SKIN ONE TIME DAILY 40 mL 0    AGAMATRIX PRESTO TEST strip TEST BLOOD SUGAR 8 TIMES A  strip 5    Insulin Pen Needle (TRUEPLUS PEN NEEDLES) 31G X 8 MM MISC USE TO INJECT INSULIN FOUR TIMES A  each 5    Continuous Blood Gluc  (FREESTYLE LA 2 READER) DORA To check glucose levels 1 each 0    albuterol sulfate  (90 Base) MCG/ACT inhaler 2 puffs every 6 hours as needed for shortness of breath or wheezing 1 Inhaler 3    Cholecalciferol (VITAMIN D3) 1000 UNITS CAPS Take 1,000 each by mouth daily. aspirin EC 81 MG EC tablet Take 1 tablet by mouth daily. 30 tablet 11    Continuous Blood Gluc Sensor (FREESTYLE LA 14 DAY SENSOR) MISC USE AS DIRECTED CHANGE EVERY 14 DAYS 6 each 2     No current facility-administered medications on file prior to visit. OBJECTIVE:  BP (!) 136/58   Pulse 84   Temp 99.1 °F (37.3 °C) (Oral)   Resp 16   Wt 204 lb 6.4 oz (92.7 kg)   SpO2 94%   BMI 34.01 kg/m²      Physical EXAM:  afebrile, vitals reviewed  Gen:  No acute distress, A/Ox3, pleasant; mentating appropriately  Eyes:  Sclerae clear, EOM intact  Neck:  No obvious thyromegaly. Heart:  Regular rate  Lungs:  breathing comfortably on RA, no cough  Abd:  non-distended  Skin: No obvious rashes    ASSESSMENT/PLAN:  1. Other constipation  New, uncontrolled. Agree w/ c-scope. He is due anyway.   F/u prn  -     [facility] - Allyn Sullivan MD, Gastroenterology, [address] [place] [address]     Electronically signed by Brooklyn Walton MD on 9/21/2022 at 3:58 PM.     Please note, portions of this note were completed with a voice recognition program.  Although every effort was made to ensure the accuracy of this automated transcription, some errors in transcription may have occurred.

## 2022-09-22 ENCOUNTER — CARE COORDINATION (OUTPATIENT)
Dept: OTHER | Facility: CLINIC | Age: 72
End: 2022-09-22

## 2022-09-22 NOTE — CARE COORDINATION
Ambulatory Care Coordination Note  2022    ACC: To Singh LPN    Ambulatory Care Manager [facility]) contacted the patient by telephone to follow up on progress, discuss new issues or concerns, and reinforce/ provide patient education. Verified name and  with patient as identifiers. Patient states his belly pain is much better. Had 3 BM's this morning. Will continue taking Milk of Mag PRN if no BM that day per PCP. He has the # for [facility]- agrees to call to schedule colon. No changes yet with insulin. He states his sugar was 350+ yesterday afternoon d/t taking his [place] much later than usual d/t his appt yesterday. Discussed compliance and importance of adding 1-2 units of insulin. Reinforced/ Provided Education:  Discussed red flags and appropriate site of care based on symptoms and resources available to patient including: PCP  Specialist  When to call 911. Importance and benefits of: Follow up with PCP and specialist, medication adherence, self monitoring and reporting of symptoms. Plan:  Continue biweekly outreaches to provide telephonic support, education and resources as needed. Discuss / follow up on: Goal progress and insulin, BG, colon scheduling     Pt verbalized understanding and is agreeable to follow up contact. Care Coordination Interventions    Program Enrollment: Rising Risk  Referral from Primary Care Provider: No  Suggested Interventions and Community Resources           Prior to Admission medications    Medication Sig Start Date End Date Taking?  Authorizing Provider   losartan (COZAAR) 100 MG tablet TAKE 1 TABLET BY MOUTH ONE TIME A DAY 8/3/22   Charleen Mora MD   amLODIPine (NORVASC) 5 MG tablet TAKE 1 TABLET BY MOUTH ONE TIME A DAY 8/3/22   Charleen Mora MD   Insulin Syringe-Needle U-100 (TRUEPLUS INSULIN SYRINGE) 31G X 16\" 1 ML MISC USE DAILY 8/3/22   Charleen Mora MD   omeprazole (PRILOSEC) 40 MG delayed release capsule TAKE 1 CAPSULE BY MOUTH ONE TIME A DAY 8/3/22   Cristina Garcia MD   insulin lispro, 1 Unit Dial, (HUMALOG KWIKPEN) 100 UNIT/ML SOPN INJECT 10 UNITS UNDER THE SKIN THREE TIMES A DAY BEFORE MEALS 8/3/22   Cristina Garcia MD   SYNJARDY XR  MG TB24 TAKE 1 TABLET BY MOUTH ONE TIME A DAY 8/3/22   Cristina Garcia MD   atorvastatin (LIPITOR) 20 MG tablet TAKE 1 TABLET BY MOUTH ONE TIME A DAY 8/3/22   Cristina Garcia MD   insulin glargine (LANTUS) 100 UNIT/ML injection vial INJECT 34 UNITS UNDER THE SKIN ONE TIME DAILY 8/3/22   Cristina Garcia MD   AGAMATRIX PRESTO TEST strip TEST BLOOD SUGAR 8 TIMES A DAY 5/17/22   Cristina Garcia MD   Insulin Pen Needle (TRUEPLUS PEN NEEDLES) 31G X 8 MM MISC USE TO INJECT INSULIN FOUR TIMES A DAY 5/12/22   Cristina Garcia MD   Continuous Blood Gluc Sensor (FREESTYLE LA 14 DAY SENSOR) MISC USE AS DIRECTED CHANGE EVERY 14 DAYS 3/24/22   Cristina Garcia MD   Continuous Blood Gluc  (FREESTYLE LA 2 READER) DORA To check glucose levels 1/11/22   Cristina Garcia MD   albuterol sulfate  (90 Base) MCG/ACT inhaler 2 puffs every 6 hours as needed for shortness of breath or wheezing 2/16/21   Valorie Leyva MD   Cholecalciferol (VITAMIN D3) 1000 UNITS CAPS Take 1,000 each by mouth daily. Historical Provider, MD   aspirin EC 81 MG EC tablet Take 1 tablet by mouth daily. 4/6/12   Petar Graham,        Future Appointments   Date Time Provider Nikunj Casanova   10/31/2022  2:20 PM MD Oma Solis. Debbie Anthony, [address] Coordinator  Associate Care Management  [address] [place] [address], [address] [place] [address] Street  Phone: [phone]  [email or web address]

## 2022-10-18 ENCOUNTER — CARE COORDINATION (OUTPATIENT)
Dept: OTHER | Facility: CLINIC | Age: 72
End: 2022-10-18

## 2022-10-18 ENCOUNTER — PATIENT MESSAGE (OUTPATIENT)
Dept: PHARMACY | Facility: CLINIC | Age: 72
End: 2022-10-18

## 2022-10-21 ENCOUNTER — TELEPHONE (OUTPATIENT)
Dept: PHARMACY | Facility: CLINIC | Age: 72
End: 2022-10-21

## 2022-10-21 NOTE — TELEPHONE ENCOUNTER
As of 10/10/22 patient has used $700 of the maximum of $600 a year in waived co pays for specific medications and pharmacy-related supplies through the [address] [place] for the DM Program.    Patient currently enrolled in the DM Program and has used all of the maximum of $600 a year in waived co pays for specific medications and pharmacy-related supplies through the [address] [employer] [place]. Courtesy call placed to patient to advise them of the above information. Patient unavailable at the time of call. Message left on TAD: Maximum waived co pays for the DM Program has been met and they will begin to be charged their co-payments once again. I left our number: [phone], option #3 if patient has any questions/concerns.       Ana Shirley, [address]   Phone: [phone], option #3       For Pharmacy Admin Tracking Only    CPA in place:  No  Time Spent (min): 10

## 2022-10-28 LAB
A/G RATIO: 2.5 (ref 1.1–2.2)
ALBUMIN SERPL-MCNC: 4.2 G/DL (ref 3.4–5)
ALP BLD-CCNC: 71 U/L (ref 40–129)
ALT SERPL-CCNC: 13 U/L (ref 10–40)
ANION GAP SERPL CALCULATED.3IONS-SCNC: 9 MMOL/L (ref 3–16)
AST SERPL-CCNC: 12 U/L (ref 15–37)
BILIRUB SERPL-MCNC: 0.3 MG/DL (ref 0–1)
BUN BLDV-MCNC: 17 MG/DL (ref 7–20)
CALCIUM SERPL-MCNC: 9 MG/DL (ref 8.3–10.6)
CHLORIDE BLD-SCNC: 105 MMOL/L (ref 99–110)
CHOLESTEROL, TOTAL: 138 MG/DL (ref 0–199)
CO2: 28 MMOL/L (ref 21–32)
CREAT SERPL-MCNC: 0.9 MG/DL (ref 0.8–1.3)
CREATININE URINE: 106 MG/DL (ref 39–259)
GFR SERPL CREATININE-BSD FRML MDRD: >60 ML/MIN/{1.73_M2}
GLUCOSE BLD-MCNC: 132 MG/DL (ref 70–99)
HDLC SERPL-MCNC: 44 MG/DL (ref 40–60)
LDL CHOLESTEROL CALCULATED: 78 MG/DL
MICROALBUMIN UR-MCNC: 15.2 MG/DL
MICROALBUMIN/CREAT UR-RTO: 143.4 MG/G (ref 0–30)
POTASSIUM SERPL-SCNC: 4 MMOL/L (ref 3.5–5.1)
SODIUM BLD-SCNC: 142 MMOL/L (ref 136–145)
TOTAL PROTEIN: 5.9 G/DL (ref 6.4–8.2)
TRIGL SERPL-MCNC: 82 MG/DL (ref 0–150)
TSH SERPL DL<=0.05 MIU/L-ACNC: 3.37 UIU/ML (ref 0.27–4.2)
VLDLC SERPL CALC-MCNC: 16 MG/DL

## 2022-10-29 LAB
ESTIMATED AVERAGE GLUCOSE: 205.9 MG/DL
HBA1C MFR BLD: 8.8 %

## 2022-10-31 ENCOUNTER — TELEMEDICINE (OUTPATIENT)
Dept: ENDOCRINOLOGY | Age: 72
End: 2022-10-31
Payer: COMMERCIAL

## 2022-10-31 DIAGNOSIS — E11.8 POORLY CONTROLLED TYPE 2 DIABETES MELLITUS WITH COMPLICATION (HCC): Primary | ICD-10-CM

## 2022-10-31 DIAGNOSIS — E66.01 MORBIDLY OBESE (HCC): ICD-10-CM

## 2022-10-31 DIAGNOSIS — I25.10 CORONARY ARTERY CALCIFICATION SEEN ON CAT SCAN: ICD-10-CM

## 2022-10-31 DIAGNOSIS — E78.2 MIXED HYPERLIPIDEMIA: ICD-10-CM

## 2022-10-31 DIAGNOSIS — I10 BENIGN ESSENTIAL HTN: ICD-10-CM

## 2022-10-31 DIAGNOSIS — E11.65 POORLY CONTROLLED TYPE 2 DIABETES MELLITUS WITH COMPLICATION (HCC): Primary | ICD-10-CM

## 2022-10-31 PROCEDURE — 99214 OFFICE O/P EST MOD 30 MIN: CPT | Performed by: INTERNAL MEDICINE

## 2022-10-31 PROCEDURE — 3052F HG A1C>EQUAL 8.0%<EQUAL 9.0%: CPT | Performed by: INTERNAL MEDICINE

## 2022-10-31 PROCEDURE — 1123F ACP DISCUSS/DSCN MKR DOCD: CPT | Performed by: INTERNAL MEDICINE

## 2022-10-31 PROCEDURE — 3017F COLORECTAL CA SCREEN DOC REV: CPT | Performed by: INTERNAL MEDICINE

## 2022-10-31 PROCEDURE — 2022F DILAT RTA XM EVC RTNOPTHY: CPT | Performed by: INTERNAL MEDICINE

## 2022-10-31 PROCEDURE — G8427 DOCREV CUR MEDS BY ELIG CLIN: HCPCS | Performed by: INTERNAL MEDICINE

## 2022-10-31 NOTE — Clinical Note
Patient has been using his meter for freestyle danyell, he is advised to bring the meter for download Schedule follow-up in 4 to 5 months I have placed lab work.

## 2022-10-31 NOTE — PROGRESS NOTES
Gui Gillis  is here for a follow-up for management of uncontrolled DM. Patient has a PMH of Type 2 DM, hypertension, hyperlipidemia , melanoma ( managed by Dr. Jaxon Jaquez), obesity. Eye surgery ( 10/.20 )     Diagnosed with Diabetes Mellitus type 2 since the age of 38 yrs,  Course has been variable . Microvascular complications: No known retinopathy (Last eye exam: 4/18), No nephropathy . No Peripheral neuropathy  No Macrovascular complications. Had a normal CTA coronary arteries in 11/2013. Home regimen:  Lantus 34 units in A.M   Humalog 6-7 units with meals. Synjardy  mg daily. Previous meds : Unable to tolerate victoza. Trulicity ( nausea)Xigduo 5-500 mg daily. Using freestyle danyell  Hypoglycemia : No significant episodes recently. Has Hypoglycemia awareness. Diet:. Eats breakfast at noon, lunch at 5 p.m and dinner at 9 P.M  Had Nutrition education:  No planned exercise. Patient also has hyperlipidemia and is on Lipitor 20 mg daily. Tolerating without side effects    He carries the diagnosis of  HTN for many yrs  Was on Exforge 5-160 mg daily. Now taking losartan 100 mg daily, amlodipine 5 mg daily. Tolerating without side effects  June 2022 office visit :Still struggles with same concept about his glucose being low when it drops from 200-150. He starts correcting his glucose  I had a lengthy discussion with the patient again today and we both agreed that he needs to see a psychiatrist for proper treatment of his anxiety and phobias. He has not been leaving home as he is worried that he will have hypoglycemia when in fact he had 0 episodes of hypoglycemia. Patient has severe phobia of hypoglycemia which is hindering his care of diabetes. Patient understands that he is currently not having any hypoglycemia. Today I discussed target glucose values with him in detail and also discussed hypoglycemia management as well as definition of hypoglycemia.   He fully understands it and realizes that he ends up correcting glucose way above 150 just to avoid hypoglycemia. On review of his continuous glucose data there was 0 documented hypoglycemia most glucose values were above 150 ranging up to 350. INTERIM    Denies any hypoglycemia, takes his short acting insulin usually after meals until his glucose starts going above 200    No Known Allergies  Outpatient Medications Marked as Taking for the 10/31/22 encounter (Telemedicine) with Chris Cates MD   Medication Sig Dispense Refill    Empagliflozin-metFORMIN HCl ER 12.5-1000 MG TB24 Take 1 tab BID 60 tablet 3    losartan (COZAAR) 100 MG tablet TAKE 1 TABLET BY MOUTH ONE TIME A DAY 90 tablet 0    amLODIPine (NORVASC) 5 MG tablet TAKE 1 TABLET BY MOUTH ONE TIME A DAY 90 tablet 0    Insulin Syringe-Needle U-100 (TRUEPLUS INSULIN SYRINGE) 31G X 5/16\" 1 ML MISC USE DAILY 100 each 3    omeprazole (PRILOSEC) 40 MG delayed release capsule TAKE 1 CAPSULE BY MOUTH ONE TIME A DAY 90 capsule 0    insulin lispro, 1 Unit Dial, (HUMALOG KWIKPEN) 100 UNIT/ML SOPN INJECT 10 UNITS UNDER THE SKIN THREE TIMES A DAY BEFORE MEALS 30 mL 0    atorvastatin (LIPITOR) 20 MG tablet TAKE 1 TABLET BY MOUTH ONE TIME A DAY 90 tablet 0    insulin glargine (LANTUS) 100 UNIT/ML injection vial INJECT 34 UNITS UNDER THE SKIN ONE TIME DAILY (Patient taking differently: INJECT 32 UNITS UNDER THE SKIN ONE TIME DAILY) 40 mL 0    AGAMATRIX PRESTO TEST strip TEST BLOOD SUGAR 8 TIMES A  strip 5    Insulin Pen Needle (TRUEPLUS PEN NEEDLES) 31G X 8 MM MISC USE TO INJECT INSULIN FOUR TIMES A  each 5    albuterol sulfate  (90 Base) MCG/ACT inhaler 2 puffs every 6 hours as needed for shortness of breath or wheezing 1 Inhaler 3    Cholecalciferol (VITAMIN D3) 1000 UNITS CAPS Take 1,000 each by mouth daily. aspirin EC 81 MG EC tablet Take 1 tablet by mouth daily. 30 tablet 11     There is no height or weight on file to calculate BMI.      Wt Readings from Last 3 Encounters:   09/21/22 204 lb 6.4 oz (92.7 kg)   22 205 lb (93 kg)   22 203 lb (92.1 kg)     BP Readings from Last 3 Encounters:   22 (!) 136/58   22 (!) 172/64   22 139/72        Past Medical History:   Diagnosis Date    Asthma     childhood     CAD (coronary artery disease)     Coronary artery calcification seen on CAT scan     GERD (gastroesophageal reflux disease)     Hyperlipidemia     Hypertension     Melanoma in situ ([facility]     Melanoma in situ of skin of buttock ([facility] 2016    RT abdomen    Moderate single current episode of major depressive disorder ([facility] 2017    Obesity     Osteoarthritis     Proteinuria     Salcedo Cristina syndrome 1999    Rosacea     Stricture, esophagus     Type II or unspecified type diabetes mellitus without mention of complication, not stated as uncontrolled     Vitamin D deficiency      Past Surgical History:   Procedure Laterality Date    CATARACT REMOVAL Right 10/2020    CHOLECYSTECTOMY      COLONOSCOPY      TOE AMPUTATION Left     lawnmower accident- 2 toes    UPPER GASTROINTESTINAL ENDOSCOPY      VASECTOMY       Family History   Problem Relation Age of Onset    Cancer Father         kidney    Diabetes Father     Kidney Disease Father     Diabetes Sister     Cancer Paternal Grandfather         melanoma     Social History     Tobacco Use   Smoking Status Former    Packs/day: 2.00    Years: 15.00    Pack years: 30.00    Types: Cigarettes    Quit date:     Years since quittin.8   Smokeless Tobacco Never   Tobacco Comments    quit       Social History     Substance and Sexual Activity   Alcohol Use Yes    Comment: rarely     Constitutional: no acute distress, well appearing and well nourished  Psychiatric: oriented to person, place and time, judgement and insight and normal, recent and remote memory intact and mood and affect are normal  Skin: skin and subcutaneous tissue is normal without visible mass,   Head and Face: visual inspection  of head and face revealed no abnormalities  Eyes: visual inspection showed no lid or conjunctival swelling, erythema or discharge, pupils are normal, equal, round  Ears/Nose: external inspection of ears and nose revealed no abnormalities, hearing is grossly normal  Oropharynx/Mouth/Face: lips, tongue and gums appear  normal with no lesions, the voice quality was normal  Neck: neck appears symmetric, with no visible masses,   Pulmonary: no increased work of breathing or signs of respiratory distress,  Musculoskeletal: normal on inspection    Neurological: normal coordination and normal general cortical function      Lab Results   Component Value Date    LABA1C 8.8 10/28/2022     Lab Results   Component Value Date    .9 10/28/2022       Assessment/Plan      --- Uncontrolled Type 2 DM  aic was 9.4 >>8.8  Patient has Severe Anxiety and phobia of having hypoglycemia  which is hindering diabetes care. As worried about glucose getting low which is not the case in the last few months   He panicks at glucose of 150 and start consuming quick acting carbs. Uncontrolled. Aic 7.8 >>8.3>>8.8  Increase Synjardy 12.5 mg BID ,currently takes once a day  ---on  lantus 32 units QAM   --Uses Humalog 5 to 8 units before each meal but mostly takes it after meals until his glucose gets above 200, he previously adamantly discussion that he should take it 10 minutes prior to eating but he still has not implemented that. Advised to reduce carb intake. Health maintenance     Advised follow-up with the ophthalmologist once a year. last one was oct 2020    Last urine microalbumin/cr ratio high>>98 in jna 2022  in march 2021 pt was told . On losartan elevated    Discussed foot care. Pt on ASA. Former smoker. 2. Hypertension. BP  at goal at home  elevated today as he is upset about his glucose values   Advised to check at home     3. Hyperlipidemia. On statins. Labs in 07/18--> 01/19---> 03/19--> 02/20      4.  Melanoma S/p surgery. TELEHEALTH EVALUATION -- Audio/Visual (During ZIQOZ-95 public health emergency)  Pursuant to the emergency declaration under the [facility], [facility] waiver authority and the [employer] Act, this Virtual  Visit was conducted, with patient's consent, to reduce the patient's risk of exposure to COVID-19 and provide care for  patient. Patient identification was verified and the caregiver was present when appropriate. The patient was located in the state where the provider is licensed to practice. The patient and/or guardian are aware that this is a billable service which includes applicable co-pays. This virtual/audio visit was conducted with patient and/or legal guardian's consent. Total time spent : 20+ reviewing the chart, conducting an interview, performing a limited exam by video and educating the patient on my assessment plan.

## 2022-11-02 ENCOUNTER — TELEPHONE (OUTPATIENT)
Dept: ENDOCRINOLOGY | Age: 72
End: 2022-11-02

## 2022-11-02 ENCOUNTER — TELEPHONE (OUTPATIENT)
Dept: PHARMACY | Facility: CLINIC | Age: 72
End: 2022-11-02

## 2022-11-02 NOTE — TELEPHONE ENCOUNTER
Pt calling and states he would like to talk to nurse about the Dr changing his medication Metformin/Synjardy? Pt states he doesn't feel comfortable taking that much of a dosage 2x a day?  Pt just had appt on Monday and that's when it was changed    # [phone]

## 2022-11-02 NOTE — TELEPHONE ENCOUNTER
Left VM for patient to call office back. Dr. Marie Alex recommends that he take the medication twice a day due to his glucose numbers. If he is not comfortable, it is his decision if he wants to go back to the other dose once a day but it is not Dr. Bessie Anderson recommendation. Also, he can take the medication with breakfast and dinner.

## 2022-11-02 NOTE — TELEPHONE ENCOUNTER
Patient called back regarding his Empagliflozin-metFORMIN HCl ER 12.5-1000 MG TB24. .. he does not know which times to take the medication and would like a call back from the nurse today.

## 2022-11-02 NOTE — TELEPHONE ENCOUNTER
I read Mr. Crenshaw the doctor's message exactly as written below. He has concerns that the new medication that was ordered 10/31 wont be exactly what he needs because Sneha Patter will be 12.5-1000 and he thinks it should be .

## 2022-11-02 NOTE — TELEPHONE ENCOUNTER
Called patient and discussed that my recommendations are to increase the [facility], he is worried that his blood sugars will drop down to low he was assured that most likely that will not happen as his blood sugars are running in the 300 range.   He was advised that if he does not want to follow the recommendations he can go back to what he is currently doing but in that case his glucose will stay high and he will end up with complications from diabetes he verbalizes understanding

## 2022-11-03 NOTE — TELEPHONE ENCOUNTER
Received documentation via fax of [id number] Flu Vaccination. Documentation scanned into EMR under the Media Tab.     Miguel Angel Arteaga, [employer]   Department, toll free: [phone], option 3

## 2022-11-03 NOTE — TELEPHONE ENCOUNTER
Received documentation of [id number] Flu Vaccination via fax. Documentation scanned into EMR under the Media Tab.     Bel Frank, [facility] [employer]   Department, toll free: [phone], option 3

## 2022-11-07 ENCOUNTER — CARE COORDINATION (OUTPATIENT)
Dept: OTHER | Facility: CLINIC | Age: 72
End: 2022-11-07

## 2022-11-07 NOTE — CARE COORDINATION
Ambulatory Care Coordination Note  2022    ACC: Jenny Brewer LPN    Ambulatory Care Manager [facility]) contacted the patient by telephone to follow up on progress, discuss new issues or concerns, and reinforce/ provide patient education. Verified name and  with patient as identifiers. Spoke to patient. He states he has not started taking the recommended new dose of Synjardy 12.5mg yet. Is to now be taking big, still taking QD. Patient continues to be non-compliant with recommendations from Dr. Florian Antonio d/t the fear of hypoglycemia. He is aware that his sugars are not low, he still fears they will \"keep dropping. \" ACM reassured patient multiple times. Patient states he has been under increased stress at home d/t multiple expensive repairs. He states he will think about taking the Synjardy BID in a few weeks until after he gets a pipe fixed in his home. Plan:  Continue outreaches to provide telephonic support, education and resources as needed. If patient continues non-compliance with recommendations and education provided to him, ACM will end episode of care and offer my support when he decides to be compliant. Pt verbalized understanding and is agreeable to follow up contact. Care Coordination Interventions    Program Enrollment: Rising Risk  Referral from Primary Care Provider: No  Suggested Interventions and Community Resources          Goals Addressed                      This Visit's Progress      Behavioral Health   Worsening      I will work towards the following Behavioral Health goals: I will continue to follow up with my psychologist /counselor and/or psychiatrist. and I will take my medications daily as prescribed. Barriers: lack of support and lack of education  Plan for overcoming my barriers: I will continue to follow my counselor, Susana Richter, and be 100% compliant with my Lexapro and take my Hydroxyzine as needed.  I will let my PCP, ACM, or my counselor know if I have any concerns. Confidence: 8/10  Anticipated Goal Completion Date: 12/18/21        Patient Stated (pt-stated)   Worsening      I will gain independence over my diabetes and lessen my fear of hypoglycemia. Barriers: impairment:  cognitive and anxiety, fear of failure, and overwhelmed by complexity of regimen  Plan for overcoming my barriers: I will work with my ACM weekly for continued education, support, and encouragement. I will consult with my Endocrinologist if I have questions or concerns. Confidence: 8/10  Anticipated Goal Completion Date: 10/31/22              Prior to Admission medications    Medication Sig Start Date End Date Taking?  Authorizing Provider   Empagliflozin-metFORMIN HCl ER 12.5-1000 MG TB24 Take 1 tab BID 10/31/22   Luc Ontiveros MD   losartan (COZAAR) 100 MG tablet TAKE 1 TABLET BY MOUTH ONE TIME A DAY 8/3/22   Luc Ontiveros MD   amLODIPine (NORVASC) 5 MG tablet TAKE 1 TABLET BY MOUTH ONE TIME A DAY 8/3/22   Luc Ontiveros MD   Insulin Syringe-Needle U-100 (TRUEPLUS INSULIN SYRINGE) 31G X 5/16\" 1 ML MISC USE DAILY 8/3/22   Luc Ontiveros MD   omeprazole (PRILOSEC) 40 MG delayed release capsule TAKE 1 CAPSULE BY MOUTH ONE TIME A DAY 8/3/22   Luc Ontiveros MD   insulin lispro, 1 Unit Dial, (HUMALOG KWIKPEN) 100 UNIT/ML SOPN INJECT 10 UNITS UNDER THE SKIN THREE TIMES A DAY BEFORE MEALS 8/3/22   Luc Ontiveros MD   atorvastatin (LIPITOR) 20 MG tablet TAKE 1 TABLET BY MOUTH ONE TIME A DAY 8/3/22   Luc Ontiveros MD   insulin glargine (LANTUS) 100 UNIT/ML injection vial INJECT 34 UNITS UNDER THE SKIN ONE TIME DAILY  Patient taking differently: INJECT 32 UNITS UNDER THE SKIN ONE TIME DAILY 8/3/22   Luc Ontiveros MD   AGAMATRIX PRESTO TEST strip TEST BLOOD SUGAR 8 TIMES A DAY 5/17/22   Luc Ontiveros MD   Insulin Pen Needle (TRUEPLUS PEN NEEDLES) 31G X 8 MM MISC USE TO INJECT INSULIN FOUR TIMES A DAY 5/12/22   Luc Ontiveros MD   albuterol sulfate  (90 Base) MCG/ACT inhaler 2 puffs every 6 hours as needed for shortness of breath or wheezing 2/16/21   Chanel Lozoya MD   Cholecalciferol (VITAMIN D3) 1000 UNITS CAPS Take 1,000 each by mouth daily. Historical Provider, MD   aspirin EC 81 MG EC tablet Take 1 tablet by mouth daily. 4/6/12   Davina Hudson,        Future Appointments   Date Time Provider Nikunj Casanova   4/3/2023  3:00 PM Duncan Shipman MD [facility] E[facility] Badillo. Hector Cano, [address] Coordinator  Associate Care Management  [address] [place] [address]  Phone: [phone]  [email or web address]

## 2022-11-14 DIAGNOSIS — E78.2 MIXED HYPERLIPIDEMIA: ICD-10-CM

## 2022-11-14 DIAGNOSIS — I10 BENIGN ESSENTIAL HTN: ICD-10-CM

## 2022-11-14 DIAGNOSIS — E11.9 DIABETES MELLITUS WITHOUT COMPLICATION (HCC): ICD-10-CM

## 2022-11-14 RX ORDER — OMEPRAZOLE 40 MG/1
CAPSULE, DELAYED RELEASE ORAL
Qty: 90 CAPSULE | Refills: 0 | Status: SHIPPED | OUTPATIENT
Start: 2022-11-14

## 2022-11-14 RX ORDER — INSULIN LISPRO 100 [IU]/ML
INJECTION, SOLUTION INTRAVENOUS; SUBCUTANEOUS
Qty: 30 ML | Refills: 0 | Status: SHIPPED | OUTPATIENT
Start: 2022-11-14

## 2022-11-14 RX ORDER — AMLODIPINE BESYLATE 5 MG/1
TABLET ORAL
Qty: 90 TABLET | Refills: 0 | Status: SHIPPED | OUTPATIENT
Start: 2022-11-14

## 2022-11-14 RX ORDER — INSULIN GLARGINE 100 [IU]/ML
INJECTION, SOLUTION SUBCUTANEOUS
Qty: 40 ML | Refills: 0 | Status: SHIPPED | OUTPATIENT
Start: 2022-11-14

## 2022-11-14 RX ORDER — LOSARTAN POTASSIUM 100 MG/1
TABLET ORAL
Qty: 90 TABLET | Refills: 0 | Status: SHIPPED | OUTPATIENT
Start: 2022-11-14

## 2022-11-14 RX ORDER — ATORVASTATIN CALCIUM 20 MG/1
TABLET, FILM COATED ORAL
Qty: 90 TABLET | Refills: 0 | Status: SHIPPED | OUTPATIENT
Start: 2022-11-14

## 2022-12-07 ENCOUNTER — CARE COORDINATION (OUTPATIENT)
Dept: OTHER | Facility: CLINIC | Age: 72
End: 2022-12-07

## 2022-12-07 NOTE — CARE COORDINATION
Ambulatory Care Coordination Note  12/7/2022    ACC: Piero Richardson LPN    Spoke to patient. He has not made any changes to his DM medication and does not feel ready to at this time. Has not started new dose of Synjardy yet. FBS this morning was 94, alarm went off on his CGM. He denies feeling any hypoglycemia s/s. He states he has been eating cereal and toast at [facility] recommended he change this to a lean protein, a vegetable, and a carb that is 15-30g. He agrees to try this. Colonoscopy scheduled for 1/11/2023- nervous/anxious about the prep. Agrees to call his provider to ask about insulin before the procedure. Care Coordination Interventions    Program Enrollment: Rising Risk  Referral from Primary Care Provider: No  Suggested Interventions and Community Resources          Goals Addressed                      This Visit's Progress      Behavioral Health   No change      I will work towards the following Behavioral Health goals: I will continue to follow up with my psychologist /counselor and/or psychiatrist. and I will take my medications daily as prescribed. Barriers: lack of support and lack of education  Plan for overcoming my barriers: I will continue to follow my counselor, Omar Chappell, and be 100% compliant with my Lexapro and take my Hydroxyzine as needed. I will let my PCP, ACM, or my counselor know if I have any concerns. Confidence: 8/10  Anticipated Goal Completion Date: 12/18/21        Patient Stated (pt-stated)   No change      I will gain independence over my diabetes and lessen my fear of hypoglycemia. Barriers: impairment:  cognitive and anxiety, fear of failure, and overwhelmed by complexity of regimen  Plan for overcoming my barriers: I will work with my ACM weekly for continued education, support, and encouragement. I will consult with my Endocrinologist if I have questions or concerns.   Confidence: 8/10  Anticipated Goal Completion Date: 10/31/22              Prior to Admission medications    Medication Sig Start Date End Date Taking? Authorizing Provider   atorvastatin (LIPITOR) 20 MG tablet TAKE 1 TABLET BY MOUTH ONE TIME A DAY 11/14/22   Chris Cates MD   amLODIPine (NORVASC) 5 MG tablet TAKE 1 TABLET BY MOUTH ONE TIME A DAY 11/14/22   Chris Cates MD   insulin lispro, 1 Unit Dial, (HUMALOG KWIKPEN) 100 UNIT/ML SOPN INJECT 10 UNITS UNDER THE SKIN THREE TIMES A DAY BEFORE MEALS 11/14/22   Chris Cates MD   omeprazole (PRILOSEC) 40 MG delayed release capsule TAKE 1 CAPSULE BY MOUTH ONE TIME A DAY 11/14/22   Chris Cates MD   insulin glargine (LANTUS) 100 UNIT/ML injection vial INJECT 32 UNITS UNDER THE SKIN ONE TIME DAILY 11/14/22   Chris Cates MD   losartan (COZAAR) 100 MG tablet TAKE 1 TABLET BY MOUTH ONE TIME A DAY 11/14/22   Chris Cates MD   Empagliflozin-metFORMIN HCl ER 12.5-1000 MG TB24 Take 1 tab BID 10/31/22   Chris Cates MD   Insulin Syringe-Needle U-100 (TRUEPLUS INSULIN SYRINGE) 31G X 5/16\" 1 ML MISC USE DAILY 8/3/22   Chris Cates MD   AGAMATRIX PRESTO TEST strip TEST BLOOD SUGAR 8 TIMES A DAY 5/17/22   Chris Cates MD   Insulin Pen Needle (TRUEPLUS PEN NEEDLES) 31G X 8 MM MISC USE TO INJECT INSULIN FOUR TIMES A DAY 5/12/22   Chris Cates MD   albuterol sulfate  (90 Base) MCG/ACT inhaler 2 puffs every 6 hours as needed for shortness of breath or wheezing 2/16/21   Jaziel Chatman MD   Cholecalciferol (VITAMIN D3) 1000 UNITS CAPS Take 1,000 each by mouth daily. Historical Provider, MD   aspirin EC 81 MG EC tablet Take 1 tablet by mouth daily. 4/6/12   Laurel Sorto,        Future Appointments   Date Time Provider Nikunj Casanova   4/3/2023  3:00 PM MD Oma Raymundo. Roxana Grey, [address] [facility] Coordinator  Associate Care Management  [address] [place] [address], [address] [place] [address]  Phone: [phone]  [email or web address]

## 2023-01-05 ENCOUNTER — CARE COORDINATION (OUTPATIENT)
Dept: OTHER | Facility: CLINIC | Age: 73
End: 2023-01-05

## 2023-01-05 NOTE — CARE COORDINATION
Patient has graduated from the Disease Specific Care Management  program on 1/5/23. Patient/family has the ability to self-manage at this time. Care management goals have been completed. No further Ambulatory Care Manager follow up scheduled. Patient continues noncompliance with insulin dosing instructions and other medication changes. Once patient becomes ready, he agrees to out reach this ACM for DM/BH care management. Goals Addressed                      This Visit's Progress      COMPLETED: Behavioral Health   Worsening      I will work towards the following Behavioral Health goals: I will continue to follow up with my psychologist /counselor and/or psychiatrist. and I will take my medications daily as prescribed. Barriers: lack of support and lack of education  Plan for overcoming my barriers: I will continue to follow my counselor, Davi Shook, and be 100% compliant with my Lexapro and take my Hydroxyzine as needed. I will let my PCP, ACM, or my counselor know if I have any concerns. Confidence: 8/10  Anticipated Goal Completion Date: 12/18/21 1/5/23- Patient continues to decline need for diabetic counseling. Patient continues to decline recommendations. COMPLETED: Patient Stated (pt-stated)   No change      I will gain independence over my diabetes and lessen my fear of hypoglycemia. Barriers: impairment:  cognitive and anxiety, fear of failure, and overwhelmed by complexity of regimen  Plan for overcoming my barriers: I will work with my ACM weekly for continued education, support, and encouragement. I will consult with my Endocrinologist if I have questions or concerns. Confidence: 8/10  Anticipated Goal Completion Date: 10/31/22    1/5/23- Patient remains non-compliant with Dr. Carol Breaux orders/recommendations. Patient has Ambulatory Care Manager's contact information for any further questions, concerns, or needs.   Patients upcoming visits:    Future Appointments   Date Time Provider Nikunj Casanova   4/3/2023  3:00 PM Kourtney Iverson MD [facility]        Sue Bennett. Ralph Wiseman, [address] Coordinator  Associate Care Management  [address] [place] [address]  Phone: [phone]  [email or web address]

## 2023-01-20 ENCOUNTER — TELEPHONE (OUTPATIENT)
Dept: PHARMACY | Facility: CLINIC | Age: 73
End: 2023-01-20

## 2023-01-20 NOTE — TELEPHONE ENCOUNTER
Patient returned call. Appt.  Scheduled for 02/06/2023 @ [address] [place] [address],[address] Only    Program: [facility] in [facility]:  No  Recommendation Provided To: Patient/Caregiver: 1 via Telephone  Intervention Detail: Scheduled Appointment  Intervention Accepted By: Patient/Caregiver: 1  Gap Closed?: Yes   Time Spent (min): 5

## 2023-01-20 NOTE — TELEPHONE ENCOUNTER
2023 Annual Pharmacist Visit  Patient is [facility]    Called patient to schedule 2023 yearly pharmacist appointment to discuss medications for Diabetes Management Program.    Left message to call us back at [phone] Option 3.       Ivania Naylor, [address] [facility]   Department, toll free: [phone] Option #3

## 2023-02-06 ENCOUNTER — TELEPHONE (OUTPATIENT)
Dept: PHARMACY | Facility: CLINIC | Age: 73
End: 2023-02-06

## 2023-02-06 NOTE — TELEPHONE ENCOUNTER
Formerly [facility] CLINICAL PHARMACY REVIEW - Be Well with Diabetes    Merlin Martin is a 67 y.o. male enrolled in the [address] [place], [place] [address] Employee Diabetes Program. Patient provided Mary Ritchie with verbal consent to remain in the program for this year. Patient enrolled 2018.     Insurance through the following employer: [address] [place] [address] (spouse)    Medications:  Current Outpatient Medications   Medication Instructions    AGAMATRIX PRESTO TEST strip TEST BLOOD SUGAR 8 TIMES A DAY    albuterol sulfate  (90 Base) MCG/ACT inhaler 2 puffs every 6 hours as needed for shortness of breath or wheezing    amLODIPine (NORVASC) 5 MG tablet TAKE 1 TABLET BY MOUTH ONE TIME A DAY    aspirin EC 81 mg, DAILY    atorvastatin (LIPITOR) 20 MG tablet TAKE 1 TABLET BY MOUTH ONE TIME A DAY    Cholecalciferol (VITAMIN D3) 1000 UNITS CAPS 1,000 each, DAILY    Empagliflozin-metFORMIN HCl ER 12.5-1000 MG TB24 Take 1 tab BID    insulin glargine (LANTUS) 100 UNIT/ML injection vial INJECT 32 UNITS UNDER THE SKIN ONE TIME DAILY    insulin lispro, 1 Unit Dial, (HUMALOG KWIKPEN) 100 UNIT/ML SOPN INJECT 10 UNITS UNDER THE SKIN THREE TIMES A DAY BEFORE MEALS    Insulin Pen Needle (TRUEPLUS PEN NEEDLES) 31G X 8 MM MISC USE TO INJECT INSULIN FOUR TIMES A DAY    Insulin Syringe-Needle U-100 (TRUEPLUS INSULIN SYRINGE) 31G X 5/16\" 1 ML MISC USE DAILY    losartan (COZAAR) 100 MG tablet TAKE 1 TABLET BY MOUTH ONE TIME A DAY    omeprazole (PRILOSEC) 40 MG delayed release capsule TAKE 1 CAPSULE BY MOUTH ONE TIME A DAY     Current Pharmacy: [facility]  Current testing supplies/frequency: [employer]   Pen needles/syringes: Leader / $0    Allergies:  No Known Allergies     Vitals/Labs:  BP Readings from Last 3 Encounters:   09/21/22 (!) 136/58   09/17/22 (!) 172/64   06/06/22 139/72     Lab Results   Component Value Date    LABMICR 15.20 (H) 10/28/2022     Lab Results   Component Value Date    LABA1C 8.8 10/28/2022 LABA1C 9.4 2022    LABA1C 9.1 2022     Lab Results   Component Value Date    CHOL 138 10/28/2022    TRIG 82 10/28/2022    HDL 44 10/28/2022    LDLCALC 78 10/28/2022     ALT   Date Value Ref Range Status   10/28/2022 13 10 - 40 U/L Final     AST   Date Value Ref Range Status   10/28/2022 12 (L) 15 - 37 U/L Final     The 10-year ASCVD risk score (Justine SOSA, et al., 2019) is: 43%    Values used to calculate the score:      Age: 67 years      Sex: Male      Is Non- : No      Diabetic: Yes      Tobacco smoker: No      Systolic Blood Pressure: 354 mmHg      Is BP treated: Yes      HDL Cholesterol: 44 mg/dL      Total Cholesterol: 138 mg/dL     Lab Results   Component Value Date    CREATININE 0.9 10/28/2022     Estimated Creatinine Clearance: 77 mL/min (based on SCr of 0.9 mg/dL).     Immunizations:  Immunization History   Administered Date(s) Administered    COVID-19, J&J, (age 18y+), IM, 0.5 mL 2021, 2021    COVID-19, [place] Vaccine, Vaccine Unspecified 2022    Influenza 10/25/2013    Influenza Virus Vaccine 10/11/2010, 2011, 10/03/2012    Influenza, FLUAD, (age 72 y+), Adjuvanted, 0.5mL 2021    Influenza, FLUZONE (age 72 y+), High Dose, 0.7mL 2020, 2022    Influenza, High Dose (Fluzone 65 yrs and older) 2015, 10/24/2016, 10/24/2017, 10/17/2018    Influenza, Triv, inactivated, subunit, adjuvanted, IM (Fluad 65 yrs and older) 10/17/2019    Pneumococcal Conjugate 13-valent ([id number]) 2015    Pneumococcal Conjugate 7-valent (Prevnar7) 2002    Pneumococcal Polysaccharide (Wrjudaihu95) 2010, 2017    Tdap (Boostrix, Adacel) 10/10/2012    Zoster Live (Zostavax) 10/25/2013      Social History:  Social History     Tobacco Use    Smoking status: Former     Packs/day: 2.00     Years: 15.00     Pack years: 30.00     Types: Cigarettes     Quit date:      Years since quittin.    Smokeless tobacco: Never    Tobacco comments:     quit 1999   Substance Use Topics    Alcohol use: Yes     Comment: rarely     ASSESSMENT:  Initial Program Requirements (Y indicates has completed for the year, N indicates needs to be completed by 07/01/2023): No - Provider Visit for DM (1st)  No- A1c (1st)      Ongoing Program Requirements (Y indicates has completed for the year, N indicates needs to be completed by 12/31/2023): No - Provider Visit for DM (2nd)  No - ACC/diabetes educator visit (if A1c over 8%)  No - A1c (2nd)  No - Lipid panel  No - Urine microalbumin  Yes - Pneumococcal vaccination: Up-to-date with Pneumo series  No - Influenza vaccination for Fall 2023  N/A - Medication adherence over 70% (insulin)  Yes - On statin or contraindication(s)  - prescribed atorvastatin  Yes - On ACEi/ARB or contraindication(s)  - prescribed losartan      Formulary Medication Review:  Non-formulary or medications with cost-effective alternatives:  n/a. Current medications eligible for copay waiver, up to $600, through [email or web address]:  - amlodipine, aspirin (with prescription), atorvastatin, Humalog, Lantus, losartan, Synjardy    - Agamatrix, [place] Ekaterina, and generic pen needles and syringes     Diabetes Care:   - Glycemic Goal: <7.0% and directed by provider. Type 2 DM under poor control as evidenced by A1c consistently >8. Last A1c did show improvement from 9.4 to 8.8. Prescribed Humalog, Lantus and Synjardy - as per many previous encounters, does not take as prescribed d/t fear of hypoglycemia. Does report he takes Humalog 8u prior to each meal now and that he did try 9u once but his BG dropped to 140 within 2h after eating, so he went back to 8u. Reports taking the Lantus 32u qAM. Uses Humalog pens and Lantus vials as an extra way to identify long v short-acting insulin.  Was taking Synjardy XR 25-1000mg daily; rx was increased to take Synjardy XR 12.5-1000mg BID, but patient has been nervous about taking 2000mg of metformin and especially taking the 2nd dose before bedtime and what his sugar would be overnight, so has only been taking Synjardy XR 12.5mg-1000mg one time daily.  - Home blood sugar records:  Using Luw[employer] Scheuermann and watches readings almost constantly; reports readings staying in 200s and often if goes <200 he will eat. Does not leave house for concern it will lower while he is out and he won't return home in time to eat. - Any episodes of hypoglycemia? no  - Medications/Classes already tried/failed: Trulicity (nausea)  - Daily aspirin? Yes - CAC per problem list  - Medication compliance:  as noted, non-adherent d/t fear of hypoglycemia    Other Considerations:  - Blood Pressure Goal: BP less than 130/80 mmHg due to history of DM: Unable to assess if at BP target.  Last 2 BPs above target were at ED and ED f/u  - Lipids: taking moderate-intensity statin (atorva 20mg)  - Smoking status:  former    PLAN:  - Consideration(s) for provider:   Discussed with patient:  Encouraged his progress with taking Humalog 8u, and before meals  If taking Synjardy 2nd dose at a different time than evening would help him feel more comfortable  Reviewed that metformin will not cause his blood sugar to go \"too low\"; patient reiterates that to him even a BG of 140 is too low  Reports he only gets 3-4h of broken sleep each night d/t concern for his BG  Reports he would also be fearful of taking both Synjardy XR together in AM, since AM is often when his sugar is at it's lowest  Trying to make small steps in delaying any \"action\" on his part to raise his BG when it starts to lower (ie, instead of eating when it goes to <200 - would he try waiting until <180) to try to provide some reassurance to him that it does not necessary mean he is headed to a hypoglycemic episode or coma  States he has had a time he let his BG go to 90 and it did come back up, but still gets nervous so hasn't done again  Psychiatry referral - states he called once and was told he needed a dietician not a psychiatrist, and had scheduled an appt another time but did not keep; he does not think it will help  - DM program gaps identified:   Initial requirements: Provider Visit for DM (1st) and A1c (1st)   Ongoing requirements: Provider visit for DM (2nd), ACC/diabetes educator visit (if A1c over 8%), A1c (2nd), Lipid panel, Urine microalbumin, and Influenza vaccination for [id number]   - Education to patient:   Discussed general issues about diabetes pathophysiology and management.   Addressed medication adherence  Reviewed diabetes education options (he stopped/declined further CC outreach) - reviewed BeWell[facility] [facility] option with health coaches and/or group sessions - patient potentially interested in group sessions to talk to others - advised I would send the info in [address] [place] [address] [phone] message   - Upcoming appointments:   Future Appointments   Date Time Provider Niuknj Casanova   4/3/2023  3:00 PM Courtney Méndez MD [facility]       Sagar Castaneda, PharmD, [facility]  Department, toll free: [phone], option 3     =======================================================   For Pharmacy Admin Tracking Only    Program: [address]:  No  Gap Closed?: Yes   Time Spent (min):  75

## 2023-02-13 ENCOUNTER — TELEPHONE (OUTPATIENT)
Dept: ENDOCRINOLOGY | Age: 73
End: 2023-02-13

## 2023-02-13 DIAGNOSIS — E78.2 MIXED HYPERLIPIDEMIA: ICD-10-CM

## 2023-02-13 DIAGNOSIS — I10 BENIGN ESSENTIAL HTN: ICD-10-CM

## 2023-02-13 DIAGNOSIS — E11.9 DIABETES MELLITUS WITHOUT COMPLICATION (HCC): ICD-10-CM

## 2023-02-13 RX ORDER — OMEPRAZOLE 40 MG/1
CAPSULE, DELAYED RELEASE ORAL
Qty: 90 CAPSULE | Refills: 0 | Status: CANCELLED | OUTPATIENT
Start: 2023-02-13

## 2023-02-14 RX ORDER — ATORVASTATIN CALCIUM 20 MG/1
TABLET, FILM COATED ORAL
Qty: 90 TABLET | Refills: 0 | Status: SHIPPED | OUTPATIENT
Start: 2023-02-14

## 2023-02-14 RX ORDER — INSULIN LISPRO 100 [IU]/ML
INJECTION, SOLUTION INTRAVENOUS; SUBCUTANEOUS
Qty: 30 ML | Refills: 0 | Status: SHIPPED | OUTPATIENT
Start: 2023-02-14

## 2023-02-14 RX ORDER — AMLODIPINE BESYLATE 5 MG/1
TABLET ORAL
Qty: 90 TABLET | Refills: 0 | Status: SHIPPED | OUTPATIENT
Start: 2023-02-14

## 2023-02-14 RX ORDER — OMEPRAZOLE 40 MG/1
CAPSULE, DELAYED RELEASE ORAL
Qty: 90 CAPSULE | Refills: 0 | Status: SHIPPED | OUTPATIENT
Start: 2023-02-14 | End: 2023-02-20 | Stop reason: SDUPTHER

## 2023-02-14 RX ORDER — INSULIN GLARGINE 100 [IU]/ML
INJECTION, SOLUTION SUBCUTANEOUS
Qty: 40 ML | Refills: 0 | Status: SHIPPED | OUTPATIENT
Start: 2023-02-14

## 2023-02-14 RX ORDER — FLASH GLUCOSE SENSOR
KIT MISCELLANEOUS
Qty: 6 EACH | Refills: 5 | Status: SHIPPED | OUTPATIENT
Start: 2023-02-14

## 2023-02-14 RX ORDER — LOSARTAN POTASSIUM 100 MG/1
TABLET ORAL
Qty: 90 TABLET | Refills: 0 | Status: SHIPPED | OUTPATIENT
Start: 2023-02-14

## 2023-02-14 RX ORDER — EMPAGLIFLOZIN, METFORMIN HYDROCHLORIDE 25; 1000 MG/1; MG/1
TABLET, EXTENDED RELEASE ORAL
Qty: 90 TABLET | Refills: 0 | Status: SHIPPED | OUTPATIENT
Start: 2023-02-14

## 2023-02-16 DIAGNOSIS — E11.9 DIABETES MELLITUS WITHOUT COMPLICATION (HCC): ICD-10-CM

## 2023-02-16 RX ORDER — OMEPRAZOLE 40 MG/1
CAPSULE, DELAYED RELEASE ORAL
Qty: 90 CAPSULE | Refills: 0 | OUTPATIENT
Start: 2023-02-16

## 2023-02-20 RX ORDER — OMEPRAZOLE 40 MG/1
CAPSULE, DELAYED RELEASE ORAL
Qty: 90 CAPSULE | Refills: 1 | Status: SHIPPED | OUTPATIENT
Start: 2023-02-20

## 2023-02-20 NOTE — TELEPHONE ENCOUNTER
Call from [facility] at [facility] stating that they didn't receive the refill request for Rx Omeprazole 40 mg     [facility] is wanting to know if Dr. Sean Kendrick would resend the script?     Please advise

## 2023-03-31 DIAGNOSIS — E11.65 POORLY CONTROLLED TYPE 2 DIABETES MELLITUS WITH COMPLICATION (HCC): ICD-10-CM

## 2023-03-31 DIAGNOSIS — E11.8 POORLY CONTROLLED TYPE 2 DIABETES MELLITUS WITH COMPLICATION (HCC): ICD-10-CM

## 2023-03-31 LAB
ALBUMIN SERPL-MCNC: 4.1 G/DL (ref 3.4–5)
ALBUMIN/GLOB SERPL: 1.9 {RATIO} (ref 1.1–2.2)
ALP SERPL-CCNC: 79 U/L (ref 40–129)
ALT SERPL-CCNC: 19 U/L (ref 10–40)
ANION GAP SERPL CALCULATED.3IONS-SCNC: 12 MMOL/L (ref 3–16)
AST SERPL-CCNC: 15 U/L (ref 15–37)
BILIRUB SERPL-MCNC: <0.2 MG/DL (ref 0–1)
BUN SERPL-MCNC: 25 MG/DL (ref 7–20)
CALCIUM SERPL-MCNC: 9.3 MG/DL (ref 8.3–10.6)
CHLORIDE SERPL-SCNC: 101 MMOL/L (ref 99–110)
CHOLEST SERPL-MCNC: 150 MG/DL (ref 0–199)
CO2 SERPL-SCNC: 26 MMOL/L (ref 21–32)
CREAT SERPL-MCNC: 1.1 MG/DL (ref 0.8–1.3)
CREAT UR-MCNC: 57.5 MG/DL (ref 39–259)
GFR SERPLBLD CREATININE-BSD FMLA CKD-EPI: >60 ML/MIN/{1.73_M2}
GLUCOSE SERPL-MCNC: 174 MG/DL (ref 70–99)
HDLC SERPL-MCNC: 56 MG/DL (ref 40–60)
LDLC SERPL CALC-MCNC: 79 MG/DL
MICROALBUMIN UR DL<=1MG/L-MCNC: 15.6 MG/DL
MICROALBUMIN/CREAT UR: 271.3 MG/G (ref 0–30)
POTASSIUM SERPL-SCNC: 4.8 MMOL/L (ref 3.5–5.1)
PROT SERPL-MCNC: 6.3 G/DL (ref 6.4–8.2)
SODIUM SERPL-SCNC: 139 MMOL/L (ref 136–145)
TRIGL SERPL-MCNC: 77 MG/DL (ref 0–150)
TSH SERPL DL<=0.005 MIU/L-ACNC: 2.61 UIU/ML (ref 0.27–4.2)
VLDLC SERPL CALC-MCNC: 15 MG/DL

## 2023-04-01 LAB
EST. AVERAGE GLUCOSE BLD GHB EST-MCNC: 217.3 MG/DL
HBA1C MFR BLD: 9.2 %

## 2023-04-03 ENCOUNTER — OFFICE VISIT (OUTPATIENT)
Dept: ENDOCRINOLOGY | Age: 73
End: 2023-04-03
Payer: MEDICARE

## 2023-04-03 VITALS
SYSTOLIC BLOOD PRESSURE: 141 MMHG | DIASTOLIC BLOOD PRESSURE: 84 MMHG | HEIGHT: 65 IN | HEART RATE: 84 BPM | BODY MASS INDEX: 35.49 KG/M2 | RESPIRATION RATE: 16 BRPM | WEIGHT: 213 LBS

## 2023-04-03 DIAGNOSIS — E11.65 POORLY CONTROLLED TYPE 2 DIABETES MELLITUS WITH COMPLICATION (HCC): Primary | ICD-10-CM

## 2023-04-03 DIAGNOSIS — E11.8 POORLY CONTROLLED TYPE 2 DIABETES MELLITUS WITH COMPLICATION (HCC): Primary | ICD-10-CM

## 2023-04-03 DIAGNOSIS — E78.2 MIXED HYPERLIPIDEMIA: ICD-10-CM

## 2023-04-03 DIAGNOSIS — Z89.422 ACQUIRED ABSENCE OF OTHER LEFT TOE(S) (HCC): ICD-10-CM

## 2023-04-03 DIAGNOSIS — I10 BENIGN ESSENTIAL HTN: ICD-10-CM

## 2023-04-03 DIAGNOSIS — E11.9 DIABETES MELLITUS WITHOUT COMPLICATION (HCC): ICD-10-CM

## 2023-04-03 DIAGNOSIS — E66.01 SEVERE OBESITY (BMI 35.0-39.9) WITH COMORBIDITY (HCC): ICD-10-CM

## 2023-04-03 PROCEDURE — 99214 OFFICE O/P EST MOD 30 MIN: CPT | Performed by: INTERNAL MEDICINE

## 2023-04-03 PROCEDURE — 2022F DILAT RTA XM EVC RTNOPTHY: CPT | Performed by: INTERNAL MEDICINE

## 2023-04-03 PROCEDURE — 1036F TOBACCO NON-USER: CPT | Performed by: INTERNAL MEDICINE

## 2023-04-03 PROCEDURE — 95251 CONT GLUC MNTR ANALYSIS I&R: CPT | Performed by: INTERNAL MEDICINE

## 2023-04-03 PROCEDURE — 3046F HEMOGLOBIN A1C LEVEL >9.0%: CPT | Performed by: INTERNAL MEDICINE

## 2023-04-03 PROCEDURE — 3017F COLORECTAL CA SCREEN DOC REV: CPT | Performed by: INTERNAL MEDICINE

## 2023-04-03 PROCEDURE — 1123F ACP DISCUSS/DSCN MKR DOCD: CPT | Performed by: INTERNAL MEDICINE

## 2023-04-03 PROCEDURE — 3079F DIAST BP 80-89 MM HG: CPT | Performed by: INTERNAL MEDICINE

## 2023-04-03 PROCEDURE — G8427 DOCREV CUR MEDS BY ELIG CLIN: HCPCS | Performed by: INTERNAL MEDICINE

## 2023-04-03 PROCEDURE — G8417 CALC BMI ABV UP PARAM F/U: HCPCS | Performed by: INTERNAL MEDICINE

## 2023-04-03 PROCEDURE — 3077F SYST BP >= 140 MM HG: CPT | Performed by: INTERNAL MEDICINE

## 2023-04-03 RX ORDER — AMLODIPINE BESYLATE 5 MG/1
TABLET ORAL
Qty: 90 TABLET | Refills: 1 | Status: SHIPPED | OUTPATIENT
Start: 2023-04-03

## 2023-04-03 RX ORDER — INSULIN LISPRO 100 [IU]/ML
INJECTION, SOLUTION INTRAVENOUS; SUBCUTANEOUS
Qty: 30 ML | Refills: 3 | Status: SHIPPED | OUTPATIENT
Start: 2023-04-03

## 2023-04-03 RX ORDER — ATORVASTATIN CALCIUM 20 MG/1
TABLET, FILM COATED ORAL
Qty: 90 TABLET | Refills: 1 | Status: SHIPPED | OUTPATIENT
Start: 2023-04-03

## 2023-04-03 RX ORDER — EMPAGLIFLOZIN, METFORMIN HYDROCHLORIDE 25; 1000 MG/1; MG/1
1 TABLET, EXTENDED RELEASE ORAL DAILY
Qty: 90 TABLET | Refills: 1 | Status: SHIPPED | OUTPATIENT
Start: 2023-04-03

## 2023-04-03 RX ORDER — LOSARTAN POTASSIUM 100 MG/1
TABLET ORAL
Qty: 90 TABLET | Refills: 1 | Status: SHIPPED | OUTPATIENT
Start: 2023-04-03

## 2023-04-03 RX ORDER — INSULIN GLARGINE 100 [IU]/ML
INJECTION, SOLUTION SUBCUTANEOUS
Qty: 40 ML | Refills: 3 | Status: SHIPPED | OUTPATIENT
Start: 2023-04-03

## 2023-04-03 RX ORDER — PEN NEEDLE, DIABETIC 31 GX5/16"
NEEDLE, DISPOSABLE MISCELLANEOUS
Qty: 400 EACH | Refills: 5 | Status: SHIPPED | OUTPATIENT
Start: 2023-04-03

## 2023-04-03 RX ORDER — SYRINGE-NEEDLE,INSULIN,0.5 ML 27GX1/2"
SYRINGE, EMPTY DISPOSABLE MISCELLANEOUS
Qty: 100 EACH | Refills: 3 | Status: SHIPPED | OUTPATIENT
Start: 2023-04-03

## 2023-04-03 NOTE — PROGRESS NOTES
Addended by: HILTON LAURENT on: 4/10/2019 10:42 AM     Modules accepted: Orders    
follow-up with the ophthalmologist once a year. last one was oct 2020    Last urine microalbumin/cr ratio high>>98 in jna 2022  in march 2021 pt was told . On losartan elevated    Discussed foot care. Pt on ASA. Former smoker. 2. Hypertension. BP  at goal at home  elevated today as he is upset about his glucose values   Advised to check at home   Denies any chest pain , SOB or headaches     3. Hyperlipidemia. On statins. Labs in 07/18--> 01/19---> 03/19--> 02/20      4. Melanoma S/p surgery. Diabetes Continuous Glucose Monitoring Report         Reason for Study:     - improve diabetic control without risk of hypoglycemia       Current Medication regimen:   Basal bolus insulin regimen      CGMS Report     CGMS data collection was performed on 4/3/2023   Patient provided information on his  diet, activities and insulin dosing  during this period. Data was available for  14  days     Sensor Data Report:   - 12 AM to 6 AM: Overnight blood glucose pattern shows hyperglycemia   - 6   AM to 10 AM:  Post breakfast hyperglycemia  is noted  --10AM to 5 PM : no  hypoglycemia observed during this time. - 5   PM to 8 PM: Post meal hyperglycemia  is noted       Average reading  233 mg/dL     Glucose variability 21.9   % of time <70 mg/dL  0 %   % of time >180  mg/dL 81%   % of time within range 19  %  Number of hypoglycemia episodes noted: 0     Impression:   CGMS shows hyperglycemia      Recommendation:      Patient was advised to make the following changes in his diabetic regimen  Increase his Premeal boluses and not to correct perceived hypoglycemia which is typically in his case a glucose of 150.

## 2023-04-19 LAB
EST. AVERAGE GLUCOSE BLD GHB EST-MCNC: 223.1 MG/DL
HBA1C MFR BLD: 9.4 %

## 2023-08-10 ENCOUNTER — CARE COORDINATION (OUTPATIENT)
Dept: OTHER | Facility: CLINIC | Age: 73
End: 2023-08-10

## 2023-08-10 NOTE — CARE COORDINATION
Ambulatory Care Coordination Note     Ambulatory Care Manager [facility])  contacted the patient via be2 message to introduce ACM program, benefits of participation, and provide contact information. Will continue to outreach to patient with a follow up phone call. Plan:  Plan for follow-up call in 7-10 days.

## 2023-08-14 ENCOUNTER — TELEPHONE (OUTPATIENT)
Dept: PHARMACY | Facility: CLINIC | Age: 73
End: 2023-08-14

## 2023-08-14 NOTE — TELEPHONE ENCOUNTER
As of 8/8/23 patient has used $538 of the maximum of $600 a year in waived co pays for specific medications and pharmacy-related supplies through the [address] for the DM Program.    Patient currently enrolled in the DM Program and is nearing the maximum of $600 a year in waived co pays for specific medications and pharmacy-related supplies through the [employer]. Courtesy call placed to patient to advise them of the above information. Patient unavailable at the time of call. Message left on TAD: Maximum waived co pays for the DM Program has almost been met. Once maximum has been reached, they will begin to be charged their co-payment once again. I left our number: [phone], option #3 if patient has any questions/concerns.       Paul Marquez, [address]   Phone: [phone], option #3     For Pharmacy Admin Tracking Only    Program: [place]il[place] in place:  No  Time Spent (min): 5

## 2023-08-31 ENCOUNTER — CARE COORDINATION (OUTPATIENT)
Dept: OTHER | Facility: CLINIC | Age: 73
End: 2023-08-31

## 2023-09-06 ENCOUNTER — OFFICE VISIT (OUTPATIENT)
Dept: FAMILY MEDICINE CLINIC | Age: 73
End: 2023-09-06

## 2023-09-06 VITALS
WEIGHT: 224 LBS | HEIGHT: 65 IN | HEART RATE: 92 BPM | BODY MASS INDEX: 37.32 KG/M2 | DIASTOLIC BLOOD PRESSURE: 76 MMHG | OXYGEN SATURATION: 96 % | SYSTOLIC BLOOD PRESSURE: 118 MMHG

## 2023-09-06 DIAGNOSIS — E11.65 TYPE 2 DIABETES MELLITUS WITH HYPERGLYCEMIA, WITH LONG-TERM CURRENT USE OF INSULIN (HCC): ICD-10-CM

## 2023-09-06 DIAGNOSIS — R22.42 LOCALIZED SWELLING OF LEFT LOWER LEG: Primary | ICD-10-CM

## 2023-09-06 DIAGNOSIS — F32.1 MODERATE SINGLE CURRENT EPISODE OF MAJOR DEPRESSIVE DISORDER (HCC): ICD-10-CM

## 2023-09-06 DIAGNOSIS — Z79.4 TYPE 2 DIABETES MELLITUS WITH HYPERGLYCEMIA, WITH LONG-TERM CURRENT USE OF INSULIN (HCC): ICD-10-CM

## 2023-09-06 PROBLEM — E11.9 TYPE 2 DIABETES MELLITUS (HCC): Status: ACTIVE | Noted: 2023-09-06

## 2023-09-06 ASSESSMENT — PATIENT HEALTH QUESTIONNAIRE - PHQ9
6. FEELING BAD ABOUT YOURSELF - OR THAT YOU ARE A FAILURE OR HAVE LET YOURSELF OR YOUR FAMILY DOWN: 0
5. POOR APPETITE OR OVEREATING: 0
4. FEELING TIRED OR HAVING LITTLE ENERGY: 0
3. TROUBLE FALLING OR STAYING ASLEEP: 0
SUM OF ALL RESPONSES TO PHQ9 QUESTIONS 1 & 2: 0
2. FEELING DOWN, DEPRESSED OR HOPELESS: 0
SUM OF ALL RESPONSES TO PHQ QUESTIONS 1-9: 0
SUM OF ALL RESPONSES TO PHQ QUESTIONS 1-9: 0
9. THOUGHTS THAT YOU WOULD BE BETTER OFF DEAD, OR OF HURTING YOURSELF: 0
SUM OF ALL RESPONSES TO PHQ QUESTIONS 1-9: 0
8. MOVING OR SPEAKING SO SLOWLY THAT OTHER PEOPLE COULD HAVE NOTICED. OR THE OPPOSITE, BEING SO FIGETY OR RESTLESS THAT YOU HAVE BEEN MOVING AROUND A LOT MORE THAN USUAL: 0
10. IF YOU CHECKED OFF ANY PROBLEMS, HOW DIFFICULT HAVE THESE PROBLEMS MADE IT FOR YOU TO DO YOUR WORK, TAKE CARE OF THINGS AT HOME, OR GET ALONG WITH OTHER PEOPLE: 0
SUM OF ALL RESPONSES TO PHQ QUESTIONS 1-9: 0
7. TROUBLE CONCENTRATING ON THINGS, SUCH AS READING THE NEWSPAPER OR WATCHING TELEVISION: 0
1. LITTLE INTEREST OR PLEASURE IN DOING THINGS: 0

## 2023-09-06 NOTE — PROGRESS NOTES
Chief complaint: Leg Swelling (Left swelling knee both legs )      SUBJECTIVE:  HPI  Sahq Nicole (:  1950) is a 75 y.o. male with a past medical history of DM2 on insulin, obesity who presents with a chief complaint of: left calf and inner left thigh swelling and pain for 4 days. Started 2-3 weeks ago with swelling. Can't bend knee because of swelling. Some swelling on the right calf all around but left leg is worse. States he had recurrent nose bleeds as a kid and it was cauterized and worried about being on blood thinner in case he gets nose bleeds again    Not doing well with DM2. Scared to take more than 8-9units insulin TIDWM. Scared it will go too low. Knows it needs to be in 140s or 180s but can't bring himself to make it happen. Doesn't like ev bragg, Dr. Amy Martin. She 'doesn't do anything' - he reports she just tells him to take more insulin and then walks out. Patient Active Problem List   Diagnosis    Hyperlipidemia    Uncontrolled type 2 diabetes mellitus with complication, with long-term current use of insulin    Vitamin D deficiency    Benign essential HTN    GERD (gastroesophageal reflux disease)    Osteoarthritis    Proteinuria    Rosacea    Asthma    Coronary artery calcification seen on CAT scan    Morbidly obese (HCC)    Moderate single current episode of major depressive disorder (HCC)    Lipoma    Acquired absence of other left toe(s) (HCC)    Severe obesity (BMI 35.0-39. 9) with comorbidity ([address])    Type 2 diabetes mellitus     Past Medical History:   Diagnosis Date    Asthma     childhood     CAD (coronary artery disease)     Coronary artery calcification seen on CAT scan     GERD (gastroesophageal reflux disease)     Hyperlipidemia     Hypertension     Melanoma in situ ([address])     Melanoma in situ of skin of buttock ([address]) 2016    RT abdomen    Moderate single current episode of major depressive disorder ([address]) 2017    Obesity     Osteoarthritis     Proteinuria     Larrie Guilherme

## 2023-09-07 ENCOUNTER — HOSPITAL ENCOUNTER (OUTPATIENT)
Dept: VASCULAR LAB | Age: 73
Discharge: HOME | End: 2023-09-07
Payer: MEDICARE

## 2023-09-07 DIAGNOSIS — R22.42 LOCALIZED SWELLING OF LEFT LOWER LEG: ICD-10-CM

## 2023-09-07 PROCEDURE — 93970 EXTREMITY STUDY: CPT

## 2023-09-08 ENCOUNTER — OFFICE VISIT (OUTPATIENT)
Dept: FAMILY MEDICINE CLINIC | Age: 73
End: 2023-09-08

## 2023-09-08 VITALS
WEIGHT: 230 LBS | SYSTOLIC BLOOD PRESSURE: 138 MMHG | DIASTOLIC BLOOD PRESSURE: 74 MMHG | BODY MASS INDEX: 38.32 KG/M2 | HEIGHT: 65 IN | HEART RATE: 84 BPM | OXYGEN SATURATION: 95 %

## 2023-09-08 DIAGNOSIS — I10 BENIGN ESSENTIAL HTN: ICD-10-CM

## 2023-09-08 DIAGNOSIS — E11.628 TYPE 2 DIABETES MELLITUS WITH OTHER SKIN COMPLICATION, WITH LONG-TERM CURRENT USE OF INSULIN (HCC): ICD-10-CM

## 2023-09-08 DIAGNOSIS — E66.01 SEVERE OBESITY (BMI 35.0-39.9) WITH COMORBIDITY (HCC): ICD-10-CM

## 2023-09-08 DIAGNOSIS — Z79.4 TYPE 2 DIABETES MELLITUS WITH OTHER SKIN COMPLICATION, WITH LONG-TERM CURRENT USE OF INSULIN (HCC): ICD-10-CM

## 2023-09-08 DIAGNOSIS — L03.116 CELLULITIS OF LEFT LOWER EXTREMITY: Primary | ICD-10-CM

## 2023-09-08 DIAGNOSIS — Z89.422 ACQUIRED ABSENCE OF OTHER LEFT TOE(S) (HCC): ICD-10-CM

## 2023-09-08 DIAGNOSIS — E11.69 DYSLIPIDEMIA ASSOCIATED WITH TYPE 2 DIABETES MELLITUS (HCC): ICD-10-CM

## 2023-09-08 DIAGNOSIS — E78.5 DYSLIPIDEMIA ASSOCIATED WITH TYPE 2 DIABETES MELLITUS (HCC): ICD-10-CM

## 2023-09-08 RX ORDER — DOXYCYCLINE HYCLATE 100 MG
100 TABLET ORAL 2 TIMES DAILY
Qty: 14 TABLET | Refills: 0 | Status: SHIPPED | OUTPATIENT
Start: 2023-09-08 | End: 2023-09-15

## 2023-09-13 ENCOUNTER — CARE COORDINATION (OUTPATIENT)
Dept: OTHER | Facility: CLINIC | Age: 73
End: 2023-09-13

## 2023-09-13 NOTE — CARE COORDINATION
Ambulatory Care Coordination Note  2023    Patient Current Location:  Home: [address] [place] [address]    ACM contacted the patient by telephone. Verified name and  with patient as identifiers. Provided introduction to self, and explanation of the ACM role. ACM: Colby Ordonez RN    Challenges to be reviewed by the provider   Additional needs identified to be addressed with provider: Yes  Patient is requesting a referral to Diabetes Education for Insulin management           Method of communication with provider: chart routing. Ambulatory Care Manager [facility]) contacted the patient to introduce the Associate Care Management Program related to Diabetes Management. ACM reviewed and updated CC Protocol , medication , education , and disease specific assessment patient was agreeable to follow up Care Management calls. Summary Note:   Patient reports he is frustrated with his DM management regimen. It is overwhelming for him and causing a lot of anxiety to try to manage is insulin doses an foods. He states he did go to DM education in the past several years ago, but does not remember everything. He states he has a lot of anxiety around having ow blood sugars and so is usually hesitant to take Humalog with eating. He states he gets nervous about it dropping too low once he sees it go to 140's. He understands that is not a low blood sugar and he denies having hypoglycemic symptoms with that BG range, but anticipates it dropping lower. Discussed at length use of Humalog insulin with food to manage BG. Pt describes how he often does not eat until he sees his BG go below 250 and then will eat and take his insulin. Reinforced education on how insulin works with timing of eating. ACM mindi mail him some pt education materials in the meantime. Pt is open to attending DM education again to learn about insulin management.   He is requesting the PCP be contacted for the order as he feels he may

## 2023-10-06 ENCOUNTER — CARE COORDINATION (OUTPATIENT)
Dept: OTHER | Facility: CLINIC | Age: 73
End: 2023-10-06

## 2023-10-06 NOTE — CARE COORDINATION
Ambulatory Care Coordination Note  10/6/2023    Patient Current Location:  Home: [address] [place] [address]     ACM contacted the patient by telephone. Verified name and  with patient as identifiers. Challenges to be reviewed by the provider   Additional needs identified to be addressed with provider: Yes  Request for DM education/ insulin management               Method of communication with provider: chart routing. ACM: Colby Ordonez RN    ACM contacted the patient to follow up on progress, discuss new issues or concerns, and reinforce/provide patient education. Assessment:  Patient reports he did receive the list of Endocrine provider, but is not sure that he will change providers yet. He did not get the order for DM education yet. ACM will send request to Current endo provider. Pt denies any new issues or concerns and is agreeable to follow up contact. Reinforced/Provided Education:  Discussed red flags and appropriate site of care based on symptoms and resources available to patient including: PCP  Specialist  Urgent care clinics. Importance and benefits of: Follow up with PCP and specialist, medication adherence, self monitoring and reporting of symptoms. Plan:  Plan for follow-up call in 5-7 days based on severity of symptoms and risk factors. Plan for next call: follow-up appointment-Discuss plan of care    Patient  verbalized understanding and is agreeable to follow up call.         Future Appointments   Date Time Provider [address]   10/16/2023  3:20 PM Tatiana Silva MD [facility]

## 2023-10-13 DIAGNOSIS — E78.2 MIXED HYPERLIPIDEMIA: ICD-10-CM

## 2023-10-13 DIAGNOSIS — I10 BENIGN ESSENTIAL HTN: ICD-10-CM

## 2023-10-13 DIAGNOSIS — E11.65 POORLY CONTROLLED TYPE 2 DIABETES MELLITUS WITH COMPLICATION (HCC): ICD-10-CM

## 2023-10-13 DIAGNOSIS — E11.8 POORLY CONTROLLED TYPE 2 DIABETES MELLITUS WITH COMPLICATION (HCC): ICD-10-CM

## 2023-10-13 LAB
ALBUMIN SERPL-MCNC: 4 G/DL (ref 3.4–5)
ALBUMIN/GLOB SERPL: 2.5 {RATIO} (ref 1.1–2.2)
ALP SERPL-CCNC: 74 U/L (ref 40–129)
ALT SERPL-CCNC: 15 U/L (ref 10–40)
ANION GAP SERPL CALCULATED.3IONS-SCNC: 8 MMOL/L (ref 3–16)
AST SERPL-CCNC: 15 U/L (ref 15–37)
BILIRUB SERPL-MCNC: 0.3 MG/DL (ref 0–1)
BUN SERPL-MCNC: 17 MG/DL (ref 7–20)
CALCIUM SERPL-MCNC: 8.8 MG/DL (ref 8.3–10.6)
CHLORIDE SERPL-SCNC: 103 MMOL/L (ref 99–110)
CHOLEST SERPL-MCNC: 143 MG/DL (ref 0–199)
CO2 SERPL-SCNC: 30 MMOL/L (ref 21–32)
CREAT SERPL-MCNC: 1.1 MG/DL (ref 0.8–1.3)
GFR SERPLBLD CREATININE-BSD FMLA CKD-EPI: >60 ML/MIN/{1.73_M2}
GLUCOSE SERPL-MCNC: 181 MG/DL (ref 70–99)
HDLC SERPL-MCNC: 45 MG/DL (ref 40–60)
LDLC SERPL CALC-MCNC: 80 MG/DL
POTASSIUM SERPL-SCNC: 4.2 MMOL/L (ref 3.5–5.1)
PROT SERPL-MCNC: 5.6 G/DL (ref 6.4–8.2)
SODIUM SERPL-SCNC: 141 MMOL/L (ref 136–145)
TRIGL SERPL-MCNC: 88 MG/DL (ref 0–150)
TSH SERPL DL<=0.005 MIU/L-ACNC: 4.07 UIU/ML (ref 0.27–4.2)
VLDLC SERPL CALC-MCNC: 18 MG/DL

## 2023-10-14 LAB
CREAT UR-MCNC: 75.3 MG/DL (ref 39–259)
EST. AVERAGE GLUCOSE BLD GHB EST-MCNC: 246 MG/DL
HBA1C MFR BLD: 10.2 %
MICROALBUMIN UR DL<=1MG/L-MCNC: 27.5 MG/DL
MICROALBUMIN/CREAT UR: 365.2 MG/G (ref 0–30)

## 2023-10-16 ENCOUNTER — OFFICE VISIT (OUTPATIENT)
Dept: ENDOCRINOLOGY | Age: 73
End: 2023-10-16
Payer: COMMERCIAL

## 2023-10-16 VITALS
SYSTOLIC BLOOD PRESSURE: 162 MMHG | HEIGHT: 65 IN | WEIGHT: 225.2 LBS | BODY MASS INDEX: 37.52 KG/M2 | HEART RATE: 80 BPM | RESPIRATION RATE: 16 BRPM | DIASTOLIC BLOOD PRESSURE: 72 MMHG

## 2023-10-16 DIAGNOSIS — E11.69 DYSLIPIDEMIA ASSOCIATED WITH TYPE 2 DIABETES MELLITUS (HCC): ICD-10-CM

## 2023-10-16 DIAGNOSIS — I10 BENIGN ESSENTIAL HTN: Primary | ICD-10-CM

## 2023-10-16 DIAGNOSIS — E11.8 POORLY CONTROLLED TYPE 2 DIABETES MELLITUS WITH COMPLICATION (HCC): ICD-10-CM

## 2023-10-16 DIAGNOSIS — E78.5 DYSLIPIDEMIA ASSOCIATED WITH TYPE 2 DIABETES MELLITUS (HCC): ICD-10-CM

## 2023-10-16 DIAGNOSIS — E11.65 POORLY CONTROLLED TYPE 2 DIABETES MELLITUS WITH COMPLICATION (HCC): ICD-10-CM

## 2023-10-16 PROCEDURE — G8417 CALC BMI ABV UP PARAM F/U: HCPCS | Performed by: INTERNAL MEDICINE

## 2023-10-16 PROCEDURE — 2022F DILAT RTA XM EVC RTNOPTHY: CPT | Performed by: INTERNAL MEDICINE

## 2023-10-16 PROCEDURE — G8427 DOCREV CUR MEDS BY ELIG CLIN: HCPCS | Performed by: INTERNAL MEDICINE

## 2023-10-16 PROCEDURE — 3017F COLORECTAL CA SCREEN DOC REV: CPT | Performed by: INTERNAL MEDICINE

## 2023-10-16 PROCEDURE — 99214 OFFICE O/P EST MOD 30 MIN: CPT | Performed by: INTERNAL MEDICINE

## 2023-10-16 PROCEDURE — 1036F TOBACCO NON-USER: CPT | Performed by: INTERNAL MEDICINE

## 2023-10-16 PROCEDURE — 3046F HEMOGLOBIN A1C LEVEL >9.0%: CPT | Performed by: INTERNAL MEDICINE

## 2023-10-16 PROCEDURE — 3077F SYST BP >= 140 MM HG: CPT | Performed by: INTERNAL MEDICINE

## 2023-10-16 PROCEDURE — G8484 FLU IMMUNIZE NO ADMIN: HCPCS | Performed by: INTERNAL MEDICINE

## 2023-10-16 PROCEDURE — 3078F DIAST BP <80 MM HG: CPT | Performed by: INTERNAL MEDICINE

## 2023-10-16 PROCEDURE — 1123F ACP DISCUSS/DSCN MKR DOCD: CPT | Performed by: INTERNAL MEDICINE

## 2023-10-16 NOTE — PROGRESS NOTES
mg/dL  0 %   % of time >180  mg/dL 87%   % of time within range 13  %  Number of hypoglycemia episodes noted: 0     Impression:   CGMS shows hyperglycemia      Recommendation:      Patient was advised to make the following changes in his diabetic regimen  Start Ozempic and reduce insulin.

## 2023-10-16 NOTE — PATIENT INSTRUCTIONS
Patient Education        semaglutide (oral/injection)  Pronunciation:  EMIR a GLOO tide  Brand:  Ozempic, Ozempic (1 mg dose), Rybelsus, Wegovy (0.25 mg dose), Wegovy (0.5 mg dose), Wegovy (1 mg dose), Wegovy (1.7 mg dose), Wegovy (2.4 mg dose)  What is the most important information I should know about semaglutide? Call your doctor at once if you have signs of a thyroid tumor, such as swelling or a lump in your neck, trouble swallowing, a hoarse voice, or shortness of breath. You should not use semaglutide if you have multiple endocrine neoplasia type 2 (tumors in your glands), or a personal or family history of medullary thyroid cancer. What is semaglutide? Semaglutide (Rybelsus and Ozempic) is used with diet and exercise to improve blood sugar control in adults with type 2 diabetes mellitus (not for type 1 diabetes). Austin Brisa is used with diet and exercise to manage weight in overweight adults who also have least one weight-related medical condition such as type 2 diabetes, high blood pressure, or high cholesterol. Semaglutide may also be used for purposes not listed in this medication guide. What should I discuss with my healthcare provider before using semaglutide? You should not use semaglutide if you are allergic to it, or if you have:  multiple endocrine neoplasia type 2 (tumors in your glands); or  a personal or family history of medullary thyroid carcinoma (a type of thyroid cancer). Tell your doctor if you have ever had:  a stomach or intestinal disorder;  pancreatitis;  kidney disease; or  eye problems caused by diabetes (retinopathy). In animal studies, semaglutide caused thyroid tumors or thyroid cancer. It is not known whether these effects would occur in people. Ask your doctor about your risk. Stop using this medicine at least 2 months before you plan to get pregnant. Ask your doctor for a safer medicine to use during this time.  Controlling diabetes is very important during pregnancy, as is

## 2023-10-17 ENCOUNTER — TELEPHONE (OUTPATIENT)
Dept: ENDOCRINOLOGY | Age: 73
End: 2023-10-17

## 2023-10-17 NOTE — TELEPHONE ENCOUNTER
Submitted PA for Ozempic  Via CMM Key: [id number]   STATUS: Approved  Determination Favorable  10 minutes ago

## 2023-10-18 DIAGNOSIS — E78.2 MIXED HYPERLIPIDEMIA: ICD-10-CM

## 2023-10-18 DIAGNOSIS — E11.9 DIABETES MELLITUS WITHOUT COMPLICATION (HCC): ICD-10-CM

## 2023-10-18 DIAGNOSIS — I10 BENIGN ESSENTIAL HTN: ICD-10-CM

## 2023-10-18 RX ORDER — AMLODIPINE BESYLATE 5 MG/1
TABLET ORAL
Qty: 90 TABLET | Refills: 1 | Status: SHIPPED | OUTPATIENT
Start: 2023-10-18

## 2023-10-18 RX ORDER — ATORVASTATIN CALCIUM 20 MG/1
TABLET, FILM COATED ORAL
Qty: 90 TABLET | Refills: 1 | Status: SHIPPED | OUTPATIENT
Start: 2023-10-18

## 2023-10-18 RX ORDER — OMEPRAZOLE 40 MG/1
CAPSULE, DELAYED RELEASE ORAL
Qty: 90 CAPSULE | Refills: 1 | Status: SHIPPED | OUTPATIENT
Start: 2023-10-18

## 2023-10-18 RX ORDER — LOSARTAN POTASSIUM 100 MG/1
TABLET ORAL
Qty: 90 TABLET | Refills: 1 | Status: SHIPPED | OUTPATIENT
Start: 2023-10-18

## 2023-10-18 RX ORDER — EMPAGLIFLOZIN, METFORMIN HYDROCHLORIDE 25; 1000 MG/1; MG/1
1 TABLET, EXTENDED RELEASE ORAL DAILY
Qty: 90 TABLET | Refills: 1 | Status: SHIPPED | OUTPATIENT
Start: 2023-10-18

## 2023-10-19 ENCOUNTER — TELEPHONE (OUTPATIENT)
Dept: PHARMACY | Facility: CLINIC | Age: 73
End: 2023-10-19

## 2023-10-19 NOTE — TELEPHONE ENCOUNTER
[facility] Employee Diabetes Program    Adelia Barbosa is a 75 y.o. male enrolled in the [facility] with Diabetes Program. The goal of this voluntary program is to help employees and covered dependents reach their health maintenance goals in regards to their diabetes diagnosis. According to our records, patient is missing the following requirement(s) that must be completed by December 31, 2023 to avoid discharge from the program:  Flu vaccine     Plan:  Buk[employer] message sent.     Nancy Rodríguez, PharmD,  [facility]  Department, toll free: [phone]    For Pharmacy Admin Tracking Only    Program: [address] [place] [address]  Time Spent (min): 5

## 2023-10-23 ENCOUNTER — TELEPHONE (OUTPATIENT)
Dept: ENDOCRINOLOGY | Age: 73
End: 2023-10-23

## 2023-10-23 NOTE — TELEPHONE ENCOUNTER
Call from pt requesting to speak with Dr. Juarez Ferreira or Carlota Drake. Pt stated that he is wanting to know if he should stop the his insulin once he starts taking Ozempic?     Please advise   CB# [phone]

## 2023-10-23 NOTE — TELEPHONE ENCOUNTER
Returned patients call. Pt states he has not started the Ozempic yet but he will be getting it. I advised that he should continue on the insulin when he started the Ozempic. If he starts to notice his glucose numbers going down, he can let us know and we can decrease his insulin. Patient is worried it is going to make him bottom out. I advised that Ozempic does not work instantly but rather overtime. Patient disagrees and would like a call from Dr. Brad Walker.

## 2023-10-24 ENCOUNTER — CARE COORDINATION (OUTPATIENT)
Dept: OTHER | Facility: CLINIC | Age: 73
End: 2023-10-24

## 2023-10-24 NOTE — CARE COORDINATION
pharmacist can discuss and explain his medications to him also and reassured him he has several resources available to support him. Pt verbalized understanding. He denies need for these services right now until he has started the Ozempic and sees how it works for him. Reinforced/Provided Education:  Discussed red flags and appropriate site of care based on symptoms and resources available to patient including: PCP  PushPage Messaging  DM Management program .   Provided the following associate/dependent related resources: [employer]. Download the free [employer] and create and account. Go to [email or web address] to create an account. Your copay is $15     Medication compliance  Disease Process and complications  Blood glucose monitoring- purpose, monitoring frequency, and compliance  Symptoms of Hypo/Hyperglycemia and how to treat  Meal Planning   Importance and benefits of: Follow up with PCP and specialist, medication adherence, self monitoring and reporting of symptoms. Plan:  Plan for follow-up call in 7-10 days based on severity of symptoms and risk factors. Plan for next call: symptom management-Assess for new or worsening symptoms and BG ranges  medication management-Discuss Ozempic effectiveness and insulin dose changes      Patient  verbalized understanding and is agreeable to follow up call. Diabetes Assessment    Medic Alert ID: No  Meal Planning: Avoidance of concentrated sweets   How often do you test your blood sugar?: Daily, Meals, Other (Comment: test 4-6x daily, 30 minutes after meal starts)   Do you have barriers with adherence to non-pharmacologic self-management interventions?  (Nutrition/Exercise/Self-Monitoring): Yes   Have you ever had to go to the ED for symptoms of low blood sugar?: No       Other symptoms causing concern   Do you have hyperglycemia symptoms?: No   Do you have hypoglycemia symptoms?: No   Last Blood Sugar Value: 268   Blood Sugar

## 2023-11-08 RX ORDER — ACYCLOVIR 400 MG/1
TABLET ORAL
Qty: 9 EACH | Refills: 3 | Status: SHIPPED | OUTPATIENT
Start: 2023-11-08

## 2023-11-08 RX ORDER — ACYCLOVIR 400 MG/1
TABLET ORAL
Qty: 1 EACH | Refills: 0 | Status: SHIPPED | OUTPATIENT
Start: 2023-11-08

## 2023-11-08 NOTE — TELEPHONE ENCOUNTER
Thank you! Reynaldo Rangel, PharmD,  [facility]  Department, toll free: [phone].     For Pharmacy Admin Tracking Only    Program: [facility] in place:  No  Recommendation Provided To: Provider: 1 via Note to Provider and Patient/Caregiver: 1 via Telephone  Intervention Detail: New Rx: 1, reason: Patient Preference  Intervention Accepted By: Provider: 1 and Patient/Caregiver: 1  Gap Closed?: Yes   Time Spent (min): 15

## 2023-11-08 NOTE — TELEPHONE ENCOUNTER
Dr. Rita Jung,    Your patient is currently enrolled in the Be Well with Diabetes program. After your patient[facility] Pharmacist, the below were identified as opportunities to assist with their diabetes management (if patient is not eligible for below recommendations, please reply with reason/contraindication):   Pt reached out to our team today showing interest in the [address] system to replace his current [place] system. We discussed the system in great detail and he was interested in switching in the near future. He will actually have to switch after 1/1 no matter what due to a formulary change. Vo is also going to improve significantly. I have pended orders for a  and sensors if you are ok with it. Thank you,  BRIANNA Sampson, PharmD,  [facility]  Department, toll free: [phone]

## 2023-11-15 ENCOUNTER — TELEPHONE (OUTPATIENT)
Dept: PHARMACY | Facility: CLINIC | Age: 73
End: 2023-11-15

## 2023-11-15 NOTE — TELEPHONE ENCOUNTER
Patient returned call and I let him know As of 11/06/23 patient has used $726 of the maximum of $600 a year in waived co pays for specific medications and pharmacy-related supplies through the [address] [place] [address] for the DM Program.    Patient was thankful for the call and wanted to make sure there is a rx on file for dexcom g7  and sensor. I let him know they both are on file as of 11/08/23.     He wanted to be transferred to [facility] and I successfully transferred the call     For Pharmacy Admin Tracking Only    Program: [facility] in place:  No  Time Spent (min): 10

## 2023-11-15 NOTE — TELEPHONE ENCOUNTER
As of 11/06/23 patient has used $726 of the maximum of $600 a year in waived co pays for specific medications and pharmacy-related supplies through the [address] [employer] for the DM Program.    Patient currently enrolled in the DM Program and has used all of the maximum of $600 a year in waived co pays for specific medications and pharmacy-related supplies through the [address] [employer]. Courtesy call placed to patient at mobile number to advise them of the above information. Patient unavailable at the time of call. Message left on TAD: Maximum waived co pays for the DM Program has been met and they will begin to be charged their co-payments once again. I left our number: [phone] Option #3 if patient has any questions/concerns.     Lynne Strickland   Department, toll free: [phone] Option #3    For Pharmacy Admin Tracking Only    Program: [facility] in place:  No  Gap Closed?: Yes   Time Spent (min): 5

## 2023-11-27 ENCOUNTER — CARE COORDINATION (OUTPATIENT)
Dept: OTHER | Facility: CLINIC | Age: 73
End: 2023-11-27

## 2023-11-27 NOTE — CARE COORDINATION
Ambulatory Care Coordination Note    ACM attempted to reach patient for care management follow up call regarding Progress and medication compliance. HIPAA compliant message left requesting a return phone call at patient convenience.  Will send [employer] message in the meantime    Plan for follow-up call in 10-14 days    Future Appointments   Date Time Provider [address] [place] [address]   3/4/2024  2:40 PM Jovan Shay MD [facility]

## 2023-12-11 ENCOUNTER — CARE COORDINATION (OUTPATIENT)
Dept: OTHER | Facility: CLINIC | Age: 73
End: 2023-12-11

## 2023-12-11 NOTE — CARE COORDINATION
Ambulatory Care Coordination Note    ACM attempted to reach patient for care management follow up call regarding medication compliance, BG ranges, progress, and ongoing ACM needs. HIPAA compliant message left requesting a return phone call at patient convenience.      Plan for follow-up call in 10-14 days    Future Appointments   Date Time Provider [address] [place] [address]   3/4/2024  2:40 PM Marlene Lockhart MD [facility]

## 2023-12-12 ENCOUNTER — CARE COORDINATION (OUTPATIENT)
Dept: OTHER | Facility: CLINIC | Age: 73
End: 2023-12-12

## 2023-12-12 NOTE — CARE COORDINATION
Ambulatory Care Coordination Note  2023    Patient Current Location:  Home: [address] [address]     ACM contacted the patient by telephone. Verified name and  with patient as identifiers. Challenges to be reviewed by the provider   Additional needs identified to be addressed with provider: No  none         Method of communication with provider: none. ACM: Ruben Buckley RN    ACM contacted the patient to follow up on progress, discuss new issues or concerns, and reinforce/provide patient education. Assessment:  Patient reports that he is doing much better. He did start the [address] and feels it is working very well for him. He has not had any big swings in his BG readings. He is keeping it below 200 for most of the time now. He is not having the anxiety about hypoglycemia tht he was with the insulin. He is still taking his insulin he is just not needing as much. He states he has lost about 20 lbs and does not have the swelling in his feet and legs like he did before. He also got a new CBMG the G7 and it is very easy to use. He finds that he is not getting anxious when his BG is getting to 130s. Reinforced BG and A1c goal ranges. Pt verbalized understanding. He describes his daily diet and is following his DM diet much better than previous contact. He feels like he is getting his life back and not feeling like he is tied to his house in case he has hypoglycemia. He denies any Immediate or ongoing ACM needs at this time and will contact AC in the future if needs change. Reinforced/Provided Education:  Discussed red flags and appropriate site of care based on symptoms and resources available to patient including: PCP  Benefits related nurse triage line  MyChart Messaging  Condition related references. Importance and benefits of: Follow up with PCP and specialist, medication adherence, self monitoring and reporting of symptoms.       Plan:  No further follow-up

## 2024-01-10 ENCOUNTER — TELEPHONE (OUTPATIENT)
Dept: PHARMACY | Facility: CLINIC | Age: 74
End: 2024-01-10

## 2024-01-10 NOTE — TELEPHONE ENCOUNTER
Patient returned call and scheduled an appt on      Appt at 12:30 PM (30 min)     For Pharmacy Admin Tracking Only    Program: [employer]  CPA in place:  No  Recommendation Provided To: Patient/Caregiver: 1 via Telephone  Intervention Detail: Adherence Monitorin  Intervention Accepted By: Patient/Caregiver: 1  Gap Closed?: Yes   Time Spent (min): 5

## 2024-01-10 NOTE — TELEPHONE ENCOUNTER
**Patient is [facility]**    Called patient to schedule 2024 yearly pharmacist appointment to discuss medications for Diabetes Management Program.    No answer. Left VM on mobile TAD  Please call back at [phone] Option #3.    Mary Castillo CphT    [facility]    Clinical Pharmacy     Department, toll free: [phone] Option #3

## 2024-01-18 ENCOUNTER — TELEPHONE (OUTPATIENT)
Dept: PHARMACY | Facility: CLINIC | Age: 74
End: 2024-01-18

## 2024-01-18 NOTE — TELEPHONE ENCOUNTER
prescribed a ACEi/ARB:yes - Losartan 100mg  BP less than 130/80 mmHg due to history of DM: at goal for the most part. Last reading elevated, but unknown why.  Previous readings at goal.  Adherence not a concern  Recommendation: Pt to check BP periodically and alert provider if consistently >130/90  - Lipids:  Patient prescribed a statin:yes - Atorvastatin 20- mod int  Other lipid lowering therapy?no  Patient has:Patients with diabetes mellitus who have multiple ASCVD risk factors-it is reasonable to prescribe high-intensity statin therapy with the aim to reduce LDL-C by 50% or more (ADA 2023)  LDL Target goal: <70 mg/dL- Clinical ASCVD  Recommendation:Continue current statin therapy.  Could consider high intensity, but pt tolerating current dose and not interested in changing.    PLAN:  - Consideration(s) for patient:  Pt to continue taking all meds as prescribed.  Strongly encouraged use of humalog more frequently.  Discussed Ozempic. Pt to request refill through [facility] when due. Will be before next OV with endo.  Advised to send request and not skip doses  - DM program gaps identified:   Requirements recommended to be completed by 7/1: Provider Visit for DM (1st) and A1c (1st)   Requirements due by 12/31: Provider visit for DM (2nd), ACC/diabetes educator visit (if A1c over 8%), A1c (2nd), Lipid panel, Urine microalbumin, and Influenza vaccination for [id number]   - Education to patient: Discussed general issues about diabetes pathophysiology and management., Addressed diet and exercise, Discussed hypoglycemia- Identifying and Correcting, Addressed medication adherence, Overview of Diabetes program- Benefits/Requirements, and Overview of [facility]   - Follow up: Endocrinologist for identified gaps or as scheduled below    - Upcoming appointments:   Future Appointments   Date Time Provider Department Center   3/4/2024  2:40 PM Mel Perdomo MD [facility] Andy Blust, PharmD,

## 2024-01-31 ENCOUNTER — PATIENT MESSAGE (OUTPATIENT)
Dept: ENDOCRINOLOGY | Age: 74
End: 2024-01-31

## 2024-02-01 RX ORDER — SEMAGLUTIDE 0.68 MG/ML
INJECTION, SOLUTION SUBCUTANEOUS
Qty: 3 ML | Refills: 3 | Status: SHIPPED | OUTPATIENT
Start: 2024-02-01

## 2024-02-01 NOTE — TELEPHONE ENCOUNTER
From: Shai Crenshaw  To: Dr. Mel Perdomo  Sent: 1/31/2024 5:16 PM EST  Subject: Ozempic    I just sent request for new refill  Would like to stay at .5 dose it is really doing great   My GMI on meter is 7.9   And glucose is 160 average   You won’t believe my numbers when I see you in march

## 2024-03-01 DIAGNOSIS — E11.8 POORLY CONTROLLED TYPE 2 DIABETES MELLITUS WITH COMPLICATION (HCC): ICD-10-CM

## 2024-03-01 DIAGNOSIS — E11.69 DYSLIPIDEMIA ASSOCIATED WITH TYPE 2 DIABETES MELLITUS (HCC): ICD-10-CM

## 2024-03-01 DIAGNOSIS — E11.65 POORLY CONTROLLED TYPE 2 DIABETES MELLITUS WITH COMPLICATION (HCC): ICD-10-CM

## 2024-03-01 DIAGNOSIS — I10 BENIGN ESSENTIAL HTN: ICD-10-CM

## 2024-03-01 DIAGNOSIS — E78.5 DYSLIPIDEMIA ASSOCIATED WITH TYPE 2 DIABETES MELLITUS (HCC): ICD-10-CM

## 2024-03-01 LAB
ALBUMIN SERPL-MCNC: 4.1 G/DL (ref 3.4–5)
ALBUMIN/GLOB SERPL: 1.8 {RATIO} (ref 1.1–2.2)
ALP SERPL-CCNC: 88 U/L (ref 40–129)
ALT SERPL-CCNC: 11 U/L (ref 10–40)
ANION GAP SERPL CALCULATED.3IONS-SCNC: 10 MMOL/L (ref 3–16)
AST SERPL-CCNC: 15 U/L (ref 15–37)
BILIRUB SERPL-MCNC: 0.3 MG/DL (ref 0–1)
BUN SERPL-MCNC: 15 MG/DL (ref 7–20)
CALCIUM SERPL-MCNC: 9.3 MG/DL (ref 8.3–10.6)
CHLORIDE SERPL-SCNC: 100 MMOL/L (ref 99–110)
CHOLEST SERPL-MCNC: 171 MG/DL (ref 0–199)
CO2 SERPL-SCNC: 30 MMOL/L (ref 21–32)
CREAT SERPL-MCNC: 1 MG/DL (ref 0.8–1.3)
CREAT UR-MCNC: 118.9 MG/DL (ref 39–259)
GFR SERPLBLD CREATININE-BSD FMLA CKD-EPI: >60 ML/MIN/{1.73_M2}
GLUCOSE SERPL-MCNC: 149 MG/DL (ref 70–99)
HDLC SERPL-MCNC: 41 MG/DL (ref 40–60)
LDLC SERPL CALC-MCNC: 106 MG/DL
MICROALBUMIN UR DL<=1MG/L-MCNC: 13.7 MG/DL
MICROALBUMIN/CREAT UR: 115.2 MG/G (ref 0–30)
POTASSIUM SERPL-SCNC: 4.5 MMOL/L (ref 3.5–5.1)
PROT SERPL-MCNC: 6.4 G/DL (ref 6.4–8.2)
SODIUM SERPL-SCNC: 140 MMOL/L (ref 136–145)
TRIGL SERPL-MCNC: 121 MG/DL (ref 0–150)
TSH SERPL DL<=0.005 MIU/L-ACNC: 3.73 UIU/ML (ref 0.27–4.2)
VLDLC SERPL CALC-MCNC: 24 MG/DL

## 2024-03-02 LAB
EST. AVERAGE GLUCOSE BLD GHB EST-MCNC: 185.8 MG/DL
HBA1C MFR BLD: 8.1 %

## 2024-03-04 ENCOUNTER — OFFICE VISIT (OUTPATIENT)
Dept: ENDOCRINOLOGY | Age: 74
End: 2024-03-04
Payer: COMMERCIAL

## 2024-03-04 VITALS
BODY MASS INDEX: 34.16 KG/M2 | WEIGHT: 205 LBS | RESPIRATION RATE: 14 BRPM | HEART RATE: 87 BPM | TEMPERATURE: 98 F | DIASTOLIC BLOOD PRESSURE: 68 MMHG | SYSTOLIC BLOOD PRESSURE: 108 MMHG | HEIGHT: 65 IN

## 2024-03-04 DIAGNOSIS — Z79.4 TYPE 2 DIABETES MELLITUS WITH OTHER SKIN COMPLICATION, WITH LONG-TERM CURRENT USE OF INSULIN (HCC): Primary | ICD-10-CM

## 2024-03-04 DIAGNOSIS — E11.628 TYPE 2 DIABETES MELLITUS WITH OTHER SKIN COMPLICATION, WITH LONG-TERM CURRENT USE OF INSULIN (HCC): Primary | ICD-10-CM

## 2024-03-04 DIAGNOSIS — E66.01 SEVERE OBESITY (BMI 35.0-39.9) WITH COMORBIDITY (HCC): ICD-10-CM

## 2024-03-04 PROCEDURE — 95251 CONT GLUC MNTR ANALYSIS I&R: CPT | Performed by: INTERNAL MEDICINE

## 2024-03-04 PROCEDURE — 3074F SYST BP LT 130 MM HG: CPT | Performed by: INTERNAL MEDICINE

## 2024-03-04 PROCEDURE — 3078F DIAST BP <80 MM HG: CPT | Performed by: INTERNAL MEDICINE

## 2024-03-04 PROCEDURE — 3052F HG A1C>EQUAL 8.0%<EQUAL 9.0%: CPT | Performed by: INTERNAL MEDICINE

## 2024-03-04 PROCEDURE — 1123F ACP DISCUSS/DSCN MKR DOCD: CPT | Performed by: INTERNAL MEDICINE

## 2024-03-04 PROCEDURE — 99214 OFFICE O/P EST MOD 30 MIN: CPT | Performed by: INTERNAL MEDICINE

## 2024-03-04 NOTE — PROGRESS NOTES
Shai Crenshaw  is here for a follow-up for management of uncontrolled DM.  Patient has a PMH of Type 2 DM, hypertension, hyperlipidemia , melanoma ( managed by Dr. Ledezma), obesity. Eye surgery ( 10/.20 )     Diagnosed with Diabetes Mellitus type 2 since the age of 44 yrs,  Course has been variable .  Microvascular complications: No known retinopathy (Last eye exam: 4/18), No nephropathy .   No Peripheral neuropathy  No Macrovascular complications. Had a normal CTA coronary arteries in 11/2013.  Home regimen:  Lantus 34 units in A.M   Humalog 6-7 units with meals.  Synjardy  mg daily.  Previous meds : Unable to tolerate victoza.Trulicity ( nausea)Xigduo 5-500 mg daily.   Using freestyle danyell  Hypoglycemia : No significant episodes recently.   Has Hypoglycemia awareness.  Diet:. Eats breakfast at noon, lunch at 5 p.m and dinner at 9 P.M  Had Nutrition education:  No planned exercise.    Patient also has hyperlipidemia and is on Lipitor 20 mg daily.  Tolerating without side effects    He carries the diagnosis of  HTN for many yrs  Was on Exforge 5-160 mg daily.  Now taking losartan 100 mg daily, amlodipine 5 mg daily.  Tolerating without side effects  June 2022 office visit :Still struggles with same concept about his glucose being low when it drops from 200-150.  He starts correcting his glucose  I had a lengthy discussion with the patient again today and we both agreed that he needs to see a psychiatrist for proper treatment of his anxiety and phobias.  He has not been leaving home as he is worried that he will have hypoglycemia when in fact he had 0 episodes of hypoglycemia.  Patient has severe phobia of hypoglycemia which is hindering his care of diabetes.  Patient understands that he is currently not having any hypoglycemia.  Today I discussed target glucose values with him in detail and also discussed hypoglycemia management as well as definition of hypoglycemia.  He fully understands it and realizes that he

## 2024-03-29 ENCOUNTER — TELEPHONE (OUTPATIENT)
Dept: PHARMACY | Facility: CLINIC | Age: 74
End: 2024-03-29

## 2024-03-29 NOTE — TELEPHONE ENCOUNTER
[facility] CLINICAL PHARMACY REVIEW - BE WELL WITH DIABETES  =============================================  Shai Crenshaw is a 73 y.o. male enrolled in the [facility] Be Well with Diabetes program.      Quarterly check in and compass suzanne update. Sent reminder to pt through [employer].    A1c has improved although still >8%.      BRIANNA Rowland, PharmD, BCACP  Ambulatory Care Clinical Pharmacist- [facility] Clinical Pharmacy  Department, toll free: [phone]    For Pharmacy Admin Tracking Only    Program: [employer]  Time Spent (min): 10

## 2024-04-11 DIAGNOSIS — E11.8 POORLY CONTROLLED TYPE 2 DIABETES MELLITUS WITH COMPLICATION (HCC): ICD-10-CM

## 2024-04-11 DIAGNOSIS — E11.65 POORLY CONTROLLED TYPE 2 DIABETES MELLITUS WITH COMPLICATION (HCC): ICD-10-CM

## 2024-04-11 DIAGNOSIS — E78.2 MIXED HYPERLIPIDEMIA: ICD-10-CM

## 2024-04-11 DIAGNOSIS — E11.9 DIABETES MELLITUS WITHOUT COMPLICATION (HCC): ICD-10-CM

## 2024-04-11 DIAGNOSIS — I10 BENIGN ESSENTIAL HTN: ICD-10-CM

## 2024-04-12 RX ORDER — OMEPRAZOLE 40 MG/1
CAPSULE, DELAYED RELEASE ORAL
Qty: 90 CAPSULE | Refills: 1 | Status: SHIPPED | OUTPATIENT
Start: 2024-04-12

## 2024-04-12 RX ORDER — PEN NEEDLE, DIABETIC 31 GX5/16"
NEEDLE, DISPOSABLE MISCELLANEOUS
Qty: 400 EACH | Refills: 5 | Status: SHIPPED | OUTPATIENT
Start: 2024-04-12

## 2024-04-12 RX ORDER — EMPAGLIFLOZIN, METFORMIN HYDROCHLORIDE 25; 1000 MG/1; MG/1
1 TABLET, EXTENDED RELEASE ORAL DAILY
Qty: 90 TABLET | Refills: 1 | Status: SHIPPED | OUTPATIENT
Start: 2024-04-12

## 2024-04-12 RX ORDER — SYRINGE-NEEDLE,INSULIN,0.5 ML 27GX1/2"
SYRINGE, EMPTY DISPOSABLE MISCELLANEOUS
Qty: 100 EACH | Refills: 3 | Status: SHIPPED | OUTPATIENT
Start: 2024-04-12

## 2024-04-12 RX ORDER — AMLODIPINE BESYLATE 5 MG/1
TABLET ORAL
Qty: 90 TABLET | Refills: 1 | Status: SHIPPED | OUTPATIENT
Start: 2024-04-12

## 2024-04-12 RX ORDER — ATORVASTATIN CALCIUM 20 MG/1
TABLET, FILM COATED ORAL
Qty: 90 TABLET | Refills: 1 | Status: SHIPPED | OUTPATIENT
Start: 2024-04-12

## 2024-04-12 RX ORDER — LOSARTAN POTASSIUM 100 MG/1
TABLET ORAL
Qty: 90 TABLET | Refills: 1 | Status: SHIPPED | OUTPATIENT
Start: 2024-04-12

## 2024-04-12 RX ORDER — INSULIN GLARGINE 100 [IU]/ML
INJECTION, SOLUTION SUBCUTANEOUS
Qty: 40 ML | Refills: 3 | Status: SHIPPED | OUTPATIENT
Start: 2024-04-12

## 2024-04-18 LAB
CHOLEST SERPL-MCNC: 142 MG/DL (ref 0–199)
GLUCOSE SERPL-MCNC: 219 MG/DL (ref 70–99)
HDLC SERPL-MCNC: 40 MG/DL (ref 40–60)
LDLC SERPL CALC-MCNC: 74 MG/DL
TRIGL SERPL-MCNC: 138 MG/DL (ref 0–150)

## 2024-04-19 LAB
EST. AVERAGE GLUCOSE BLD GHB EST-MCNC: 188.6 MG/DL
HBA1C MFR BLD: 8.2 %

## 2024-06-05 NOTE — TELEPHONE ENCOUNTER
Called patient to schedule pharmacist appointment to discuss medications for Diabetes Management Program.    No answer. Left VM on home/cell TAD: Please call back at [phone] Option #7 to retrieve the above message.
Called patient to schedule yearly pharmacist appointment to discuss medications for Diabetes Management Program.     Spoke to patient and appointment scheduled for 1/28/21 at 2:30pm.       Nicole Lange, [address]   Department, toll free: [phone], option 7
[de-identified] : I injected the patient's left knee today with cortisone for primary osteoarthritis.  I discussed at length with the patient the planned steroid and lidocaine injection. The risks, benefits, convalescence and alternatives were reviewed. The possible side effects discussed included but were not limited to: pain, swelling, heat, bleeding, and redness. Symptoms are generally mild but if they are extensive then contact the office. Giving pain relievers by mouth such as NSAIDs or Tylenol can generally treat the reactions to steroid and lidocaine. Rare cases of infection have been noted. Rash, hives and itching may occur post injection. If you have muscle pain or cramps, flushing and or swelling of the face, rapid heart beat, nausea, dizziness, fever, chills, headache, difficulty breathing, swelling in the arms or legs, or have a prickly feeling of your skin, contact a health care provider immediately. Following this discussion, the knee was prepped with Alcohol and under sterile condition the 80 mg Depo-Medrol and 6 cc Lidocaine injection was performed with a 20 gauge needle through a superolateral injection site. The needle was introduced into the joint, aspiration was performed to ensure intra-articular placement and the medication was injected. Upon withdrawal of the needle the site was cleaned with alcohol and a band aid applied. The patient tolerated the injection well and there were no adverse effects. Post injection instructions included no strenuous activity for 24 hours, cryotherapy and if there are any adverse effects to contact the office.

## 2024-07-02 ENCOUNTER — PATIENT MESSAGE (OUTPATIENT)
Dept: ENDOCRINOLOGY | Age: 74
End: 2024-07-02

## 2024-07-02 DIAGNOSIS — E11.9 DIABETES MELLITUS WITHOUT COMPLICATION (HCC): Primary | ICD-10-CM

## 2024-07-02 DIAGNOSIS — Z79.4 TYPE 2 DIABETES MELLITUS WITH OTHER SKIN COMPLICATION, WITH LONG-TERM CURRENT USE OF INSULIN (HCC): ICD-10-CM

## 2024-07-02 DIAGNOSIS — E11.628 TYPE 2 DIABETES MELLITUS WITH OTHER SKIN COMPLICATION, WITH LONG-TERM CURRENT USE OF INSULIN (HCC): ICD-10-CM

## 2024-07-03 NOTE — TELEPHONE ENCOUNTER
From: Shai Crenshaw  To: Dr. Mel Perdomo  Sent: 7/2/2024 6:07 PM EDT  Subject: Ozempic    I only have four shots left of ozempic   1 MG/dose I didn’t know for sure what you were going to do next. I am doing pretty good on this dose so far   But I have eye surgery 8/15 so I would like to stay   On 1 MG/dose for now till my surgery is over  I’m gonna see you right after surgery so then we can talk about future doses.   I will have to have refill before surgery. I will send request thru pharmacy. I just didn’t want to change anything until after surgery if that’s ok.  I am down to 186 lbs now and doing fine

## 2024-07-19 ENCOUNTER — PATIENT MESSAGE (OUTPATIENT)
Dept: PHARMACY | Facility: CLINIC | Age: 74
End: 2024-07-19

## 2024-07-19 ENCOUNTER — CLINICAL DOCUMENTATION (OUTPATIENT)
Dept: PHARMACY | Facility: CLINIC | Age: 74
End: 2024-07-19

## 2024-07-19 NOTE — PROGRESS NOTES
2nd Quarterly Reminder sent to patient for the DM Program - See Mychart message or Letter for more information.    Shannon Shetty CphT  [facility]  Clinical Pharmacy   Department, toll free: [phone] Option #3    For Pharmacy Admin Tracking Only    Program: [employer]  CPA in place:  No  Gap Closed?: Yes   Time Spent (min): 5

## 2024-07-24 ENCOUNTER — CARE COORDINATION (OUTPATIENT)
Dept: OTHER | Facility: CLINIC | Age: 74
End: 2024-07-24

## 2024-07-24 NOTE — CARE COORDINATION
Ambulatory Care Coordination Note     7/24/2024 11:52 AM     Patient outreach attempt by this ACM today to offer care management services. ACM was unable to reach the patient by telephone today; left voice message requesting a return phone call to this ACM.     ACM: Charisse Pichardo RN     Care Summary Note: Unable to reach patient     PCP/Specialist follow up:   Future Appointments         Provider Specialty Dept Phone    7/29/2024 11:00 AM Jen Arambula MD Family Medicine [phone]    8/19/2024 2:40 PM Mel Perdomo MD Endocrinology [phone]            Follow Up:   Plan for next ACM outreach in approximately 1 week to complete:  - outreach attempt to offer care management services.

## 2024-07-29 ENCOUNTER — OFFICE VISIT (OUTPATIENT)
Dept: FAMILY MEDICINE CLINIC | Age: 74
End: 2024-07-29
Payer: MEDICARE

## 2024-07-29 VITALS
HEART RATE: 84 BPM | SYSTOLIC BLOOD PRESSURE: 122 MMHG | WEIGHT: 188 LBS | BODY MASS INDEX: 31.32 KG/M2 | OXYGEN SATURATION: 98 % | DIASTOLIC BLOOD PRESSURE: 80 MMHG | HEIGHT: 65 IN

## 2024-07-29 DIAGNOSIS — Z79.4 TYPE 2 DIABETES MELLITUS WITH OTHER SKIN COMPLICATION, WITH LONG-TERM CURRENT USE OF INSULIN (HCC): ICD-10-CM

## 2024-07-29 DIAGNOSIS — H26.9 CATARACT OF LEFT EYE, UNSPECIFIED CATARACT TYPE: ICD-10-CM

## 2024-07-29 DIAGNOSIS — Z12.5 PROSTATE CANCER SCREENING: ICD-10-CM

## 2024-07-29 DIAGNOSIS — I10 BENIGN ESSENTIAL HTN: ICD-10-CM

## 2024-07-29 DIAGNOSIS — Z89.422 ACQUIRED ABSENCE OF OTHER LEFT TOE(S) (HCC): ICD-10-CM

## 2024-07-29 DIAGNOSIS — F32.1 MODERATE SINGLE CURRENT EPISODE OF MAJOR DEPRESSIVE DISORDER (HCC): ICD-10-CM

## 2024-07-29 DIAGNOSIS — E11.69 DYSLIPIDEMIA ASSOCIATED WITH TYPE 2 DIABETES MELLITUS (HCC): ICD-10-CM

## 2024-07-29 DIAGNOSIS — E11.628 TYPE 2 DIABETES MELLITUS WITH OTHER SKIN COMPLICATION, WITH LONG-TERM CURRENT USE OF INSULIN (HCC): ICD-10-CM

## 2024-07-29 DIAGNOSIS — Z01.818 PREOP EXAMINATION: Primary | ICD-10-CM

## 2024-07-29 DIAGNOSIS — E78.5 DYSLIPIDEMIA ASSOCIATED WITH TYPE 2 DIABETES MELLITUS (HCC): ICD-10-CM

## 2024-07-29 LAB — HBA1C MFR BLD: 8.2 %

## 2024-07-29 PROCEDURE — 3074F SYST BP LT 130 MM HG: CPT | Performed by: FAMILY MEDICINE

## 2024-07-29 PROCEDURE — 2022F DILAT RTA XM EVC RTNOPTHY: CPT | Performed by: FAMILY MEDICINE

## 2024-07-29 PROCEDURE — G2211 COMPLEX E/M VISIT ADD ON: HCPCS | Performed by: FAMILY MEDICINE

## 2024-07-29 PROCEDURE — 3052F HG A1C>EQUAL 8.0%<EQUAL 9.0%: CPT | Performed by: FAMILY MEDICINE

## 2024-07-29 PROCEDURE — G8427 DOCREV CUR MEDS BY ELIG CLIN: HCPCS | Performed by: FAMILY MEDICINE

## 2024-07-29 PROCEDURE — 99214 OFFICE O/P EST MOD 30 MIN: CPT | Performed by: FAMILY MEDICINE

## 2024-07-29 PROCEDURE — G8417 CALC BMI ABV UP PARAM F/U: HCPCS | Performed by: FAMILY MEDICINE

## 2024-07-29 PROCEDURE — 1036F TOBACCO NON-USER: CPT | Performed by: FAMILY MEDICINE

## 2024-07-29 PROCEDURE — 83036 HEMOGLOBIN GLYCOSYLATED A1C: CPT | Performed by: FAMILY MEDICINE

## 2024-07-29 PROCEDURE — 3079F DIAST BP 80-89 MM HG: CPT | Performed by: FAMILY MEDICINE

## 2024-07-29 PROCEDURE — 3017F COLORECTAL CA SCREEN DOC REV: CPT | Performed by: FAMILY MEDICINE

## 2024-07-29 PROCEDURE — 1123F ACP DISCUSS/DSCN MKR DOCD: CPT | Performed by: FAMILY MEDICINE

## 2024-07-29 SDOH — ECONOMIC STABILITY: FOOD INSECURITY: WITHIN THE PAST 12 MONTHS, YOU WORRIED THAT YOUR FOOD WOULD RUN OUT BEFORE YOU GOT MONEY TO BUY MORE.: NEVER TRUE

## 2024-07-29 SDOH — ECONOMIC STABILITY: FOOD INSECURITY: WITHIN THE PAST 12 MONTHS, THE FOOD YOU BOUGHT JUST DIDN'T LAST AND YOU DIDN'T HAVE MONEY TO GET MORE.: NEVER TRUE

## 2024-07-29 SDOH — ECONOMIC STABILITY: HOUSING INSECURITY
IN THE LAST 12 MONTHS, WAS THERE A TIME WHEN YOU DID NOT HAVE A STEADY PLACE TO SLEEP OR SLEPT IN A SHELTER (INCLUDING NOW)?: NO

## 2024-07-29 SDOH — ECONOMIC STABILITY: INCOME INSECURITY: HOW HARD IS IT FOR YOU TO PAY FOR THE VERY BASICS LIKE FOOD, HOUSING, MEDICAL CARE, AND HEATING?: NOT HARD AT ALL

## 2024-07-29 ASSESSMENT — PATIENT HEALTH QUESTIONNAIRE - PHQ9
2. FEELING DOWN, DEPRESSED OR HOPELESS: NOT AT ALL
SUM OF ALL RESPONSES TO PHQ QUESTIONS 1-9: 0
4. FEELING TIRED OR HAVING LITTLE ENERGY: NOT AT ALL
SUM OF ALL RESPONSES TO PHQ QUESTIONS 1-9: 0
3. TROUBLE FALLING OR STAYING ASLEEP: NOT AT ALL
8. MOVING OR SPEAKING SO SLOWLY THAT OTHER PEOPLE COULD HAVE NOTICED. OR THE OPPOSITE, BEING SO FIGETY OR RESTLESS THAT YOU HAVE BEEN MOVING AROUND A LOT MORE THAN USUAL: NOT AT ALL
7. TROUBLE CONCENTRATING ON THINGS, SUCH AS READING THE NEWSPAPER OR WATCHING TELEVISION: NOT AT ALL
9. THOUGHTS THAT YOU WOULD BE BETTER OFF DEAD, OR OF HURTING YOURSELF: NOT AT ALL
SUM OF ALL RESPONSES TO PHQ QUESTIONS 1-9: 0
6. FEELING BAD ABOUT YOURSELF - OR THAT YOU ARE A FAILURE OR HAVE LET YOURSELF OR YOUR FAMILY DOWN: NOT AT ALL
SUM OF ALL RESPONSES TO PHQ9 QUESTIONS 1 & 2: 0
5. POOR APPETITE OR OVEREATING: NOT AT ALL
1. LITTLE INTEREST OR PLEASURE IN DOING THINGS: NOT AT ALL
SUM OF ALL RESPONSES TO PHQ QUESTIONS 1-9: 0
10. IF YOU CHECKED OFF ANY PROBLEMS, HOW DIFFICULT HAVE THESE PROBLEMS MADE IT FOR YOU TO DO YOUR WORK, TAKE CARE OF THINGS AT HOME, OR GET ALONG WITH OTHER PEOPLE: NOT DIFFICULT AT ALL

## 2024-07-29 NOTE — PROGRESS NOTES
[facility] Medicine Preoperative Evaluation       Jen Arambula MD                                       [address]     [address]     [place] [address]     [phone] Phone     [phone] Fax    Dear Dr. Davis,     Thank you for referring Shai Crenshaw to me for Preoperative Evaluation. Below are the relevant portions of my assessment and plan of care.    Shai Crenshaw    74 y.o.   1950    [address] [address]    Vitals:    07/29/24 1059   BP: 122/80   Pulse: 84   SpO2: 98%   Weight: 85.3 kg (188 lb)   Height: 1.651 m (5' 5\")      Wt Readings from Last 2 Encounters:   07/29/24 85.3 kg (188 lb)   03/04/24 93 kg (205 lb)     BP Readings from Last 3 Encounters:   07/29/24 122/80   03/04/24 108/68   10/16/23 (!) 162/72        No Known Allergies  Outpatient Medications Marked as Taking for the 7/29/24 encounter (Office Visit) with Jen Arambula MD   Medication Sig Dispense Refill    Semaglutide, 1 MG/DOSE, (OZEMPIC) 4 MG/3ML SOPN sc injection Take 1 mg weekly dose 3 mL 3    SYNJARDY XR  MG TB24 TAKE ONE TABLET BY MOUTH ONCE A DAY 90 tablet 1    omeprazole (PRILOSEC) 40 MG delayed release capsule TAKE ONE CAPSULE BY MOUTH ONCE A DAY 90 capsule 1    losartan (COZAAR) 100 MG tablet TAKE ONE TABLET BY MOUTH ONCE A DAY 90 tablet 1    atorvastatin (LIPITOR) 20 MG tablet TAKE ONE TABLET BY MOUTH ONCE A DAY 90 tablet 1    Insulin Pen Needle (TRUEPLUS PEN NEEDLES) 31G X 8 MM MISC USE TO INJECT INSULIN 4 TIMES A  each 5    Insulin Syringe-Needle U-100 (TRUEPLUS INSULIN SYRINGE) 31G X 5/16\" 1 ML MISC USE DAILY 100 each 3    insulin glargine (LANTUS) 100 UNIT/ML injection vial INJECT 34 UNITS UNDER THE SKIN ONCE DAILY (Patient taking differently: Inject 20 Units into the skin every morning) 40 mL 3    amLODIPine (NORVASC) 5 MG tablet TAKE ONE TABLET BY MOUTH ONCE A DAY 90 tablet 1    Continuous Blood Gluc  (DEXCOM G7 ) DORA

## 2024-08-01 ENCOUNTER — CARE COORDINATION (OUTPATIENT)
Dept: OTHER | Facility: CLINIC | Age: 74
End: 2024-08-01

## 2024-08-01 NOTE — CARE COORDINATION
of Hyperglycemia, taught by Charisse Picahrdo, RN at 8/1/2024 10:00 AM.  Learner: Patient  Readiness: Acceptance  Method: Explanation, Handout  Response: Verbalizes Understanding    HgbA1c, taught by Charisse Pichardo, RN at 8/1/2024 10:00 AM.  Learner: Patient  Readiness: Acceptance  Method: Explanation, Handout  Response: Verbalizes Understanding    Education Comments  No comments found.     ,    Goals Addressed                      This Visit's Progress      Patient Stated (pt-stated)         A1C less than 7.8%    Barriers: learning how to eat  Plan for overcoming my barriers: Work with ACM  Confidence: /10  Anticipated Goal Completion Date: 10/24/24    8/1/24 - Most recent A1C 8.2% on 4/18/24.               PCP/Specialist follow up:   Future Appointments         Provider Specialty Dept Phone    8/19/2024 2:40 PM Mel Perdomo MD Endocrinology [phone]    10/28/2024 12:15 PM Jen Arambula MD Family Medicine [phone]            Follow Up:   Plan for next ACM outreach in approximately 3 weeks to complete:  - CC Protocol assessments  - disease specific assessments  - SDOH assessments  - advance care planning   - goal progression  - education .   Sliding scale  Patient  is agreeable to this plan.

## 2024-08-16 DIAGNOSIS — E11.69 DYSLIPIDEMIA ASSOCIATED WITH TYPE 2 DIABETES MELLITUS (HCC): ICD-10-CM

## 2024-08-16 DIAGNOSIS — Z12.5 PROSTATE CANCER SCREENING: ICD-10-CM

## 2024-08-16 DIAGNOSIS — Z79.4 TYPE 2 DIABETES MELLITUS WITH OTHER SKIN COMPLICATION, WITH LONG-TERM CURRENT USE OF INSULIN (HCC): ICD-10-CM

## 2024-08-16 DIAGNOSIS — E11.628 TYPE 2 DIABETES MELLITUS WITH OTHER SKIN COMPLICATION, WITH LONG-TERM CURRENT USE OF INSULIN (HCC): ICD-10-CM

## 2024-08-16 DIAGNOSIS — E78.5 DYSLIPIDEMIA ASSOCIATED WITH TYPE 2 DIABETES MELLITUS (HCC): ICD-10-CM

## 2024-08-16 DIAGNOSIS — I10 BENIGN ESSENTIAL HTN: ICD-10-CM

## 2024-08-17 LAB
ALBUMIN SERPL-MCNC: 4 G/DL (ref 3.4–5)
ALBUMIN/GLOB SERPL: 2 {RATIO} (ref 1.1–2.2)
ALP SERPL-CCNC: 79 U/L (ref 40–129)
ALT SERPL-CCNC: 12 U/L (ref 10–40)
ANION GAP SERPL CALCULATED.3IONS-SCNC: 10 MMOL/L (ref 3–16)
AST SERPL-CCNC: 16 U/L (ref 15–37)
BASOPHILS # BLD: 0.1 K/UL (ref 0–0.2)
BASOPHILS NFR BLD: 0.9 %
BILIRUB SERPL-MCNC: <0.2 MG/DL (ref 0–1)
BUN SERPL-MCNC: 14 MG/DL (ref 7–20)
CALCIUM SERPL-MCNC: 9.2 MG/DL (ref 8.3–10.6)
CHLORIDE SERPL-SCNC: 105 MMOL/L (ref 99–110)
CHOLEST SERPL-MCNC: 156 MG/DL (ref 0–199)
CO2 SERPL-SCNC: 28 MMOL/L (ref 21–32)
CREAT SERPL-MCNC: 1 MG/DL (ref 0.8–1.3)
DEPRECATED RDW RBC AUTO: 14 % (ref 12.4–15.4)
EOSINOPHIL # BLD: 0.1 K/UL (ref 0–0.6)
EOSINOPHIL NFR BLD: 1 %
EST. AVERAGE GLUCOSE BLD GHB EST-MCNC: 159.9 MG/DL
GFR SERPLBLD CREATININE-BSD FMLA CKD-EPI: 78 ML/MIN/{1.73_M2}
GLUCOSE SERPL-MCNC: 176 MG/DL (ref 70–99)
HBA1C MFR BLD: 7.2 %
HCT VFR BLD AUTO: 43.8 % (ref 40.5–52.5)
HDLC SERPL-MCNC: 40 MG/DL (ref 40–60)
HGB BLD-MCNC: 14.1 G/DL (ref 13.5–17.5)
LDLC SERPL CALC-MCNC: 89 MG/DL
LYMPHOCYTES # BLD: 1.5 K/UL (ref 1–5.1)
LYMPHOCYTES NFR BLD: 20.1 %
MCH RBC QN AUTO: 31.8 PG (ref 26–34)
MCHC RBC AUTO-ENTMCNC: 32.1 G/DL (ref 31–36)
MCV RBC AUTO: 99.1 FL (ref 80–100)
MONOCYTES # BLD: 0.6 K/UL (ref 0–1.3)
MONOCYTES NFR BLD: 8.5 %
NEUTROPHILS # BLD: 5.2 K/UL (ref 1.7–7.7)
NEUTROPHILS NFR BLD: 69.5 %
PLATELET # BLD AUTO: 246 K/UL (ref 135–450)
PMV BLD AUTO: 9.7 FL (ref 5–10.5)
POTASSIUM SERPL-SCNC: 4.5 MMOL/L (ref 3.5–5.1)
PROT SERPL-MCNC: 6 G/DL (ref 6.4–8.2)
PSA SERPL DL<=0.01 NG/ML-MCNC: 3.59 NG/ML (ref 0–4)
RBC # BLD AUTO: 4.42 M/UL (ref 4.2–5.9)
SODIUM SERPL-SCNC: 143 MMOL/L (ref 136–145)
TRIGL SERPL-MCNC: 134 MG/DL (ref 0–150)
VLDLC SERPL CALC-MCNC: 27 MG/DL
WBC # BLD AUTO: 7.5 K/UL (ref 4–11)

## 2024-08-19 ENCOUNTER — OFFICE VISIT (OUTPATIENT)
Dept: ENDOCRINOLOGY | Age: 74
End: 2024-08-19
Payer: MEDICARE

## 2024-08-19 VITALS
HEART RATE: 82 BPM | HEIGHT: 65 IN | RESPIRATION RATE: 16 BRPM | DIASTOLIC BLOOD PRESSURE: 53 MMHG | SYSTOLIC BLOOD PRESSURE: 105 MMHG | WEIGHT: 187 LBS | BODY MASS INDEX: 31.16 KG/M2

## 2024-08-19 DIAGNOSIS — I10 BENIGN ESSENTIAL HTN: ICD-10-CM

## 2024-08-19 DIAGNOSIS — E11.628 TYPE 2 DIABETES MELLITUS WITH OTHER SKIN COMPLICATION, WITH LONG-TERM CURRENT USE OF INSULIN (HCC): ICD-10-CM

## 2024-08-19 DIAGNOSIS — E78.2 MIXED HYPERLIPIDEMIA: ICD-10-CM

## 2024-08-19 DIAGNOSIS — E66.01 SEVERE OBESITY (BMI 35.0-39.9) WITH COMORBIDITY (HCC): ICD-10-CM

## 2024-08-19 DIAGNOSIS — Z79.4 TYPE 2 DIABETES MELLITUS WITH OTHER SKIN COMPLICATION, WITH LONG-TERM CURRENT USE OF INSULIN (HCC): ICD-10-CM

## 2024-08-19 DIAGNOSIS — K21.9 GASTROESOPHAGEAL REFLUX DISEASE WITHOUT ESOPHAGITIS: ICD-10-CM

## 2024-08-19 DIAGNOSIS — Z79.4 TYPE 2 DIABETES MELLITUS WITH OTHER SKIN COMPLICATION, WITH LONG-TERM CURRENT USE OF INSULIN (HCC): Primary | ICD-10-CM

## 2024-08-19 DIAGNOSIS — E11.628 TYPE 2 DIABETES MELLITUS WITH OTHER SKIN COMPLICATION, WITH LONG-TERM CURRENT USE OF INSULIN (HCC): Primary | ICD-10-CM

## 2024-08-19 LAB
CREAT UR-MCNC: 86.4 MG/DL (ref 39–259)
MICROALBUMIN UR DL<=1MG/L-MCNC: 5.49 MG/DL
MICROALBUMIN/CREAT UR: 63.5 MG/G (ref 0–30)

## 2024-08-19 PROCEDURE — 2022F DILAT RTA XM EVC RTNOPTHY: CPT | Performed by: INTERNAL MEDICINE

## 2024-08-19 PROCEDURE — 3074F SYST BP LT 130 MM HG: CPT | Performed by: INTERNAL MEDICINE

## 2024-08-19 PROCEDURE — 3017F COLORECTAL CA SCREEN DOC REV: CPT | Performed by: INTERNAL MEDICINE

## 2024-08-19 PROCEDURE — 1123F ACP DISCUSS/DSCN MKR DOCD: CPT | Performed by: INTERNAL MEDICINE

## 2024-08-19 PROCEDURE — 99214 OFFICE O/P EST MOD 30 MIN: CPT | Performed by: INTERNAL MEDICINE

## 2024-08-19 PROCEDURE — 95251 CONT GLUC MNTR ANALYSIS I&R: CPT | Performed by: INTERNAL MEDICINE

## 2024-08-19 PROCEDURE — 3051F HG A1C>EQUAL 7.0%<8.0%: CPT | Performed by: INTERNAL MEDICINE

## 2024-08-19 PROCEDURE — 1036F TOBACCO NON-USER: CPT | Performed by: INTERNAL MEDICINE

## 2024-08-19 PROCEDURE — G8427 DOCREV CUR MEDS BY ELIG CLIN: HCPCS | Performed by: INTERNAL MEDICINE

## 2024-08-19 PROCEDURE — G8417 CALC BMI ABV UP PARAM F/U: HCPCS | Performed by: INTERNAL MEDICINE

## 2024-08-19 PROCEDURE — 3078F DIAST BP <80 MM HG: CPT | Performed by: INTERNAL MEDICINE

## 2024-08-19 RX ORDER — SEMAGLUTIDE 2.68 MG/ML
2 INJECTION, SOLUTION SUBCUTANEOUS
Qty: 3 ML | Refills: 3 | Status: SHIPPED | OUTPATIENT
Start: 2024-08-19

## 2024-08-19 NOTE — PATIENT INSTRUCTIONS
Patient Education        Hypoglycemia: Care Instructions  Overview  Hypoglycemia means that your blood sugar is low and your body isn’t getting enough fuel. Low blood sugar can be caused by too much insulin or certain medicines. Some people get low blood sugar from missing a meal or exercising too hard without eating enough food.    Know your signs of low blood sugar. They're different for everyone. Common early signs include nausea; hunger; and feeling nervous, irritable, or shaky.    It can help to check your blood sugar levels often. Take your insulin or other medicine as prescribed.  How can you care for yourself?    Use the \"rule of 15\" to treat low blood sugar.  Eat 15 grams of carbohydrate from a quick-sugar food (such as 3 or 4 glucose tablets or 1/2 cup of juice). Wait 15 minutes and check your blood sugar. Repeat if your blood sugar is still below 70 mg/dL.    Eat after your blood sugar is in a safe range.  A snack or meal can reduce symptoms and prevent low blood sugar from coming back.    Tell friends, family, and coworkers how they can help you.  Make sure that they know the symptoms of low blood sugar and how to help you get your sugar levels up.    If you have glucagon, keep it with you.  Make sure that your friends, family, and coworkers know how to use it.  When should you call for help?    Call anytime you think you may need emergency care. For example, call if:  You passed out (lost consciousness).  You are confused or cannot think clearly.  Your blood sugar is very high or very low.  Watch closely for changes in your health, and be sure to contact your doctor if:  Your blood sugar stays outside the level your doctor set for you.  You have any problems.  Where can you learn more?  Go to https://www.[employer].net/patientEd and enter R955 to learn more about \"Hypoglycemia: Care Instructions.\"  Current as of: October 2, 2023  Content Version: 14.1  © 8275-1701 [employer]wise[employer].   Care

## 2024-08-19 NOTE — PROGRESS NOTES
ends up correcting glucose way above 150 just to avoid hypoglycemia.  On review of his continuous glucose data there was 0 documented hypoglycemia most glucose values were above 150 ranging up to 350.    INTERIM  Started Ozempic in Nov 2023 --he has lost 25 lbs   Currently taking 0.5 mg weekly   Some constipation     No Known Allergies  Outpatient Medications Marked as Taking for the 8/19/24 encounter (Office Visit) with Mel Perdomo MD   Medication Sig Dispense Refill    Semaglutide, 1 MG/DOSE, (OZEMPIC) 4 MG/3ML SOPN sc injection Take 1 mg weekly dose 3 mL 3    SYNJARDY XR  MG TB24 TAKE ONE TABLET BY MOUTH ONCE A DAY 90 tablet 1    omeprazole (PRILOSEC) 40 MG delayed release capsule TAKE ONE CAPSULE BY MOUTH ONCE A DAY 90 capsule 1    losartan (COZAAR) 100 MG tablet TAKE ONE TABLET BY MOUTH ONCE A DAY 90 tablet 1    atorvastatin (LIPITOR) 20 MG tablet TAKE ONE TABLET BY MOUTH ONCE A DAY 90 tablet 1    Insulin Pen Needle (TRUEPLUS PEN NEEDLES) 31G X 8 MM MISC USE TO INJECT INSULIN 4 TIMES A  each 5    Insulin Syringe-Needle U-100 (TRUEPLUS INSULIN SYRINGE) 31G X 5/16\" 1 ML MISC USE DAILY 100 each 3    insulin glargine (LANTUS) 100 UNIT/ML injection vial INJECT 34 UNITS UNDER THE SKIN ONCE DAILY (Patient taking differently: Inject 20 Units into the skin every morning) 40 mL 3    amLODIPine (NORVASC) 5 MG tablet TAKE ONE TABLET BY MOUTH ONCE A DAY 90 tablet 1    Continuous Blood Gluc  (DEXCOM G7 ) DORA Use to check Blood Glucose.  Change sensors every 10 days 1 each 0    Continuous Blood Gluc Sensor (DEXCOM G7 SENSOR) MISC Use to check Blood Glucose.  Change sensors every 10 days 9 each 3    insulin lispro, 1 Unit Dial, (HUMALOG KWIKPEN) 100 UNIT/ML SOPN INJECT 10 UNITS UNDER THE SKIN THREE TIMES A DAY BEFORE MEALS 30 mL 3    AGAMATRIX PRESTO TEST strip TEST BLOOD SUGAR 8 TIMES A  strip 5    albuterol sulfate  (90 Base) MCG/ACT inhaler 2 puffs every 6 hours as needed for

## 2024-08-20 ENCOUNTER — CARE COORDINATION (OUTPATIENT)
Dept: OTHER | Facility: CLINIC | Age: 74
End: 2024-08-20

## 2024-08-20 NOTE — CARE COORDINATION
Ambulatory Care Coordination Note     8/20/2024 12:01 PM     Patient outreach attempt by this ACM today to perform care management follow up . ACM was unable to reach the patient by telephone today; left voice message requesting a return phone call to this ACM.     ACM: Charisse Pichardo RN     Care Summary Note: Unable to reach patient     PCP/Specialist follow up:   Future Appointments         Provider Specialty Dept Phone    10/28/2024 12:15 PM Jen Arambula MD Family Medicine [phone]    1/13/2025 1:40 PM Mel Perdomo MD Endocrinology [phone]            Follow Up:   Plan for next ACM outreach in approximately 3 weeks to complete:  - CC Protocol assessments  - disease specific assessments  - SDOH assessments  - medication review  - goal progression  - education   - follow-up appointment with Endocrinology .

## 2024-09-12 ENCOUNTER — CARE COORDINATION (OUTPATIENT)
Dept: OTHER | Facility: CLINIC | Age: 74
End: 2024-09-12

## 2024-10-07 ENCOUNTER — TELEPHONE (OUTPATIENT)
Dept: ENDOCRINOLOGY | Age: 74
End: 2024-10-07

## 2024-10-07 NOTE — TELEPHONE ENCOUNTER
The request has been approved. The authorization is effective from 10/07/2024 to 10/06/2025, as long as the member is enrolled in their current health plan.. Authorization Expiration Date: October 6, 2025.     If this requires a response please respond to the pool ( P MHCX PSC MEDICATION PRE-AUTH).      Thank you please advise patient.

## 2024-10-07 NOTE — TELEPHONE ENCOUNTER
Submitted PA for Ozempic  Via [facility] Key: [id number] STATUS: PENDING.    Follow up done daily; if no decision with in three days we will refax.  If another three days goes by with no decision will call the insurance for status.

## 2024-10-14 ENCOUNTER — CARE COORDINATION (OUTPATIENT)
Dept: OTHER | Facility: CLINIC | Age: 74
End: 2024-10-14

## 2024-10-14 NOTE — CARE COORDINATION
Ambulatory Care Coordination Note     10/14/2024 9:50 AM     Patient outreach attempt by this ACM today to perform care management follow up . ACM was unable to reach the patient by telephone today; left voice message requesting a return phone call to this ACM.     ACM: Charisse Pichardo RN     Care Summary Note: Unable to reach patient     PCP/Specialist follow up:   Future Appointments         Provider Specialty Dept Phone    1/13/2025 1:40 PM Mel Perdomo MD Endocrinology [phone]            Follow Up:   Plan for next AC outreach in approximately 1 month to complete:  - disease specific assessments  - SDOH assessments  - medication review  - advance care planning  - goal progression  - education .

## 2024-10-17 ENCOUNTER — PATIENT MESSAGE (OUTPATIENT)
Dept: PHARMACY | Facility: CLINIC | Age: 74
End: 2024-10-17

## 2024-10-17 DIAGNOSIS — E78.2 MIXED HYPERLIPIDEMIA: ICD-10-CM

## 2024-10-17 DIAGNOSIS — E11.9 DIABETES MELLITUS WITHOUT COMPLICATION (HCC): ICD-10-CM

## 2024-10-17 DIAGNOSIS — I10 BENIGN ESSENTIAL HTN: ICD-10-CM

## 2024-10-17 RX ORDER — EMPAGLIFLOZIN, METFORMIN HYDROCHLORIDE 25; 1000 MG/1; MG/1
1 TABLET, EXTENDED RELEASE ORAL DAILY
Qty: 90 TABLET | Refills: 0 | Status: SHIPPED | OUTPATIENT
Start: 2024-10-17

## 2024-10-17 RX ORDER — OMEPRAZOLE 40 MG/1
CAPSULE, DELAYED RELEASE ORAL
Qty: 90 CAPSULE | Refills: 0 | Status: SHIPPED | OUTPATIENT
Start: 2024-10-17

## 2024-10-17 RX ORDER — LOSARTAN POTASSIUM 100 MG/1
TABLET ORAL
Qty: 90 TABLET | Refills: 0 | Status: SHIPPED | OUTPATIENT
Start: 2024-10-17

## 2024-10-17 RX ORDER — ATORVASTATIN CALCIUM 20 MG/1
TABLET, FILM COATED ORAL
Qty: 90 TABLET | Refills: 0 | Status: SHIPPED | OUTPATIENT
Start: 2024-10-17

## 2024-10-25 NOTE — TELEPHONE ENCOUNTER
Caity message not read by patient.  Information mailed to patient in a letter.    No further patient outreach planned at this time.    Shannon Shetty CphT   [facility] Clinical   [facility] Clinical Pharmacy  Department, toll free: [phone], option 3

## 2024-11-05 ENCOUNTER — CARE COORDINATION (OUTPATIENT)
Dept: OTHER | Facility: CLINIC | Age: 74
End: 2024-11-05

## 2024-11-05 NOTE — CARE COORDINATION
Ambulatory Care Coordination Note     11/5/2024 2:05 PM     Patient outreach attempt by this ACM today to perform care management follow up . [facility] was unable to reach the patient by telephone today; left voice message requesting a return phone call to this ACM.     ACM: Charisse Pichardo RN     PCP/Specialist follow up:   Future Appointments         Provider Specialty Dept Phone    1/13/2025 1:40 PM Mel Perdomo MD Endocrinology [phone]            Follow Up:   Plan for next AC outreach in approximately 1 month to complete:  - disease specific assessments  - SDOH assessments  - medication review  - advance care planning  - goal progression  - education .

## 2024-12-05 ENCOUNTER — CARE COORDINATION (OUTPATIENT)
Dept: OTHER | Facility: CLINIC | Age: 74
End: 2024-12-05

## 2024-12-05 NOTE — CARE COORDINATION
Ambulatory Care Coordination Note     12/5/2024 10:26 AM     Patient outreached by this ACM today to perform care management follow up .   Patient unable to talk and will return call to this ACM     ACM: Charisse Pichardo RN     PCP/Specialist follow up:   Future Appointments         Provider Specialty Dept Phone    1/13/2025 1:40 PM Mel Perdomo MD Endocrinology [phone]            Follow Up:     This ACM to sign off and close program if return call is not received.

## 2024-12-10 ENCOUNTER — CARE COORDINATION (OUTPATIENT)
Dept: OTHER | Facility: CLINIC | Age: 74
End: 2024-12-10

## 2024-12-10 NOTE — CARE COORDINATION
AM.  Learner: Patient  Readiness: Acceptance  Method: Explanation  Response: Verbalizes Understanding    Education Comments  No comments found.     ,    Goals Addressed                   This Visit's Progress     Wellness Goal   No change     Patient Self-Management Goal for Health Maintenance  Goal: Walk on treadmill 10 minutes 3xweek  Barriers: lack of motivation  Plan for overcoming my barriers: work with ACM  Confidence: /10  Anticipated Goal Completion Date: 12/12/24 extended to 3/10/25    12/10/24 - Patient reports weight of 170 lbs; has not exercised.                 PCP/Specialist follow up:   Future Appointments         Provider Specialty Dept Phone    1/13/2025 1:40 PM Mel Perdomo MD Endocrinology [phone]            Follow Up:   Plan for next ACM outreach in approximately 1 month to complete:  - disease specific assessments  - SDOH assessments  - medication review   - advance care planning   - goal progression  - education .   Patient  is agreeable to this plan.

## 2024-12-19 ENCOUNTER — TELEPHONE (OUTPATIENT)
Dept: ENDOCRINOLOGY | Age: 74
End: 2024-12-19

## 2024-12-19 DIAGNOSIS — Z79.4 TYPE 2 DIABETES MELLITUS WITH OTHER SKIN COMPLICATION, WITH LONG-TERM CURRENT USE OF INSULIN (HCC): ICD-10-CM

## 2024-12-19 DIAGNOSIS — I10 BENIGN ESSENTIAL HTN: ICD-10-CM

## 2024-12-19 DIAGNOSIS — E78.2 MIXED HYPERLIPIDEMIA: ICD-10-CM

## 2024-12-19 DIAGNOSIS — E11.628 TYPE 2 DIABETES MELLITUS WITH OTHER SKIN COMPLICATION, WITH LONG-TERM CURRENT USE OF INSULIN (HCC): ICD-10-CM

## 2024-12-19 RX ORDER — ACYCLOVIR 400 MG/1
TABLET ORAL
Qty: 9 EACH | Refills: 0 | Status: SHIPPED | OUTPATIENT
Start: 2024-12-19

## 2024-12-19 RX ORDER — SEMAGLUTIDE 2.68 MG/ML
INJECTION, SOLUTION SUBCUTANEOUS
Qty: 3 ML | Refills: 2 | Status: SHIPPED | OUTPATIENT
Start: 2024-12-19

## 2024-12-19 NOTE — TELEPHONE ENCOUNTER
Patient called requesting a refill     Rx- semaglutide, 2 MG/DOSE, (OZEMPIC, 2 MG/DOSE,) 8 MG/3ML SOPN sc injection    Continuous Blood Gluc Sensor (DEXCOM G7 SENSOR)    Pharmacy- [facility]-  NOV- 1/13/25    Please advise

## 2025-01-09 ENCOUNTER — CARE COORDINATION (OUTPATIENT)
Dept: OTHER | Facility: CLINIC | Age: 75
End: 2025-01-09

## 2025-01-09 NOTE — CARE COORDINATION
Ambulatory Care Coordination Note     2025 2:39 PM     Patient Current Location:  [place]     ACM contacted the patient by telephone. Verified name and  with patient as identifiers.         ACM: Charisse Pichardo RN     Challenges to be reviewed by the provider   Additional needs identified to be addressed with provider No  none               Method of communication with provider: none.    Utilization: Patient has not had any utilization since our last call.     Care Summary Note: Patient reports blood sugars are increasing again.  Patient reports fbs today 135 mg/dl.  Patient ate an egg and toast with margarine for breakfast.  Patient reports one hour later, bgl 250 mg/dl.  Patient educated on target fasting and two hour post prandial blood sugars.  Patient states did not eat lunch.  Education regarding avoiding skipping meals discussed with patient again.  Patient instructed to eat Glucerna bar or drink Glucerna shake in place of missing a meal.  Patient states has Atkins bars in the home.  Patient encouraged to utilize Glucerna versus Atkins bar for meal supplements.  Patient reports does drink Glucerna shake at night if hungry after dinner.  Patient reports not eating out much at this time due to cold/snow.  Patient reports will have labs obtained tomorrow weather permitting and has follow up with Endocrinology on 25.  Patient reports did eat cookies over holidays.  Encouraged patient to eat concentrated sweets in moderation.  Patient encouraged to count carbs and/or utilize plate method.  Patient denies hypoglycemic events.  Patient aware of s/s of hypoglycemia and interventions.  Patient is administering Lantus 10 units daily and Ozempic 2 mg weekly.  Patient denies side effects from Ozempic.  Patient reports hydrating with a lot of water.    Patient states has not scheduled appt with Podiatry.    Patient denies mental health concerns.    Patient denies any further needs, questions, or concerns at

## 2025-01-14 DIAGNOSIS — I10 BENIGN ESSENTIAL HTN: ICD-10-CM

## 2025-01-14 DIAGNOSIS — E78.2 MIXED HYPERLIPIDEMIA: ICD-10-CM

## 2025-01-14 DIAGNOSIS — E11.9 DIABETES MELLITUS WITHOUT COMPLICATION (HCC): ICD-10-CM

## 2025-01-15 RX ORDER — ATORVASTATIN CALCIUM 20 MG/1
TABLET, FILM COATED ORAL
Qty: 90 TABLET | Refills: 0 | Status: SHIPPED | OUTPATIENT
Start: 2025-01-15

## 2025-01-15 RX ORDER — OMEPRAZOLE 40 MG/1
CAPSULE, DELAYED RELEASE ORAL
Qty: 90 CAPSULE | Refills: 0 | Status: SHIPPED | OUTPATIENT
Start: 2025-01-15

## 2025-01-15 RX ORDER — EMPAGLIFLOZIN, METFORMIN HYDROCHLORIDE 25; 1000 MG/1; MG/1
1 TABLET, EXTENDED RELEASE ORAL DAILY
Qty: 90 TABLET | Refills: 0 | Status: SHIPPED | OUTPATIENT
Start: 2025-01-15

## 2025-01-15 RX ORDER — LOSARTAN POTASSIUM 100 MG/1
TABLET ORAL
Qty: 90 TABLET | Refills: 0 | Status: SHIPPED | OUTPATIENT
Start: 2025-01-15

## 2025-01-22 ENCOUNTER — CARE COORDINATION (OUTPATIENT)
Dept: OTHER | Facility: CLINIC | Age: 75
End: 2025-01-22

## 2025-01-22 NOTE — CARE COORDINATION
Ambulatory Care Coordination Note     1/22/2025 3:26 PM     Patient outreach attempt by this ACM today to perform care management follow up . [facility] was unable to reach the patient by telephone today;   left voice message requesting a return phone call to this ACM.     ACM: Charisse Pichardo RN     PCP/Specialist follow up:   Future Appointments         Provider Specialty Dept Phone    5/5/2025 1:20 PM Mel Perdomo MD Endocrinology [phone]            Follow Up:   Plan for next AC outreach in approximately 3 weeks to complete:  - disease specific assessments  - SDOH assessments  - medication review  - advance care planning  - goal progression  - education .

## 2025-02-06 ENCOUNTER — TELEPHONE (OUTPATIENT)
Dept: PHARMACY | Facility: CLINIC | Age: 75
End: 2025-02-06

## 2025-02-06 NOTE — TELEPHONE ENCOUNTER
**Patient is [facility] **  Called patient to schedule 2025 yearly pharmacist appointment to discuss medications for Diabetes Management Program.    Scheduled patient for 2/11/25 at 2pm    Katy Ctoto CPhT.   [facility] Clinical   [facility] Clinical Pharmacy  Toll free: [phone] Option 3      For Pharmacy Admin Tracking Only    Program: [employer]  CPA in place:  No  Recommendation Provided To: Patient/Caregiver: 1 via Telephone  Intervention Detail: Scheduled Appointment  Intervention Accepted By: Patient/Caregiver: 1  Gap Closed?: Yes   Time Spent (min): 5

## 2025-02-07 NOTE — TELEPHONE ENCOUNTER
Noted.    Shannon Shetty [facility]   Population Health Clinical   Marshal [facility] Clinical Pharmacy  Department, toll free: [phone], option 3

## 2025-02-11 ENCOUNTER — TELEPHONE (OUTPATIENT)
Dept: PHARMACY | Facility: CLINIC | Age: 75
End: 2025-02-11

## 2025-02-11 NOTE — TELEPHONE ENCOUNTER
POPULATION HEALTH CLINICAL PHARMACY REVIEW - Be Well with Diabetes ([facility])    Shai Crenshaw is a 74 y.o. male enrolled in the [employer] Diabetes Program. Patient provided writer with verbal consent to remain in the program for this year. Patient enrolled 2017    Communication preferences (for >8% followup, urgent messages, etc)  Preferred methods: Phone and Mychart  Preferred days/time: anytime - retired    Medications:  Current Outpatient Medications   Medication Instructions    AGAMATRIX PRESTO TEST strip TEST BLOOD SUGAR 8 TIMES A DAY    albuterol sulfate  (90 Base) MCG/ACT inhaler 2 puffs every 6 hours as needed for shortness of breath or wheezing  - no issues breathing  - has not needed in 5 or 6 months    aspirin EC 81 mg, Oral, DAILY    atorvastatin (LIPITOR) 20 MG tablet TAKE ONE TABLET BY MOUTH ONCE A DAY    Cholecalciferol (VITAMIN D3) 1000 UNITS CAPS 1,000 each, Oral, DAILY    Continuous Blood Gluc  (DEXCOM G7 ) DORA Use to check Blood Glucose.  Change sensors every 10 days    Continuous Glucose Sensor (DEXCOM G7 SENSOR) MISC Use to check Blood Glucose.  Change sensors every 10 days    insulin glargine (LANTUS) 100 UNIT/ML injection vial INJECT 34 UNITS UNDER THE SKIN ONCE DAILY  - currently using 8 units daily in the morning  - vial OK, discussed Lantus pen as option    insulin lispro, 1 Unit Dial, (HUMALOG KWIKPEN) 100 UNIT/ML SOPN INJECT 10 UNITS UNDER THE SKIN THREE TIMES A DAY BEFORE MEALS  - has been taking 4 units in the morning    Insulin Pen Needle (TRUEPLUS PEN NEEDLES) 31G X 8 MM MISC USE TO INJECT INSULIN 4 TIMES A DAY    Insulin Syringe-Needle U-100 (TRUEPLUS INSULIN SYRINGE) 31G X 5/16\" 1 ML MISC USE DAILY    losartan (COZAAR) 100 MG tablet TAKE ONE TABLET BY MOUTH ONCE A DAY    omeprazole (PRILOSEC) 40 MG delayed release capsule TAKE ONE CAPSULE BY MOUTH ONCE A DAY    semaglutide, 2 MG/DOSE, (OZEMPIC, 2 MG/DOSE,) 8 MG/3ML SOPN sc injection INJECT

## 2025-02-13 ENCOUNTER — CARE COORDINATION (OUTPATIENT)
Dept: OTHER | Facility: CLINIC | Age: 75
End: 2025-02-13

## 2025-02-13 NOTE — CARE COORDINATION
Ambulatory Care Coordination Note     2025 3:53 PM     Patient Current Location:  Home: [address]  [place] [id number]     ACM contacted the patient by telephone. Verified name and  with patient as identifiers.         ACM: Charisse Pichardo RN     Challenges to be reviewed by the provider   Additional needs identified to be addressed with provider No  none               Method of communication with provider: none.    Utilization: Patient has not had any utilization since our last call.     Care Summary Note: Patient reports next A1C May 2025.  Patient had to cancel appt due to inclement weather.  Order for labs in EMR; however, patient wants to wait to have them obtained until right before appt.  Patient did not request to be placed on providers cancellation list.      Patient reports fbs 140-150 mg/dl, which is also lowest blood sugars.  Patient reports highest blood sugar 240 mg/dl.  Patient encouraged to read labels and count carbs.  Patient reports doesn't eat enough food to count carbs.  Discussed carb content of foods patient does eat.  Patient reports eating a cookie every once in awhile.  Patient reports taking Lantus 8 Units every morning, reminded long acting.  Patient states taking Humalog 4 Units every morning in order to lower daytime blood sugars.  Patient reports instructed to decrease Lantus as increasing Ozempic.  Patient reports currently taking Ozempic 2 mg weekly.  Patient is not eating three meals daily.  Discussed again importance of three meals daily for glucose control.  Patient resistant to education.  Patient encouraged to utilize Glucerna shake or bar as meal replacer.  Patient has begun to use treadmill twice weekly x 20 minutes and will increase.       Patient denies wounds.  Discussed importance of diabetic foot care as well as assessing feet and legs visually daily.  Patient resistant to education.    Patient denies any further needs, questions, or concerns at this

## 2025-03-05 DIAGNOSIS — E11.628 TYPE 2 DIABETES MELLITUS WITH OTHER SKIN COMPLICATION, WITH LONG-TERM CURRENT USE OF INSULIN (HCC): ICD-10-CM

## 2025-03-05 DIAGNOSIS — E78.2 MIXED HYPERLIPIDEMIA: ICD-10-CM

## 2025-03-05 DIAGNOSIS — Z79.4 TYPE 2 DIABETES MELLITUS WITH OTHER SKIN COMPLICATION, WITH LONG-TERM CURRENT USE OF INSULIN (HCC): ICD-10-CM

## 2025-03-05 DIAGNOSIS — I10 BENIGN ESSENTIAL HTN: ICD-10-CM

## 2025-03-06 NOTE — TELEPHONE ENCOUNTER
Medication:   Requested Prescriptions     Pending Prescriptions Disp Refills    OZEMPIC, 2 MG/DOSE, 8 MG/3ML SOPN sc injection [Pharmacy Med Name: OZEMPIC (2MG/DOSE) 8MG/3ML PENS] 3 mL 2     Sig: INJECT 2MG UNDER THE SKIN ONCE WEEKLY       Last Filled:      Patient Phone Number: [phone] (home)     Last appt: 8/19/2024   Next appt: 5/5/2025    Last Labs DM:   Lab Results   Component Value Date/Time    LABA1C 7.2 08/16/2024 03:11 PM

## 2025-03-10 RX ORDER — SEMAGLUTIDE 2.68 MG/ML
INJECTION, SOLUTION SUBCUTANEOUS
Qty: 3 ML | Refills: 1 | Status: SHIPPED | OUTPATIENT
Start: 2025-03-10 | End: 2025-03-13 | Stop reason: SDUPTHER

## 2025-03-12 ENCOUNTER — PATIENT MESSAGE (OUTPATIENT)
Dept: ENDOCRINOLOGY | Age: 75
End: 2025-03-12

## 2025-03-12 DIAGNOSIS — Z79.4 TYPE 2 DIABETES MELLITUS WITH OTHER SKIN COMPLICATION, WITH LONG-TERM CURRENT USE OF INSULIN (HCC): ICD-10-CM

## 2025-03-12 DIAGNOSIS — E11.628 TYPE 2 DIABETES MELLITUS WITH OTHER SKIN COMPLICATION, WITH LONG-TERM CURRENT USE OF INSULIN (HCC): ICD-10-CM

## 2025-03-12 RX ORDER — ACYCLOVIR 400 MG/1
TABLET ORAL
Qty: 9 EACH | Refills: 0 | Status: SHIPPED | OUTPATIENT
Start: 2025-03-12 | End: 2025-03-13 | Stop reason: SDUPTHER

## 2025-03-13 ENCOUNTER — TELEPHONE (OUTPATIENT)
Dept: ENDOCRINOLOGY | Age: 75
End: 2025-03-13

## 2025-03-13 DIAGNOSIS — E11.628 TYPE 2 DIABETES MELLITUS WITH OTHER SKIN COMPLICATION, WITH LONG-TERM CURRENT USE OF INSULIN (HCC): ICD-10-CM

## 2025-03-13 DIAGNOSIS — E78.2 MIXED HYPERLIPIDEMIA: ICD-10-CM

## 2025-03-13 DIAGNOSIS — I10 BENIGN ESSENTIAL HTN: ICD-10-CM

## 2025-03-13 DIAGNOSIS — Z79.4 TYPE 2 DIABETES MELLITUS WITH OTHER SKIN COMPLICATION, WITH LONG-TERM CURRENT USE OF INSULIN (HCC): ICD-10-CM

## 2025-03-13 RX ORDER — ACYCLOVIR 400 MG/1
TABLET ORAL
Qty: 9 EACH | Refills: 3 | Status: SHIPPED | OUTPATIENT
Start: 2025-03-13

## 2025-03-13 RX ORDER — SEMAGLUTIDE 2.68 MG/ML
2 INJECTION, SOLUTION SUBCUTANEOUS
Qty: 9 ML | Refills: 1 | Status: SHIPPED | OUTPATIENT
Start: 2025-03-13

## 2025-03-13 NOTE — TELEPHONE ENCOUNTER
Called [facility] and spoke with pharmacist Nicky. Pharmacist confirmed script was received and no verbal needed.

## 2025-03-13 NOTE — TELEPHONE ENCOUNTER
Call from [facility] stating that they believe that are having issues with their system because they are not getting the pt's Rx for Ozempic 2 mg.     They are wanting to know if the nurse can call in with a verbal for the script     Please advise   CB# [phone]

## 2025-03-13 NOTE — TELEPHONE ENCOUNTER
Medication:   Requested Prescriptions     Pending Prescriptions Disp Refills    Continuous Glucose Sensor (DEXCOM G7 SENSOR) MISC 9 each 3     Sig: USE AS DIRECTED AND CHANGE EVERY 10 DAYS    semaglutide, 2 MG/DOSE, (OZEMPIC, 2 MG/DOSE,) 8 MG/3ML SOPN sc injection 9 mL 1     Sig: Inject 2 mg into the skin every 7 days       Last Filled:      Patient Phone Number: [phone] (home)     Last appt: 8/19/24   Next appt: 5/5/25    Last Labs DM:   Lab Results   Component Value Date/Time    LABA1C 7.2 08/16/2024 03:11 PM

## 2025-03-18 ENCOUNTER — TELEPHONE (OUTPATIENT)
Dept: ADMINISTRATIVE | Age: 75
End: 2025-03-18

## 2025-03-18 NOTE — TELEPHONE ENCOUNTER
Submitted PA for Dexcom G7 Sensor  Via [id number]  STATUS: PENDING.    Follow up done daily; if no decision with in three days we will refax.  If another three days goes by with no decision will call the insurance for status.

## 2025-03-18 NOTE — TELEPHONE ENCOUNTER
The medication is APPROVED.    Outcome  Approved today by [employer] 2017  The request has been approved. The authorization is effective from 03/18/2025 to 03/17/2026, as long as the member is enrolled in their current health plan. The request was approved as submitted. This request has been approved with the quantity limit of 3 sensors per 30 days.A second prior authorization [id number] has been entered for DEXCOM G7  and is effective from 03/18/2025 until 03/17/2026 with the quantity limit of 1  per 12 months.Please note: MAX 1 FILL FOR MAINT MEDS. MUST USE [facility]. FOR QUESTIONS OR EMERGENCIES, CALL [facility] [phone]. TO TRANSFER RX, CALL [facility] [phone] OPT 0. A written notification letter will follow with additional details.  Effective Date: 3/18/2025  Authorization Expiration Date: 3/17/2026    If this requires a response please respond to the pool ( P MHCX PSC MEDICATION PRE-AUTH).      Thank you please advise patient.

## 2025-04-08 DIAGNOSIS — E11.9 DIABETES MELLITUS WITHOUT COMPLICATION: ICD-10-CM

## 2025-04-08 DIAGNOSIS — E11.65 POORLY CONTROLLED TYPE 2 DIABETES MELLITUS WITH COMPLICATION (HCC): ICD-10-CM

## 2025-04-08 DIAGNOSIS — E11.8 POORLY CONTROLLED TYPE 2 DIABETES MELLITUS WITH COMPLICATION (HCC): ICD-10-CM

## 2025-04-08 DIAGNOSIS — E78.2 MIXED HYPERLIPIDEMIA: ICD-10-CM

## 2025-04-08 DIAGNOSIS — I10 BENIGN ESSENTIAL HTN: ICD-10-CM

## 2025-04-08 RX ORDER — OMEPRAZOLE 40 MG/1
40 CAPSULE, DELAYED RELEASE ORAL DAILY
Qty: 90 CAPSULE | Refills: 0 | Status: SHIPPED | OUTPATIENT
Start: 2025-04-08

## 2025-04-08 RX ORDER — ATORVASTATIN CALCIUM 20 MG/1
20 TABLET, FILM COATED ORAL DAILY
Qty: 90 TABLET | Refills: 0 | Status: SHIPPED | OUTPATIENT
Start: 2025-04-08

## 2025-04-08 RX ORDER — EMPAGLIFLOZIN, METFORMIN HYDROCHLORIDE 25; 1000 MG/1; MG/1
1 TABLET, EXTENDED RELEASE ORAL DAILY
Qty: 90 TABLET | Refills: 0 | Status: SHIPPED | OUTPATIENT
Start: 2025-04-08

## 2025-04-08 RX ORDER — SYRINGE-NEEDLE,INSULIN,0.5 ML 27GX1/2"
SYRINGE, EMPTY DISPOSABLE MISCELLANEOUS
Qty: 100 EACH | Refills: 0 | Status: SHIPPED | OUTPATIENT
Start: 2025-04-08

## 2025-04-08 RX ORDER — LOSARTAN POTASSIUM 100 MG/1
100 TABLET ORAL DAILY
Qty: 90 TABLET | Refills: 0 | Status: SHIPPED | OUTPATIENT
Start: 2025-04-08

## 2025-04-29 DIAGNOSIS — E11.628 TYPE 2 DIABETES MELLITUS WITH OTHER SKIN COMPLICATION, WITH LONG-TERM CURRENT USE OF INSULIN (HCC): ICD-10-CM

## 2025-04-29 DIAGNOSIS — I10 BENIGN ESSENTIAL HTN: ICD-10-CM

## 2025-04-29 DIAGNOSIS — E78.2 MIXED HYPERLIPIDEMIA: ICD-10-CM

## 2025-04-29 DIAGNOSIS — Z79.4 TYPE 2 DIABETES MELLITUS WITH OTHER SKIN COMPLICATION, WITH LONG-TERM CURRENT USE OF INSULIN (HCC): ICD-10-CM

## 2025-04-29 LAB
ALBUMIN SERPL-MCNC: 3.9 G/DL (ref 3.4–5)
ALBUMIN/GLOB SERPL: 2.2 {RATIO} (ref 1.1–2.2)
ALP SERPL-CCNC: 77 U/L (ref 40–129)
ALT SERPL-CCNC: 21 U/L (ref 10–40)
ANION GAP SERPL CALCULATED.3IONS-SCNC: 9 MMOL/L (ref 3–16)
AST SERPL-CCNC: 20 U/L (ref 15–37)
BILIRUB SERPL-MCNC: 0.3 MG/DL (ref 0–1)
BUN SERPL-MCNC: 15 MG/DL (ref 7–20)
CALCIUM SERPL-MCNC: 8.9 MG/DL (ref 8.3–10.6)
CHLORIDE SERPL-SCNC: 103 MMOL/L (ref 99–110)
CHOLEST SERPL-MCNC: 140 MG/DL (ref 0–199)
CO2 SERPL-SCNC: 27 MMOL/L (ref 21–32)
CREAT SERPL-MCNC: 1.1 MG/DL (ref 0.8–1.3)
CREAT UR-MCNC: 51.1 MG/DL (ref 39–259)
EST. AVERAGE GLUCOSE BLD GHB EST-MCNC: 188.6 MG/DL
GFR SERPLBLD CREATININE-BSD FMLA CKD-EPI: 70 ML/MIN/{1.73_M2}
GLUCOSE SERPL-MCNC: 240 MG/DL (ref 70–99)
HBA1C MFR BLD: 8.2 %
HDLC SERPL-MCNC: 41 MG/DL (ref 40–60)
LDLC SERPL CALC-MCNC: 74 MG/DL
MICROALBUMIN UR DL<=1MG/L-MCNC: 3.45 MG/DL
MICROALBUMIN/CREAT UR: 67.5 MG/G (ref 0–30)
POTASSIUM SERPL-SCNC: 4.3 MMOL/L (ref 3.5–5.1)
PROT SERPL-MCNC: 5.7 G/DL (ref 6.4–8.2)
SODIUM SERPL-SCNC: 139 MMOL/L (ref 136–145)
TRIGL SERPL-MCNC: 124 MG/DL (ref 0–150)
TSH SERPL DL<=0.005 MIU/L-ACNC: 1.75 UIU/ML (ref 0.27–4.2)
VLDLC SERPL CALC-MCNC: 25 MG/DL

## 2025-05-05 ENCOUNTER — OFFICE VISIT (OUTPATIENT)
Dept: ENDOCRINOLOGY | Age: 75
End: 2025-05-05
Payer: MEDICARE

## 2025-05-05 ENCOUNTER — TELEPHONE (OUTPATIENT)
Dept: ENDOCRINOLOGY | Age: 75
End: 2025-05-05

## 2025-05-05 VITALS
HEIGHT: 65 IN | HEART RATE: 87 BPM | WEIGHT: 172 LBS | DIASTOLIC BLOOD PRESSURE: 86 MMHG | SYSTOLIC BLOOD PRESSURE: 165 MMHG | BODY MASS INDEX: 28.66 KG/M2

## 2025-05-05 DIAGNOSIS — E11.8 POORLY CONTROLLED TYPE 2 DIABETES MELLITUS WITH COMPLICATION (HCC): ICD-10-CM

## 2025-05-05 DIAGNOSIS — Z79.4 TYPE 2 DIABETES MELLITUS WITH OTHER SKIN COMPLICATION, WITH LONG-TERM CURRENT USE OF INSULIN (HCC): Primary | ICD-10-CM

## 2025-05-05 DIAGNOSIS — E78.2 MIXED HYPERLIPIDEMIA: ICD-10-CM

## 2025-05-05 DIAGNOSIS — E11.9 DIABETES MELLITUS WITHOUT COMPLICATION (HCC): ICD-10-CM

## 2025-05-05 DIAGNOSIS — E11.628 TYPE 2 DIABETES MELLITUS WITH OTHER SKIN COMPLICATION, WITH LONG-TERM CURRENT USE OF INSULIN (HCC): Primary | ICD-10-CM

## 2025-05-05 DIAGNOSIS — I10 BENIGN ESSENTIAL HTN: ICD-10-CM

## 2025-05-05 DIAGNOSIS — E11.65 POORLY CONTROLLED TYPE 2 DIABETES MELLITUS WITH COMPLICATION (HCC): ICD-10-CM

## 2025-05-05 PROCEDURE — 2022F DILAT RTA XM EVC RTNOPTHY: CPT | Performed by: INTERNAL MEDICINE

## 2025-05-05 PROCEDURE — 3052F HG A1C>EQUAL 8.0%<EQUAL 9.0%: CPT | Performed by: INTERNAL MEDICINE

## 2025-05-05 PROCEDURE — 1036F TOBACCO NON-USER: CPT | Performed by: INTERNAL MEDICINE

## 2025-05-05 PROCEDURE — 95251 CONT GLUC MNTR ANALYSIS I&R: CPT | Performed by: INTERNAL MEDICINE

## 2025-05-05 PROCEDURE — 3077F SYST BP >= 140 MM HG: CPT | Performed by: INTERNAL MEDICINE

## 2025-05-05 PROCEDURE — G8427 DOCREV CUR MEDS BY ELIG CLIN: HCPCS | Performed by: INTERNAL MEDICINE

## 2025-05-05 PROCEDURE — 1123F ACP DISCUSS/DSCN MKR DOCD: CPT | Performed by: INTERNAL MEDICINE

## 2025-05-05 PROCEDURE — G8417 CALC BMI ABV UP PARAM F/U: HCPCS | Performed by: INTERNAL MEDICINE

## 2025-05-05 PROCEDURE — 1160F RVW MEDS BY RX/DR IN RCRD: CPT | Performed by: INTERNAL MEDICINE

## 2025-05-05 PROCEDURE — 3017F COLORECTAL CA SCREEN DOC REV: CPT | Performed by: INTERNAL MEDICINE

## 2025-05-05 PROCEDURE — 99214 OFFICE O/P EST MOD 30 MIN: CPT | Performed by: INTERNAL MEDICINE

## 2025-05-05 PROCEDURE — 3079F DIAST BP 80-89 MM HG: CPT | Performed by: INTERNAL MEDICINE

## 2025-05-05 PROCEDURE — 1159F MED LIST DOCD IN RCRD: CPT | Performed by: INTERNAL MEDICINE

## 2025-05-05 RX ORDER — ACYCLOVIR 400 MG/1
TABLET ORAL
Qty: 9 EACH | Refills: 3 | Status: SHIPPED | OUTPATIENT
Start: 2025-05-05

## 2025-05-05 RX ORDER — INSULIN LISPRO 100 [IU]/ML
INJECTION, SOLUTION INTRAVENOUS; SUBCUTANEOUS
Qty: 30 ML | Refills: 3 | Status: SHIPPED | OUTPATIENT
Start: 2025-05-05

## 2025-05-05 RX ORDER — ATORVASTATIN CALCIUM 20 MG/1
20 TABLET, FILM COATED ORAL DAILY
Qty: 90 TABLET | Refills: 1 | Status: SHIPPED | OUTPATIENT
Start: 2025-05-05

## 2025-05-05 RX ORDER — SEMAGLUTIDE 2.68 MG/ML
2 INJECTION, SOLUTION SUBCUTANEOUS
Qty: 9 ML | Refills: 1 | Status: SHIPPED | OUTPATIENT
Start: 2025-05-05

## 2025-05-05 RX ORDER — PEN NEEDLE, DIABETIC 31 GX5/16"
NEEDLE, DISPOSABLE MISCELLANEOUS
Qty: 400 EACH | Refills: 1 | Status: SHIPPED | OUTPATIENT
Start: 2025-05-05

## 2025-05-05 RX ORDER — EMPAGLIFLOZIN, METFORMIN HYDROCHLORIDE 25; 1000 MG/1; MG/1
1 TABLET, EXTENDED RELEASE ORAL DAILY
Qty: 90 TABLET | Refills: 1 | Status: SHIPPED | OUTPATIENT
Start: 2025-05-05

## 2025-05-05 RX ORDER — INSULIN GLARGINE 100 [IU]/ML
INJECTION, SOLUTION SUBCUTANEOUS
Qty: 40 ML | Refills: 3 | Status: SHIPPED | OUTPATIENT
Start: 2025-05-05

## 2025-05-05 RX ORDER — LOSARTAN POTASSIUM 100 MG/1
100 TABLET ORAL DAILY
Qty: 90 TABLET | Refills: 1 | Status: SHIPPED | OUTPATIENT
Start: 2025-05-05

## 2025-05-05 RX ORDER — OMEPRAZOLE 40 MG/1
40 CAPSULE, DELAYED RELEASE ORAL DAILY
Qty: 90 CAPSULE | Refills: 1 | Status: SHIPPED | OUTPATIENT
Start: 2025-05-05

## 2025-05-05 RX ORDER — SYRINGE-NEEDLE,INSULIN,0.5 ML 27GX1/2"
SYRINGE, EMPTY DISPOSABLE MISCELLANEOUS
Qty: 100 EACH | Refills: 1 | Status: SHIPPED | OUTPATIENT
Start: 2025-05-05

## 2025-05-05 NOTE — TELEPHONE ENCOUNTER
Submitted PA for Dexcom Sensor  Via [facility] Key: [id number] STATUS: PENDING.    Follow up done daily; if no decision with in three days we will refax.  If another three days goes by with no decision will call the insurance for status.

## 2025-05-05 NOTE — TELEPHONE ENCOUNTER
Submitted PA for Ozempic  Via [facility] Key: [id number] STATUS: PENDING.    Follow up done daily; if no decision with in three days we will refax.  If another three days goes by with no decision will call the insurance for status.

## 2025-05-05 NOTE — PROGRESS NOTES
-----------------------------  Dexcom Clarity  -----------------------------  Shai Den    YOB: 1950    Generated at: Mon, May 5, 2025 1:28 PM EDT    Reporting period: Tue Apr 22, 2025 - Mon May 5, 2025  -----------------------------  Glucose Details    Average glucose: 216 mg/dL    GMI: 8.5%    Standard deviation: 35 mg/dL    Coefficient of Variation: 16.4%  -----------------------------  Time in Range    Very High: 17%    High: 68%    In Range: 15%    Low: 0%    Very Low: 0%    Target Range   mg/dL    -----------------------------  Sensor usage    Days with data: 14/14    Time active: 97%    Avg. calibrations per day: 0.1

## 2025-05-06 NOTE — TELEPHONE ENCOUNTER
Approved on May 5 by [facility] 2017  PA Case: [id number], Status: Approved, Coverage Starts on: 1/1/2025 12:00:00 AM, Coverage Ends on: 12/31/2025 12:00:00 AM. Questions? Contact [phone].  Effective Date: 1/1/2025  Authorization Expiration Date: 12/31/2025    If this requires a response please respond to the pool ( P MHCX PSC MEDICATION PRE-AUTH).      Thank you please advise patient.

## 2025-05-06 NOTE — TELEPHONE ENCOUNTER
PA Case: [id number], Status: Approved, Coverage Starts on: 1/1/2025 12:00:00 AM, Coverage Ends on: 12/31/2025 12:00:00 AM. Questions? Contact [phone].. Authorization Expiration Date: December 31, 2025     If this requires a response please respond to the pool ( P MHCX PSC MEDICATION PRE-AUTH).      Thank you please advise patient.

## 2025-05-06 NOTE — TELEPHONE ENCOUNTER
Pt called and notified of dexcom g7 approval. Pt stated pharmacy updated sensors to dexcom g7 previously was freestyle danyell 2 sensors.

## 2025-05-07 ENCOUNTER — TELEPHONE (OUTPATIENT)
Dept: ENDOCRINOLOGY | Age: 75
End: 2025-05-07

## 2025-05-08 NOTE — TELEPHONE ENCOUNTER
Submitted PA for Synjardy  Via [facility] Key: [id number] STATUS: PENDING.    Follow up done daily; if no decision with in three days we will refax.  If another three days goes by with no decision will call the insurance for status.

## 2025-06-13 ENCOUNTER — CLINICAL DOCUMENTATION (OUTPATIENT)
Dept: PHARMACY | Facility: CLINIC | Age: 75
End: 2025-06-13

## 2025-06-13 NOTE — PROGRESS NOTES
Pharmacy Pop Care Documentation:     Shai Crenshaw is being removed from the diabetes management program for the following reason(s): Patients insurance ended 5/31/25    Shannon Shetty    For Pharmacy Admin Tracking Only    Program: Pop Health  CPA in place:  No  Gap Closed?: Yes   Time Spent (min): 5

## 2025-08-07 DIAGNOSIS — E11.65 POORLY CONTROLLED TYPE 2 DIABETES MELLITUS WITH COMPLICATION (HCC): ICD-10-CM

## 2025-08-07 DIAGNOSIS — E11.9 DIABETES MELLITUS WITHOUT COMPLICATION (HCC): ICD-10-CM

## 2025-08-07 DIAGNOSIS — E11.8 POORLY CONTROLLED TYPE 2 DIABETES MELLITUS WITH COMPLICATION (HCC): ICD-10-CM

## 2025-08-07 DIAGNOSIS — I10 BENIGN ESSENTIAL HTN: ICD-10-CM

## 2025-08-07 DIAGNOSIS — Z79.4 TYPE 2 DIABETES MELLITUS WITH OTHER SKIN COMPLICATION, WITH LONG-TERM CURRENT USE OF INSULIN (HCC): ICD-10-CM

## 2025-08-07 DIAGNOSIS — E11.628 TYPE 2 DIABETES MELLITUS WITH OTHER SKIN COMPLICATION, WITH LONG-TERM CURRENT USE OF INSULIN (HCC): ICD-10-CM

## 2025-08-07 DIAGNOSIS — E78.2 MIXED HYPERLIPIDEMIA: ICD-10-CM

## 2025-08-07 LAB
ALBUMIN SERPL-MCNC: 4.1 G/DL (ref 3.4–5)
ALBUMIN/GLOB SERPL: 2.3 {RATIO} (ref 1.1–2.2)
ALP SERPL-CCNC: 83 U/L (ref 40–129)
ALT SERPL-CCNC: 22 U/L (ref 10–40)
ANION GAP SERPL CALCULATED.3IONS-SCNC: 8 MMOL/L (ref 3–16)
AST SERPL-CCNC: 18 U/L (ref 15–37)
BILIRUB SERPL-MCNC: 0.3 MG/DL (ref 0–1)
BUN SERPL-MCNC: 16 MG/DL (ref 7–20)
CALCIUM SERPL-MCNC: 9.5 MG/DL (ref 8.3–10.6)
CHLORIDE SERPL-SCNC: 104 MMOL/L (ref 99–110)
CHOLEST SERPL-MCNC: 137 MG/DL (ref 0–199)
CO2 SERPL-SCNC: 28 MMOL/L (ref 21–32)
CREAT SERPL-MCNC: 1 MG/DL (ref 0.8–1.3)
CREAT UR-MCNC: 60.5 MG/DL (ref 39–259)
EST. AVERAGE GLUCOSE BLD GHB EST-MCNC: 182.9 MG/DL
GFR SERPLBLD CREATININE-BSD FMLA CKD-EPI: 78 ML/MIN/{1.73_M2}
GLUCOSE SERPL-MCNC: 269 MG/DL (ref 70–99)
HBA1C MFR BLD: 8 %
HDLC SERPL-MCNC: 43 MG/DL (ref 40–60)
LDLC SERPL CALC-MCNC: 74 MG/DL
MICROALBUMIN UR DL<=1MG/L-MCNC: 3.59 MG/DL
MICROALBUMIN/CREAT UR: 59.3 MG/G (ref 0–30)
POTASSIUM SERPL-SCNC: 4.9 MMOL/L (ref 3.5–5.1)
PROT SERPL-MCNC: 5.9 G/DL (ref 6.4–8.2)
SODIUM SERPL-SCNC: 140 MMOL/L (ref 136–145)
TRIGL SERPL-MCNC: 100 MG/DL (ref 0–150)
TSH SERPL DL<=0.005 MIU/L-ACNC: 2.27 UIU/ML (ref 0.27–4.2)
VLDLC SERPL CALC-MCNC: 20 MG/DL

## 2025-08-11 ENCOUNTER — OFFICE VISIT (OUTPATIENT)
Dept: ENDOCRINOLOGY | Age: 75
End: 2025-08-11
Payer: MEDICARE

## 2025-08-11 VITALS
SYSTOLIC BLOOD PRESSURE: 139 MMHG | HEIGHT: 65 IN | OXYGEN SATURATION: 98 % | DIASTOLIC BLOOD PRESSURE: 62 MMHG | BODY MASS INDEX: 27.96 KG/M2 | WEIGHT: 167.8 LBS | HEART RATE: 72 BPM | TEMPERATURE: 98 F | RESPIRATION RATE: 16 BRPM

## 2025-08-11 DIAGNOSIS — E11.628 TYPE 2 DIABETES MELLITUS WITH OTHER SKIN COMPLICATION, WITH LONG-TERM CURRENT USE OF INSULIN (HCC): Primary | ICD-10-CM

## 2025-08-11 DIAGNOSIS — E78.2 MIXED HYPERLIPIDEMIA: ICD-10-CM

## 2025-08-11 DIAGNOSIS — Z79.4 TYPE 2 DIABETES MELLITUS WITH OTHER SKIN COMPLICATION, WITH LONG-TERM CURRENT USE OF INSULIN (HCC): Primary | ICD-10-CM

## 2025-08-11 DIAGNOSIS — I10 BENIGN ESSENTIAL HTN: ICD-10-CM

## 2025-08-11 PROCEDURE — 3078F DIAST BP <80 MM HG: CPT | Performed by: INTERNAL MEDICINE

## 2025-08-11 PROCEDURE — 3052F HG A1C>EQUAL 8.0%<EQUAL 9.0%: CPT | Performed by: INTERNAL MEDICINE

## 2025-08-11 PROCEDURE — G8417 CALC BMI ABV UP PARAM F/U: HCPCS | Performed by: INTERNAL MEDICINE

## 2025-08-11 PROCEDURE — G8427 DOCREV CUR MEDS BY ELIG CLIN: HCPCS | Performed by: INTERNAL MEDICINE

## 2025-08-11 PROCEDURE — 95251 CONT GLUC MNTR ANALYSIS I&R: CPT | Performed by: INTERNAL MEDICINE

## 2025-08-11 PROCEDURE — 99214 OFFICE O/P EST MOD 30 MIN: CPT | Performed by: INTERNAL MEDICINE

## 2025-08-11 PROCEDURE — 1160F RVW MEDS BY RX/DR IN RCRD: CPT | Performed by: INTERNAL MEDICINE

## 2025-08-11 PROCEDURE — 1036F TOBACCO NON-USER: CPT | Performed by: INTERNAL MEDICINE

## 2025-08-11 PROCEDURE — 3075F SYST BP GE 130 - 139MM HG: CPT | Performed by: INTERNAL MEDICINE

## 2025-08-11 PROCEDURE — 1123F ACP DISCUSS/DSCN MKR DOCD: CPT | Performed by: INTERNAL MEDICINE

## 2025-08-11 PROCEDURE — 1159F MED LIST DOCD IN RCRD: CPT | Performed by: INTERNAL MEDICINE

## 2025-08-14 DIAGNOSIS — Z79.4 TYPE 2 DIABETES MELLITUS WITH OTHER SKIN COMPLICATION, WITH LONG-TERM CURRENT USE OF INSULIN (HCC): ICD-10-CM

## 2025-08-14 DIAGNOSIS — I10 BENIGN ESSENTIAL HTN: ICD-10-CM

## 2025-08-14 DIAGNOSIS — E11.628 TYPE 2 DIABETES MELLITUS WITH OTHER SKIN COMPLICATION, WITH LONG-TERM CURRENT USE OF INSULIN (HCC): ICD-10-CM

## 2025-08-14 DIAGNOSIS — E78.2 MIXED HYPERLIPIDEMIA: ICD-10-CM

## 2025-08-14 RX ORDER — SEMAGLUTIDE 2.68 MG/ML
INJECTION, SOLUTION SUBCUTANEOUS
Qty: 9 ML | Refills: 1 | Status: SHIPPED | OUTPATIENT
Start: 2025-08-14